# Patient Record
Sex: MALE | Race: WHITE | NOT HISPANIC OR LATINO | Employment: FULL TIME | URBAN - METROPOLITAN AREA
[De-identification: names, ages, dates, MRNs, and addresses within clinical notes are randomized per-mention and may not be internally consistent; named-entity substitution may affect disease eponyms.]

---

## 2018-01-26 ENCOUNTER — TRANSFERRED RECORDS (OUTPATIENT)
Dept: MULTI SPECIALTY CLINIC | Facility: CLINIC | Age: 53
End: 2018-01-26

## 2018-05-04 LAB
CHOLEST SERPL-MCNC: 84 MG/DL
HDLC SERPL-MCNC: 26 MG/DL
LDLC SERPL CALC-MCNC: 24 MG/DL
TRIGL SERPL-MCNC: 172 MG/DL
TSH SERPL-ACNC: 0.67 UIU/ML (ref 0.36–3.74)

## 2019-08-17 LAB
CHOLEST SERPL-MCNC: 110 MG/DL
HDLC SERPL-MCNC: 32 MG/DL
LDLC SERPL CALC-MCNC: 54 MG/DL
TRIGL SERPL-MCNC: 121 MG/DL

## 2020-04-27 ENCOUNTER — TRANSFERRED RECORDS (OUTPATIENT)
Dept: HEALTH INFORMATION MANAGEMENT | Facility: CLINIC | Age: 55
End: 2020-04-27

## 2020-08-11 ENCOUNTER — VIRTUAL VISIT (OUTPATIENT)
Dept: ENDOCRINOLOGY | Facility: CLINIC | Age: 55
End: 2020-08-11
Payer: COMMERCIAL

## 2020-08-11 ENCOUNTER — APPOINTMENT (OUTPATIENT)
Dept: LAB | Facility: CLINIC | Age: 55
End: 2020-08-11
Payer: COMMERCIAL

## 2020-08-11 DIAGNOSIS — E78.5 DYSLIPIDEMIA: ICD-10-CM

## 2020-08-11 DIAGNOSIS — Z79.4 TYPE 2 DIABETES MELLITUS WITH HYPERGLYCEMIA, WITH LONG-TERM CURRENT USE OF INSULIN (H): Primary | ICD-10-CM

## 2020-08-11 DIAGNOSIS — E11.65 TYPE 2 DIABETES MELLITUS WITH HYPERGLYCEMIA, WITH LONG-TERM CURRENT USE OF INSULIN (H): Primary | ICD-10-CM

## 2020-08-11 DIAGNOSIS — I10 ESSENTIAL HYPERTENSION: ICD-10-CM

## 2020-08-11 LAB — HBA1C MFR BLD: 9.3 % (ref 0–5.6)

## 2020-08-11 PROCEDURE — 82947 ASSAY GLUCOSE BLOOD QUANT: CPT | Performed by: INTERNAL MEDICINE

## 2020-08-11 PROCEDURE — 99203 OFFICE O/P NEW LOW 30 MIN: CPT | Mod: 95 | Performed by: INTERNAL MEDICINE

## 2020-08-11 PROCEDURE — 99000 SPECIMEN HANDLING OFFICE-LAB: CPT | Performed by: INTERNAL MEDICINE

## 2020-08-11 PROCEDURE — 36415 COLL VENOUS BLD VENIPUNCTURE: CPT | Performed by: INTERNAL MEDICINE

## 2020-08-11 PROCEDURE — 82607 VITAMIN B-12: CPT | Performed by: INTERNAL MEDICINE

## 2020-08-11 PROCEDURE — 84681 ASSAY OF C-PEPTIDE: CPT | Performed by: INTERNAL MEDICINE

## 2020-08-11 PROCEDURE — 83036 HEMOGLOBIN GLYCOSYLATED A1C: CPT | Performed by: INTERNAL MEDICINE

## 2020-08-11 PROCEDURE — 86341 ISLET CELL ANTIBODY: CPT | Mod: 90 | Performed by: INTERNAL MEDICINE

## 2020-08-11 RX ORDER — METFORMIN HYDROCHLORIDE 500 MG/5ML
SOLUTION ORAL
COMMUNITY
End: 2021-09-30

## 2020-08-11 RX ORDER — INSULIN GLARGINE 100 [IU]/ML
INJECTION, SOLUTION SUBCUTANEOUS DAILY
COMMUNITY
End: 2021-07-13

## 2020-08-11 NOTE — NURSING NOTE
BG Log (Am 7/28/2020-8/11/2020) Patient has [employer] acct., but was unable to get connected. He will call customer service to get acct reconnected.     148 7/28/2020  172  168  244  191   268 out of town and forgot medication  255 out of town and forgot medication  217 out of town and forgot medication  201 out of town and forgot medication  181  171  147  203  212  266 8/11/2020

## 2020-08-11 NOTE — NURSING NOTE
Chief Complaint   Patient presents with     New Patient     DM       Initial There were no vitals taken for this visit. There is no height or weight on file to calculate BMI.  BP completed using cuff size: NA (Not Taken)  Medications and allergies reviewed.      Gissell ROA MA

## 2020-08-11 NOTE — PROGRESS NOTES
"Miquel Llanes is a 55 year old male who is being evaluated via a billable video visit.      The patient has been notified of following:     \"This video visit will be conducted via a call between you and your physician/provider. We have found that certain health care needs can be provided without the need for an in-person physical exam.  This service lets us provide the care you need with a video conversation.  If a prescription is necessary we can send it directly to your pharmacy.  If lab work is needed we can place an order for that and you can then stop by our lab to have the test done at a later time.    Video visits are billed at different rates depending on your insurance coverage.  Please reach out to your insurance provider with any questions.    If during the course of the call the physician/provider feels a video visit is not appropriate, you will not be charged for this service.\"    Patient has given verbal consent for Video visit? Yes  How would you like to obtain your AVS? Calixarhart  If you are dropped from the video visit, the video invite should be resent to: Other e-mail: [employer]  Will anyone else be joining your video visit? No        Video-Visit Details    Type of service:  Video Visit    Video Start Time: 1:31 PM  Video End Time: 1400    Originating Location (pt. Location): Home    Distant Location (provider location):  [facility]     Platform used for Video Visit: Gavin    CC: DM     HPI: Patient presents for management of DM.   Estimates he was diagnosed 10 years ago on routine labs.     He has been seeing Dr Ibarra.     Logs: (AM readings)  148 7/28/2020  172  168  244  191   268 out of town and forgot medication  255 out of town and forgot medication  217 out of town and forgot medication  201 out of town and forgot medication  181  171  147  203  212  266 8/11/2020    He is taking metformin 1000 mg every day, does not tolerate a higher dose.   Jardiance 25 mg every day. "   Basaglar 45 U every day     Comments when he took 50 Units, his feet would burn. No hypoglycemia.   Reduced pop intake and portion sizes.   No regular exercise.   He is wondering if he is still making insulin.     ROS: 10 point ROS neg other than the symptoms noted above in the HPI.    PMH:   Type 2 DM  DJD  Dyslipidemia  Obesity     Meds:  Current Outpatient Medications   Medication     empagliflozin (JARDIANCE) 25 MG TABS tablet     insulin glargine (BASAGLAR KWIKPEN) 100 UNIT/ML pen     metFORMIN (GLUCOPHAGE) 500 MG/5ML SOLN solution     No current facility-administered medications for this visit.      FHX:   Knows DM is in his family but not sure who has it. Does note have a good relationship with his family.     SHX:  Non-smoker.     Exam:   GENERAL: Healthy, alert and no distress  EYES: Eyes grossly normal to inspection.  No discharge or erythema, or obvious scleral/conjunctival abnormalities.  NECK: No asymmetry, visible masses or scars  RESP: No audible wheeze, cough, or visible cyanosis.  No visible retractions or increased work of breathing.    MS: No gross musculoskeletal defects noted.  Normal range of motion.  No visible edema.  SKIN: Visible skin clear. No significant rash, abnormal pigmentation or lesions.  NEURO: Cranial nerves grossly intact.  Mentation and speech appropriate for age.  PSYCH: Mentation appears normal, affect normal/bright, judgement and insight intact, normal speech and appearance well-groomed.     A/P:   Type 2 DM - Outside records reviewed. Dose limited by neuropathy which is aggravated by large changes in glucose per history. He is wondering if he is still making insulin. Discussed reports about GLP-1 and DDP-4 inhibitor with pancreatitis and pancreatic cancer. Although I cannot definitively say the patient will not get pancreatitis or pancreatic cancer, to date there is noconclusive evidence of a causal link with these agents for pancreatic cancer. There is a slight increase  in risk of pancreatitis but overall risk still low. Persons with DM tend to have more pancreatitis and pancreatic cancer de derek. He liked bydureon in the past but it became expensive.   -Check c-peptide, KALE, and glucose.    -Add GLP-1 agonist if still making insulin.    -Add prandial insulin and drop metformin and jardiance if not making insulin.   -Recommend checking before bedtime and AM.   -Check B12 level.   -ASA use optional.  -NAFL/NAIR: labs due.   -Microalbumin normal in 3/2018. ACEi not indicated.    Repeat lab.  (Will contact Dr Ibarra's office for labs as patient says he was seen recently)  -Eyes: Urged to schedule an exam.   -Smoking: none.      Elevated blood pressure - elevated when last checked.   -Rx for blood pressure cuff.     Dyslipidemia - Trg 264, HDL 26, LDL 29 in 9/2017. He has not been on a statin.   -Repeat lab. (Will contact Dr Ibarra's office for labs as patient says he was seen recently)      Wiliam Butts MD on 8/11/2020 at 2:29 PM

## 2020-08-11 NOTE — Clinical Note
Please request records for this patient from Dr Ibarra.   Fax : [phone]    Wiliam Butts MD on 8/11/2020 at 1:55 PM

## 2020-08-11 NOTE — PROGRESS NOTES
[employer] form faxed to Dr. Ibarra [phone] requesting copy of patient's records.    Gissell ROA MA

## 2020-08-12 LAB
C PEPTIDE SERPL-MCNC: 4.3 NG/ML (ref 0.9–6.9)
GLUCOSE SERPL-MCNC: 199 MG/DL (ref 70–99)
VIT B12 SERPL-MCNC: 630 PG/ML (ref 193–986)

## 2020-08-14 LAB — GAD65 AB SER IA-ACNC: <5 IU/ML (ref 0–5)

## 2020-08-17 DIAGNOSIS — Z79.4 TYPE 2 DIABETES MELLITUS WITH HYPERGLYCEMIA, WITH LONG-TERM CURRENT USE OF INSULIN (H): Primary | ICD-10-CM

## 2020-08-17 DIAGNOSIS — E11.65 TYPE 2 DIABETES MELLITUS WITH HYPERGLYCEMIA, WITH LONG-TERM CURRENT USE OF INSULIN (H): Primary | ICD-10-CM

## 2020-11-19 ENCOUNTER — TRANSFERRED RECORDS (OUTPATIENT)
Dept: HEALTH INFORMATION MANAGEMENT | Facility: CLINIC | Age: 55
End: 2020-11-19

## 2020-11-19 LAB — RETINOPATHY: NEGATIVE

## 2021-01-10 ENCOUNTER — HEALTH MAINTENANCE LETTER (OUTPATIENT)
Age: 56
End: 2021-01-10

## 2021-03-22 ENCOUNTER — IMMUNIZATION (OUTPATIENT)
Dept: NURSING | Facility: CLINIC | Age: 56
End: 2021-03-22
Payer: COMMERCIAL

## 2021-03-22 PROCEDURE — 0001A PR COVID VAC PFIZER DIL RECON 30 MCG/0.3 ML IM: CPT

## 2021-03-22 PROCEDURE — 91300 PR COVID VAC PFIZER DIL RECON 30 MCG/0.3 ML IM: CPT

## 2021-03-29 DIAGNOSIS — R82.90 NONSPECIFIC FINDING ON EXAMINATION OF URINE: Primary | ICD-10-CM

## 2021-03-29 DIAGNOSIS — Z79.4 TYPE 2 DIABETES MELLITUS WITH HYPERGLYCEMIA, WITH LONG-TERM CURRENT USE OF INSULIN (H): ICD-10-CM

## 2021-03-29 DIAGNOSIS — I10 ESSENTIAL HYPERTENSION: ICD-10-CM

## 2021-03-29 DIAGNOSIS — E11.65 TYPE 2 DIABETES MELLITUS WITH HYPERGLYCEMIA, WITH LONG-TERM CURRENT USE OF INSULIN (H): ICD-10-CM

## 2021-03-29 LAB — HBA1C MFR BLD: 8.9 % (ref 0–5.6)

## 2021-03-29 PROCEDURE — 80048 BASIC METABOLIC PNL TOTAL CA: CPT | Performed by: INTERNAL MEDICINE

## 2021-03-29 PROCEDURE — 80061 LIPID PANEL: CPT | Performed by: INTERNAL MEDICINE

## 2021-03-29 PROCEDURE — 83036 HEMOGLOBIN GLYCOSYLATED A1C: CPT | Performed by: INTERNAL MEDICINE

## 2021-03-29 PROCEDURE — 83721 ASSAY OF BLOOD LIPOPROTEIN: CPT | Mod: 59 | Performed by: INTERNAL MEDICINE

## 2021-03-29 PROCEDURE — 36415 COLL VENOUS BLD VENIPUNCTURE: CPT | Performed by: INTERNAL MEDICINE

## 2021-03-29 PROCEDURE — 84460 ALANINE AMINO (ALT) (SGPT): CPT | Performed by: INTERNAL MEDICINE

## 2021-03-29 PROCEDURE — 84450 TRANSFERASE (AST) (SGOT): CPT | Performed by: INTERNAL MEDICINE

## 2021-03-29 PROCEDURE — 82043 UR ALBUMIN QUANTITATIVE: CPT | Performed by: INTERNAL MEDICINE

## 2021-03-30 LAB
ALT SERPL W P-5'-P-CCNC: 43 U/L (ref 0–70)
ANION GAP SERPL CALCULATED.3IONS-SCNC: 5 MMOL/L (ref 3–14)
AST SERPL W P-5'-P-CCNC: 25 U/L (ref 0–45)
BUN SERPL-MCNC: 17 MG/DL (ref 7–30)
CALCIUM SERPL-MCNC: 8.7 MG/DL (ref 8.5–10.1)
CHLORIDE SERPL-SCNC: 102 MMOL/L (ref 94–109)
CHOLEST SERPL-MCNC: 106 MG/DL
CO2 SERPL-SCNC: 26 MMOL/L (ref 20–32)
CREAT SERPL-MCNC: 0.71 MG/DL (ref 0.66–1.25)
CREAT UR-MCNC: 35 MG/DL
GFR SERPL CREATININE-BSD FRML MDRD: >90 ML/MIN/{1.73_M2}
GLUCOSE SERPL-MCNC: 370 MG/DL (ref 70–99)
HDLC SERPL-MCNC: 24 MG/DL
LDLC SERPL CALC-MCNC: ABNORMAL MG/DL
LDLC SERPL DIRECT ASSAY-MCNC: 56 MG/DL
MICROALBUMIN UR-MCNC: 24 MG/L
MICROALBUMIN/CREAT UR: 67.51 MG/G CR (ref 0–17)
NONHDLC SERPL-MCNC: 82 MG/DL
POTASSIUM SERPL-SCNC: 4.1 MMOL/L (ref 3.4–5.3)
SODIUM SERPL-SCNC: 133 MMOL/L (ref 133–144)
TRIGL SERPL-MCNC: 588 MG/DL

## 2021-04-12 ENCOUNTER — IMMUNIZATION (OUTPATIENT)
Dept: NURSING | Facility: CLINIC | Age: 56
End: 2021-04-12
Attending: FAMILY MEDICINE
Payer: COMMERCIAL

## 2021-04-12 PROCEDURE — 0002A PR COVID VAC PFIZER DIL RECON 30 MCG/0.3 ML IM: CPT

## 2021-04-12 PROCEDURE — 91300 PR COVID VAC PFIZER DIL RECON 30 MCG/0.3 ML IM: CPT

## 2021-04-14 ENCOUNTER — OFFICE VISIT (OUTPATIENT)
Dept: FAMILY MEDICINE | Facility: CLINIC | Age: 56
End: 2021-04-14
Payer: COMMERCIAL

## 2021-04-14 VITALS
WEIGHT: 239.2 LBS | HEIGHT: 69 IN | BODY MASS INDEX: 35.43 KG/M2 | RESPIRATION RATE: 20 BRPM | HEART RATE: 96 BPM | OXYGEN SATURATION: 99 % | SYSTOLIC BLOOD PRESSURE: 152 MMHG | TEMPERATURE: 98.1 F | DIASTOLIC BLOOD PRESSURE: 90 MMHG

## 2021-04-14 DIAGNOSIS — E11.29 TYPE 2 DIABETES MELLITUS WITH MICROALBUMINURIA, WITH LONG-TERM CURRENT USE OF INSULIN (H): ICD-10-CM

## 2021-04-14 DIAGNOSIS — Z12.11 SCREEN FOR COLON CANCER: ICD-10-CM

## 2021-04-14 DIAGNOSIS — G62.9 POLYNEUROPATHY: ICD-10-CM

## 2021-04-14 DIAGNOSIS — Z11.4 SCREENING FOR HIV (HUMAN IMMUNODEFICIENCY VIRUS): ICD-10-CM

## 2021-04-14 DIAGNOSIS — Z12.5 SCREENING PSA (PROSTATE SPECIFIC ANTIGEN): ICD-10-CM

## 2021-04-14 DIAGNOSIS — Z00.01 ENCOUNTER FOR ROUTINE ADULT MEDICAL EXAM WITH ABNORMAL FINDINGS: Primary | ICD-10-CM

## 2021-04-14 DIAGNOSIS — Z11.59 NEED FOR HEPATITIS C SCREENING TEST: ICD-10-CM

## 2021-04-14 DIAGNOSIS — Z13.220 LIPID SCREENING: ICD-10-CM

## 2021-04-14 DIAGNOSIS — R39.9 LOWER URINARY TRACT SYMPTOMS: ICD-10-CM

## 2021-04-14 DIAGNOSIS — Z79.4 TYPE 2 DIABETES MELLITUS WITH MICROALBUMINURIA, WITH LONG-TERM CURRENT USE OF INSULIN (H): ICD-10-CM

## 2021-04-14 DIAGNOSIS — R20.8 DYSESTHESIA: ICD-10-CM

## 2021-04-14 DIAGNOSIS — E78.1 HYPERTRIGLYCERIDEMIA: ICD-10-CM

## 2021-04-14 DIAGNOSIS — R80.9 TYPE 2 DIABETES MELLITUS WITH MICROALBUMINURIA, WITH LONG-TERM CURRENT USE OF INSULIN (H): ICD-10-CM

## 2021-04-14 LAB
B BURGDOR IGG+IGM SER QL: 0.08 (ref 0–0.89)
PSA SERPL-ACNC: 0.44 UG/L (ref 0–4)

## 2021-04-14 PROCEDURE — 36415 COLL VENOUS BLD VENIPUNCTURE: CPT | Performed by: PHYSICIAN ASSISTANT

## 2021-04-14 PROCEDURE — 99386 PREV VISIT NEW AGE 40-64: CPT | Performed by: PHYSICIAN ASSISTANT

## 2021-04-14 PROCEDURE — 86618 LYME DISEASE ANTIBODY: CPT | Performed by: PHYSICIAN ASSISTANT

## 2021-04-14 PROCEDURE — 86803 HEPATITIS C AB TEST: CPT | Performed by: PHYSICIAN ASSISTANT

## 2021-04-14 PROCEDURE — 99214 OFFICE O/P EST MOD 30 MIN: CPT | Mod: 25 | Performed by: PHYSICIAN ASSISTANT

## 2021-04-14 PROCEDURE — G0103 PSA SCREENING: HCPCS | Performed by: PHYSICIAN ASSISTANT

## 2021-04-14 PROCEDURE — 87389 HIV-1 AG W/HIV-1&-2 AB AG IA: CPT | Performed by: PHYSICIAN ASSISTANT

## 2021-04-14 ASSESSMENT — MIFFLIN-ST. JEOR: SCORE: 1915.63

## 2021-04-14 NOTE — PROGRESS NOTES
3  SUBJECTIVE:   CC: Miquel Llanes is an 56 year old male who presents for preventive health visit.     Patient has been advised of split billing requirements and indicates understanding: Yes  Healthy Habits:      Do you get at least three servings of calcium containing foods daily (dairy, green leafy vegetables, etc.)? yes and no, taking calcium and/or vitamin D supplement: no  Amount of exercise or daily activities, outside of work: 7 day(s) per week  Problems taking medications regularly No  Medication side effects: No  Have you had an eye exam in the past two years? Yes-[facility]  Do you see a dentist twice per year? yes  Do you have sleep apnea, excessive snoring or daytime drowsiness?yes-sometimes    The ASCVD Risk score (Babak ZIMMER Jr., et al., 2013) failed to calculate for the following reasons:    The valid total cholesterol range is 130 to 320 mg/dL     PROBLEMS TO ADD ON...  Establish Care  DM type 2    Fasting     When walking it feels like I am walking in mud  Intermittent  Feels like every step was heavy  No balance changes, double vision, one sided weakness  Has happened on longer walks, >15-20 mins of walking  Started 1 year ago, not more frequent     Burning sensations, altered sensation  Started 6-7 months, worsening now    Joined naturally slim - went sugar free for 3 weeks and his A1c and sugars changed for the worse     Frequent urination   Feels like it takes a long time to urinate - in the bathroom forever    -------------------------------------    Today's PHQ-2 Score:   PHQ-2 ( 1999 [employer]izer) 4/14/2021   Q1: Little interest or pleasure in doing things 0   Q2: Feeling down, depressed or hopeless 0   PHQ-2 Score 0       Abuse: Current or Past(Physical, Sexual or Emotional)- No  Do you feel safe in your environment? Yes    Have you ever done Advance Care Planning? (For example, a Health Directive, POLST, or a discussion with a medical provider or your loved ones about your  "wishes): No, advance care planning information given to patient to review.  Patient plans to discuss their wishes with loved ones or provider.      Social History     Tobacco Use     Smoking status: Never Smoker     Smokeless tobacco: Never Used   Substance Use Topics     Alcohol use: Not Currently     If you drink alcohol do you typically have >3 drinks per day or >7 drinks per week? No                      Last PSA: No results found for: PSA    Reviewed orders with patient. Reviewed health maintenance and updated orders accordingly - Yes  Lab work is in process    Reviewed and updated as needed this visit by clinical staff  Tobacco  Allergies               Reviewed and updated as needed this visit by Provider                Past Medical History:   Diagnosis Date     Diabetes (H)         ROS:  Other than what is noted in the HPI and PMH a complete review of systems is otherwise negative including: Constitutional, HEENT, endocrine, cardiovascular, respiratory, GI/, musculoskeletal, neuro, and psychiatric.     OBJECTIVE:   BP (!) 152/90   Pulse 96   Temp 98.1  F (36.7  C) (Tympanic)   Resp 20   Ht 1.761 m (5' 9.33\")   Wt 108.5 kg (239 lb 3.2 oz)   SpO2 99%   BMI 34.99 kg/m    EXAM:  GENERAL: healthy, alert and no distress  EYES: Eyes grossly normal to inspection, PERRL and conjunctivae and sclerae normal  HENT: ear canals and TM's normal, nose and mouth without ulcers or lesions  NECK: no adenopathy, no asymmetry, masses, or scars and thyroid normal to palpation  RESP: lungs clear to auscultation - no rales, rhonchi or wheezes  CV: regular rates and rhythm, normal S1 S2, no S3 or S4 and no murmur, click or rub  ABDOMEN: soft, nontender, no hepatosplenomegaly, no masses and bowel sounds normal  MS: no gross musculoskeletal defects noted, no edema  SKIN: no suspicious lesions or rashes  NEURO: Normal strength and tone, mentation intact and speech normal  PSYCH: mentation appears normal, affect " "normal/bright    ASSESSMENT/PLAN:       ICD-10-CM    1. Encounter for routine adult medical exam with abnormal findings  Z00.01    2. Screen for colon cancer  Z12.11    3. Screening for HIV (human immunodeficiency virus)  Z11.4 HIV Antigen Antibody Combo   4. Need for hepatitis C screening test  Z11.59 Hepatitis C Screen Reflex to HCV RNA Quant and Genotype   5. Lipid screening  Z13.220 Lipid panel reflex to direct LDL Fasting   6. Screening PSA (prostate specific antigen)  Z12.5 Prostate spec antigen screen   7. Type 2 diabetes mellitus with microalbuminuria, with long-term current use of insulin (H)  E11.29 **A1C FUTURE anytime    R80.9 CANCELED: Albumin Random Urine Quantitative with Creat Ratio    Z79.4    8. Hypertriglyceridemia  E78.1    9. Dysesthesia  R20.8 Lyme Disease Farhana with reflex to WB Serum   10. Polyneuropathy  G62.9    11. Lower urinary tract symptoms  R39.9 UROLOGY ADULT REFERRAL       1-6) Screenings discussed    7,8) Follows up with endocrine on 4/20/21. Will recheck A1c and lipids in 3 months.     9,10) Presumptive diabetic polyneuropathy. Will check a Lyme titer today. This could improve as his diabetes becomes controlled again. If not, could obtain an EMG or refer him to neurology.     11) Referral to urology for further eval/treatment.      Patient has been advised of split billing requirements and indicates understanding: Yes  COUNSELING:  Reviewed preventive health counseling, as reflected in patient instructions    Estimated body mass index is 34.99 kg/m  as calculated from the following:    Height as of this encounter: 1.761 m (5' 9.33\").    Weight as of this encounter: 108.5 kg (239 lb 3.2 oz).    He reports that he has never smoked. He has never used smokeless tobacco.      Counseling Resources:  ATP IV Guidelines  Pooled Cohorts Equation Calculator  FRAX Risk Assessment  ICSI Preventive Guidelines  Dietary Guidelines for Americans, 2010  USDA's MyPlate  ASA Prophylaxis  Lung CA " Screening    Ana Maria Valentin PA-C  [facility]

## 2021-04-15 LAB
HCV AB SERPL QL IA: NONREACTIVE
HIV 1+2 AB+HIV1 P24 AG SERPL QL IA: NONREACTIVE

## 2021-04-20 ENCOUNTER — OFFICE VISIT (OUTPATIENT)
Dept: ENDOCRINOLOGY | Facility: CLINIC | Age: 56
End: 2021-04-20
Payer: COMMERCIAL

## 2021-04-20 VITALS
BODY MASS INDEX: 36.26 KG/M2 | DIASTOLIC BLOOD PRESSURE: 84 MMHG | RESPIRATION RATE: 18 BRPM | WEIGHT: 244.8 LBS | SYSTOLIC BLOOD PRESSURE: 158 MMHG | HEIGHT: 69 IN | HEART RATE: 96 BPM

## 2021-04-20 DIAGNOSIS — Z79.4 TYPE 2 DIABETES MELLITUS WITH HYPERGLYCEMIA, WITH LONG-TERM CURRENT USE OF INSULIN (H): Primary | ICD-10-CM

## 2021-04-20 DIAGNOSIS — R80.9 MICROALBUMINURIA: ICD-10-CM

## 2021-04-20 DIAGNOSIS — E11.65 TYPE 2 DIABETES MELLITUS WITH HYPERGLYCEMIA, WITH LONG-TERM CURRENT USE OF INSULIN (H): Primary | ICD-10-CM

## 2021-04-20 DIAGNOSIS — E78.5 DYSLIPIDEMIA: ICD-10-CM

## 2021-04-20 DIAGNOSIS — I10 ESSENTIAL HYPERTENSION: ICD-10-CM

## 2021-04-20 PROCEDURE — 99214 OFFICE O/P EST MOD 30 MIN: CPT | Performed by: INTERNAL MEDICINE

## 2021-04-20 RX ORDER — ATORVASTATIN CALCIUM 40 MG/1
40 TABLET, FILM COATED ORAL DAILY
Qty: 90 TABLET | Refills: 3 | Status: SHIPPED | OUTPATIENT
Start: 2021-04-20 | End: 2022-04-06

## 2021-04-20 RX ORDER — LISINOPRIL 10 MG/1
10 TABLET ORAL DAILY
Qty: 90 TABLET | Refills: 3 | Status: SHIPPED | OUTPATIENT
Start: 2021-04-20 | End: 2022-04-06

## 2021-04-20 RX ORDER — PIOGLITAZONEHYDROCHLORIDE 15 MG/1
15 TABLET ORAL DAILY
Qty: 90 TABLET | Refills: 3 | Status: SHIPPED | OUTPATIENT
Start: 2021-04-20 | End: 2022-04-06

## 2021-04-20 ASSESSMENT — MIFFLIN-ST. JEOR: SCORE: 1941.02

## 2021-04-20 NOTE — PATIENT INSTRUCTIONS
-Start lisinopril 10 mg every day . For blood pressure.   -Lab and blood pressure check in 2 weeks.      -Start atorvastatin 40 mg every day.  For cholesterol.   -Lipids in 3 months.     -Diabetes education referral. Discuss your problems with bydureon injections with them.   -Start checking glucose before each meal and at bedtime.   -Start actos 15 mg every day.

## 2021-04-20 NOTE — PROGRESS NOTES
"S:   Patient presents for management of DM.   Estimates he was diagnosed 10 years ago on routine labs.     He has been seeing Dr Ibarra.     He is taking metformin 1000 mg every day, does not tolerate a higher dose.   Jardiance 25 mg every day.   Basaglar 48 U every day   Bydureon 2 mg once a week.     Checking daily in the AM.   200's. Today was 300 after Chinese last night.     Notes leaking of his bydureon prior to injection and after he injects coming out of his skin.     ROS: 10 point ROS neg other than the symptoms noted above in the HPI.    Exam:   Vital signs:      BP: (!) 158/84 Pulse: 96   Resp: 18       Height: 176.1 cm (5' 9.33\") Weight: 111 kg (244 lb 12.8 oz)  Estimated body mass index is 35.81 kg/m  as calculated from the following:    Height as of this encounter: 1.761 m (5' 9.33\").    Weight as of this encounter: 111 kg (244 lb 12.8 oz).  Gen: In NAD.   HEENT: no proptosis or lid lag, EOMI, MMM.   Card: S1 S2 RRR no m/r/g.  Pulm: CTA b/l.   Ext: no LE edema.   Neuro: no tremor, +2 DTR's.  Normal monofilament.     A/P:   Type 2 DM - Outside records reviewed. Dose limited by neuropathy which is aggravated by large changes in glucose per history. He is wondering if he is still making insulin. Discussed reports about GLP-1 and DDP-4 inhibitor with pancreatitis and pancreatic cancer. Although I cannot definitively say the patient will not get pancreatitis or pancreatic cancer, to date there is noconclusive evidence of a causal link with these agents for pancreatic cancer. There is a slight increase in risk of pancreatitis but overall risk still low. Persons with DM tend to have more pancreatitis and pancreatic cancer de derek. He liked bydureon in the past but it became expensive.   Labs from 8/2020 consistent with type 2 DM. Rx for bydureon.   In 4/2021, discussed actos versus meal time insulin.   -Diabetes education referral. Discuss your problems with bydureon injections with them.   -Start checking " glucose before each meal and at bedtime.   -Start actos 15 mg every day.    -ASA use optional.  -NAFL/NAIR: normal ALT and AST in 3/2021.   -Microalbumin normal in 3/2018. ACEi not indicated.    67.51 in 3/2021. Discussed significance.    ACEi as below.   -Eyes: No DR in 11/2020.   -Smoking: none.      Elevated blood pressure - Reports home readings 147/92, 147/95, 148/102, 152/103.  -Start lisinopril 10 mg every day .   -Lab and blood pressure check in 2 weeks.      Dyslipidemia - Trg 264, HDL 26, LDL 29 in 9/2017. He has not been on a statin.   Trg 588, HDL 24, LDL 56  in 3/2021. Discussed risk of pancreatitis if Trg increase further.   -Start atorvastatin 40 mg every day.   -Lipids in 3 months.   -DM management as above.     Wiliam Butts MD on 4/20/2021 at 3:38 PM

## 2021-04-20 NOTE — NURSING NOTE
"Chief Complaint   Patient presents with     Diabetes     Follow Up       Initial BP (!) 158/84 (BP Location: Right arm, Patient Position: Sitting, Cuff Size: Adult Large)   Pulse 96   Resp 18   Ht 1.761 m (5' 9.33\")   Wt 111 kg (244 lb 12.8 oz)   BMI 35.81 kg/m   Estimated body mass index is 35.81 kg/m  as calculated from the following:    Height as of this encounter: 1.761 m (5' 9.33\").    Weight as of this encounter: 111 kg (244 lb 12.8 oz).  BP completed using cuff size: large  Medications and allergies reviewed.      Gissell ROA MA    "

## 2021-04-21 ENCOUNTER — TELEPHONE (OUTPATIENT)
Dept: ENDOCRINOLOGY | Facility: CLINIC | Age: 56
End: 2021-04-21

## 2021-04-21 DIAGNOSIS — E11.65 TYPE 2 DIABETES MELLITUS WITH HYPERGLYCEMIA, WITH LONG-TERM CURRENT USE OF INSULIN (H): ICD-10-CM

## 2021-04-21 DIAGNOSIS — Z79.4 TYPE 2 DIABETES MELLITUS WITH HYPERGLYCEMIA, WITH LONG-TERM CURRENT USE OF INSULIN (H): ICD-10-CM

## 2021-04-21 NOTE — TELEPHONE ENCOUNTER
Prior Authorization Retail Medication Request    Medication/Dose: bydureon  ICD code (if different than what is on RX):    Previously Tried and Failed:    Rationale:      Insurance Name:  [employer]  Insurance ID:  [id number]      Pharmacy Information (if different than what is on RX)  Name:  [facility]  Phone:  [phone]

## 2021-04-22 NOTE — TELEPHONE ENCOUNTER
Prior Authorization Approval    Authorization Effective Date: 4/22/2021  Authorization Expiration Date: 4/22/2022  Medication: bydureon 2 MG/0.85ML auto-injector- APPROVED  Approved Dose/Quantity: 3.4 ML  Reference #: [id number]   Insurance Company: [employer] ([facility]) - Phone [phone] Fax [phone]  Expected CoPay:     $49  CoPay Card Available:      Foundation Assistance Needed:    Which Pharmacy is filling the prescription (Not needed for infusion/clinic administered): [id number] - [place], [place] - [address] AT [facility] & [address]  Pharmacy Notified: YES   Patient Notified:  YES              Central Prior Authorization Team  Phone: [phone]

## 2021-04-22 NOTE — TELEPHONE ENCOUNTER
PA Initiation    Medication: bydureon 2 MG/0.85ML auto-injector- INITIATED  Insurance Company: [employer] ([facility]) - Phone [phone] Fax [phone]  Pharmacy Filling the Rx: [id number] - [place], [place] - [address] AT [facility] & [address]  Filling Pharmacy Phone: [phone]  Filling Pharmacy Fax: [phone]  Start Date: 4/22/2021

## 2021-05-21 ENCOUNTER — OFFICE VISIT (OUTPATIENT)
Dept: UROLOGY | Facility: CLINIC | Age: 56
End: 2021-05-21
Attending: PHYSICIAN ASSISTANT
Payer: COMMERCIAL

## 2021-05-21 VITALS — OXYGEN SATURATION: 98 % | SYSTOLIC BLOOD PRESSURE: 111 MMHG | DIASTOLIC BLOOD PRESSURE: 78 MMHG | HEART RATE: 96 BPM

## 2021-05-21 DIAGNOSIS — R39.9 LOWER URINARY TRACT SYMPTOMS: ICD-10-CM

## 2021-05-21 LAB
ALBUMIN UR-MCNC: NEGATIVE MG/DL
APPEARANCE UR: CLEAR
BILIRUB UR QL STRIP: NEGATIVE
COLOR UR AUTO: YELLOW
GLUCOSE UR STRIP-MCNC: >=1000 MG/DL
HGB UR QL STRIP: NEGATIVE
KETONES UR STRIP-MCNC: NEGATIVE MG/DL
LEUKOCYTE ESTERASE UR QL STRIP: NEGATIVE
NITRATE UR QL: NEGATIVE
PH UR STRIP: 5 PH (ref 5–7)
SOURCE: ABNORMAL
SP GR UR STRIP: <=1.005 (ref 1–1.03)
UROBILINOGEN UR STRIP-ACNC: 0.2 EU/DL (ref 0.2–1)

## 2021-05-21 PROCEDURE — 99203 OFFICE O/P NEW LOW 30 MIN: CPT | Mod: 25 | Performed by: UROLOGY

## 2021-05-21 PROCEDURE — 51798 US URINE CAPACITY MEASURE: CPT | Performed by: UROLOGY

## 2021-05-21 PROCEDURE — 81003 URINALYSIS AUTO W/O SCOPE: CPT | Performed by: UROLOGY

## 2021-05-21 NOTE — PROGRESS NOTES
S: Miquel Llanes is a pleasant  56 year old male who was requested to be seen by  Chris Colvin (Inactive) for a consult with regard to patient's urinary complaints.  Patient complains of slower urinary stream.  He has no history of elevated PSA.  Symptoms have been on going for   several years(s).  Seems to be worsened over time.  His recent PSA was found to be   PSA   Date Value Ref Range Status   04/14/2021 0.44 0 - 4 ug/L Final     Comment:     Assay Method:  Chemiluminescence using Siemens Vista analyzer   .  His AUA Symptom Score:  10.  His QOL score:  3.  He has DM with recent A1C of 8.9.  He drinks a lot of water.    Current Outpatient Medications   Medication Sig Dispense Refill     atorvastatin (LIPITOR) 40 MG tablet Take 1 tablet (40 mg) by mouth daily 90 tablet 3     empagliflozin (JARDIANCE) 25 MG TABS tablet        exenatide ER (BYDUREON BCISE) 2 MG/0.85ML auto-injector Inject 2 mg Subcutaneous every 7 days 3.4 mL 11     exenatide ER (BYDUREON) 2 MG pen Inject 2 mg Subcutaneous every 7 days 12 each 3     insulin glargine (BASAGLAR KWIKPEN) 100 UNIT/ML pen Inject Subcutaneous daily       lisinopril (ZESTRIL) 10 MG tablet Take 1 tablet (10 mg) by mouth daily 90 tablet 3     metFORMIN (GLUCOPHAGE) 500 MG/5ML SOLN solution        pioglitazone (ACTOS) 15 MG tablet Take 1 tablet (15 mg) by mouth daily 90 tablet 3     No Known Allergies  Past Medical History:   Diagnosis Date     Diabetes (H)      Past Surgical History:   Procedure Laterality Date     ORTHOPEDIC SURGERY        Family History   Problem Relation Age of Onset     Substance Abuse Father      He does not have a family history of prostate cancer.  Social History     Socioeconomic History     Marital status:      Spouse name: None     Number of children: None     Years of education: None     Highest education level: None   Occupational History     None   Social Needs     Financial resource strain: None     Food insecurity     Worry:  None     Inability: None     Transportation needs     Medical: None     Non-medical: None   Tobacco Use     Smoking status: Never Smoker     Smokeless tobacco: Never Used   Substance and Sexual Activity     Alcohol use: Not Currently     Drug use: Never     Sexual activity: None   Lifestyle     Physical activity     Days per week: None     Minutes per session: None     Stress: None   Relationships     Social connections     Talks on phone: None     Gets together: None     Attends Congregational service: None     Active member of club or organization: None     Attends meetings of clubs or organizations: None     Relationship status: None     Intimate partner violence     Fear of current or ex partner: None     Emotionally abused: None     Physically abused: None     Forced sexual activity: None   Other Topics Concern     None   Social History Narrative     None        REVIEW OF SYSTEMS  =================  C: NEGATIVE for fever, chills, change in weight  I: NEGATIVE for worrisome rashes, moles or lesions  E/M: NEGATIVE for ear, mouth and throat problems  R: NEGATIVE for significant cough or SHORTNESS OF BREATH  CV:  NEGATIVE for chest pain, palpitations or peripheral edema  GI: NEGATIVE for nausea, abdominal pain, heartburn, or change in bowel habits  NEURO: NEGATIVE numbness/weakness  : see HPI  PSYCH: NEGATIVE depression/anxiety  LYmph: no new enlarged lymph nodes  Ortho: no new trauma/movements           O: Exam:/78 (BP Location: Right arm, Patient Position: Chair, Cuff Size: Adult Regular)   Pulse 96   SpO2 98%    Constitutional: healthy, alert and no distress  Cardiovascular: negative, PMI normal.   Respiratory: negative, no evidence of respiratory distress  Gastrointestinal: Abdomen soft, non-tender. BS normal. No masses, organomegaly  : penis no discharge. Testis no masses.  No scrotal skin lesion.  Prostate 30 gm smooth  Musculoskeletal: extremities normal- no gross deformities noted, gait normal and  normal muscle tone  Skin: no suspicious lesions or rashes  Neurologic: Alert and oriented  Psychiatric: mentation appears normal. and affect normal/bright  Hematologic/Lymphatic/Immunologic: normal ant/post cervical, axillary, supraclavicular and inguinal nodes    Assessment/Plan:   (R39.9) Lower urinary tract symptoms  Comment: PVR 90 ml  Plan: early prostatism           tx options discussed           Patient favored observation           Discussed DM related urinary frequency           Discussed fluid intake

## 2021-06-11 DIAGNOSIS — E11.65 TYPE 2 DIABETES MELLITUS WITH HYPERGLYCEMIA, WITH LONG-TERM CURRENT USE OF INSULIN (H): Primary | ICD-10-CM

## 2021-06-11 DIAGNOSIS — Z79.4 TYPE 2 DIABETES MELLITUS WITH HYPERGLYCEMIA, WITH LONG-TERM CURRENT USE OF INSULIN (H): Primary | ICD-10-CM

## 2021-06-14 NOTE — TELEPHONE ENCOUNTER
Prescription approved per [facility] Refill Protocol.    Darryl BAUMANN RN....6/14/2021 8:36 AM

## 2021-06-15 ENCOUNTER — TELEPHONE (OUTPATIENT)
Dept: ENDOCRINOLOGY | Facility: CLINIC | Age: 56
End: 2021-06-15

## 2021-06-15 NOTE — TELEPHONE ENCOUNTER
Reason for Call:  Form, our goal is to have forms completed with 72 hours, however, some forms may require a visit or additional information.    Type of letter, form or note:  disability    Who is the form from?: Patient    Where did the form come from: Patient or family brought in       What clinic location was the form placed at?: [facility]    Where the form was placed: Put in the mail box down on the first floor  Box/Folder    What number is listed as a contact on the form?: [phone]       Additional comments: Patient would like for you call him to come  the original form after you fax it DMV. Thank you    Call taken on 6/15/2021 at 5:27 PM by Iona Botello

## 2021-06-17 NOTE — TELEPHONE ENCOUNTER
Placed completed form at  of [address] [place] [address] in [place].    Darryl BAUMANN RN....6/17/2021 2:34 PM

## 2021-06-17 NOTE — TELEPHONE ENCOUNTER
DMV form completed and faxed back to the DMV. Called patient and informed that form has been completed and will fax back to [facility] for him to  copy. Understanding voiced.     Forms emailed to co-worker, she will place forms at the [place]  for patient to .     Gissell ROA MA

## 2021-07-03 ENCOUNTER — HEALTH MAINTENANCE LETTER (OUTPATIENT)
Age: 56
End: 2021-07-03

## 2021-07-06 ENCOUNTER — TELEPHONE (OUTPATIENT)
Dept: FAMILY MEDICINE | Facility: CLINIC | Age: 56
End: 2021-07-06

## 2021-07-06 NOTE — TELEPHONE ENCOUNTER
Rocio Carson CMA contacted Miquel on 07/06/21 and left a message. If patient calls back please schedule appointment for follow up Diabetes and repeat labs.

## 2021-07-13 ENCOUNTER — TELEPHONE (OUTPATIENT)
Dept: ENDOCRINOLOGY | Facility: CLINIC | Age: 56
End: 2021-07-13

## 2021-07-13 DIAGNOSIS — Z79.4 TYPE 2 DIABETES MELLITUS WITH MICROALBUMINURIA, WITH LONG-TERM CURRENT USE OF INSULIN (H): Primary | ICD-10-CM

## 2021-07-13 DIAGNOSIS — R80.9 TYPE 2 DIABETES MELLITUS WITH MICROALBUMINURIA, WITH LONG-TERM CURRENT USE OF INSULIN (H): Primary | ICD-10-CM

## 2021-07-13 DIAGNOSIS — E11.29 TYPE 2 DIABETES MELLITUS WITH MICROALBUMINURIA, WITH LONG-TERM CURRENT USE OF INSULIN (H): Primary | ICD-10-CM

## 2021-07-13 RX ORDER — INSULIN GLARGINE 100 [IU]/ML
48 INJECTION, SOLUTION SUBCUTANEOUS DAILY
Qty: 14.4 ML | Refills: 11 | Status: SHIPPED | OUTPATIENT
Start: 2021-07-13 | End: 2021-08-03

## 2021-07-13 NOTE — TELEPHONE ENCOUNTER
[facility] Call Center    Phone Message    May a detailed message be left on voicemail: yes     Reason for Call: Medication Refill Request    Has the patient contacted the pharmacy for the refill? Yes   Name of medication being requested: insulin glargine (BASAGLAR KWIKPEN) 100 UNIT/ML pen  Provider who prescribed the medication:  Will need Dr Butts to fill it for him. It was cancelled by previous provider  Pharmacy: [id number] - [place], [place] - [address] AT [facility]    Date medication is needed: Today  7/13/2021  ASAP          Action Taken: Message routed to:  [facility] CHELSEA Ellis/Luiz    Travel Screening: Not Applicable                                                                        
Refilled medication per Dr. Butts.    Darryl BAUMANN RN....7/13/2021 12:56 PM     
none

## 2021-07-21 ENCOUNTER — OFFICE VISIT (OUTPATIENT)
Dept: FAMILY MEDICINE | Facility: CLINIC | Age: 56
End: 2021-07-21
Payer: COMMERCIAL

## 2021-07-21 VITALS
RESPIRATION RATE: 16 BRPM | BODY MASS INDEX: 35.37 KG/M2 | HEART RATE: 87 BPM | OXYGEN SATURATION: 97 % | SYSTOLIC BLOOD PRESSURE: 136 MMHG | WEIGHT: 241.8 LBS | DIASTOLIC BLOOD PRESSURE: 86 MMHG | TEMPERATURE: 97.1 F

## 2021-07-21 DIAGNOSIS — Z79.4 TYPE 2 DIABETES MELLITUS WITH HYPERGLYCEMIA, WITH LONG-TERM CURRENT USE OF INSULIN (H): Primary | ICD-10-CM

## 2021-07-21 DIAGNOSIS — E11.65 TYPE 2 DIABETES MELLITUS WITH HYPERGLYCEMIA, WITH LONG-TERM CURRENT USE OF INSULIN (H): Primary | ICD-10-CM

## 2021-07-21 DIAGNOSIS — E66.01 MORBID OBESITY (H): ICD-10-CM

## 2021-07-21 LAB
ALT SERPL W P-5'-P-CCNC: 39 U/L (ref 0–70)
AST SERPL W P-5'-P-CCNC: 18 U/L (ref 0–45)
CHOLEST SERPL-MCNC: 89 MG/DL
CREAT UR-MCNC: 53 MG/DL
FASTING STATUS PATIENT QL REPORTED: YES
HBA1C MFR BLD: 9.3 % (ref 0–5.6)
HDLC SERPL-MCNC: 34 MG/DL
HOLD SPECIMEN: NORMAL
LDLC SERPL CALC-MCNC: 31 MG/DL
MICROALBUMIN UR-MCNC: 36 MG/DL
MICROALBUMIN/CREAT UR: 67.92 MG/G CR (ref 0–17)
NONHDLC SERPL-MCNC: 55 MG/DL
TRIGL SERPL-MCNC: 120 MG/DL

## 2021-07-21 PROCEDURE — 83036 HEMOGLOBIN GLYCOSYLATED A1C: CPT | Performed by: PHYSICIAN ASSISTANT

## 2021-07-21 PROCEDURE — 90715 TDAP VACCINE 7 YRS/> IM: CPT | Performed by: PHYSICIAN ASSISTANT

## 2021-07-21 PROCEDURE — 36415 COLL VENOUS BLD VENIPUNCTURE: CPT | Performed by: PHYSICIAN ASSISTANT

## 2021-07-21 PROCEDURE — 80061 LIPID PANEL: CPT | Performed by: PHYSICIAN ASSISTANT

## 2021-07-21 PROCEDURE — 99213 OFFICE O/P EST LOW 20 MIN: CPT | Mod: 25 | Performed by: PHYSICIAN ASSISTANT

## 2021-07-21 PROCEDURE — 84460 ALANINE AMINO (ALT) (SGPT): CPT | Performed by: PHYSICIAN ASSISTANT

## 2021-07-21 PROCEDURE — 90471 IMMUNIZATION ADMIN: CPT | Performed by: PHYSICIAN ASSISTANT

## 2021-07-21 PROCEDURE — 84450 TRANSFERASE (AST) (SGOT): CPT | Performed by: PHYSICIAN ASSISTANT

## 2021-07-21 PROCEDURE — 82043 UR ALBUMIN QUANTITATIVE: CPT | Performed by: PHYSICIAN ASSISTANT

## 2021-07-21 PROCEDURE — 99207 ZZC FOOT EXAM  NO CHARGE: CPT | Performed by: PHYSICIAN ASSISTANT

## 2021-07-21 NOTE — PROGRESS NOTES
"    Assessment & Plan     1. Type 2 diabetes mellitus with hyperglycemia, with long-term current use of insulin (H)    2. Morbid obesity (H)        1) Blood pressure at goal. Labs obtained and in process. Follow up pending A1c result.     2) Co morbidity    Patient Instructions   The most common cause of muscle cramping is dehydration.  Try drinking 20 ounces of water after supper.   A 10-min walk in the evening has been shown to decrease nighttime leg cramps.      Ordering of each unique test  Prescription drug management         BMI:   Estimated body mass index is 35.37 kg/m  as calculated from the following:    Height as of 4/20/21: 1.761 m (5' 9.33\").    Weight as of this encounter: 109.7 kg (241 lb 12.8 oz).       Return for follow up pending lab and/or imaging results.    Ana Maria Valentin PA-C  [facility] SANIYA Hoskins is a 56 year old who presents for the following health issues     HPI     Diabetes Follow-up    How often are you checking your blood sugar? One time daily  What time of day are you checking your blood sugars (select all that apply)?  Before meals  Have you had any blood sugars above 200?  Yes 150-290  Have you had any blood sugars below 70?  No    What symptoms do you notice when your blood sugar is low?  None    What concerns do you have today about your diabetes? Blood sugar is often over 200, what kind of diabetes do I have, explain more, what is causing diabetes     Do you have any of these symptoms? (Select all that apply)  Numbness in feet, Burning in feet and Blurry vision     Started on Actos by thierry in April  Sugars are more stable   140-290 range              Hyperlipidemia Follow-Up      Are you regularly taking any medication or supplement to lower your cholesterol?   Yes- atorvastatin    Are you having muscle aches or other side effects that you think could be caused by your cholesterol lowering medication?  Yes- leg cramps     Started on Lipitor " by thierry in April      Hypertension Follow-up      Do you check your blood pressure regularly outside of the clinic? Yes     Are you following a low salt diet? Yes    Are your blood pressures ever more than 140 on the top number (systolic) OR more   than 90 on the bottom number (diastolic), for example 140/90? Yes     Started on lisinopril by thierry in April      BP Readings from Last 2 Encounters:   07/21/21 136/86   05/21/21 111/78     Hemoglobin A1C (%)   Date Value   07/21/2021 9.3 (H)   03/29/2021 8.9 (H)   08/11/2020 9.3 (H)     LDL Cholesterol Calculated (mg/dL)   Date Value   03/29/2021     Cannot estimate LDL when triglyceride exceeds 400 mg/dL   08/17/2019 54     LDL Cholesterol Direct (mg/dL)   Date Value   03/29/2021 56       Review of Systems   Constitutional, cardiovascular, neuro, skin, endocrine systems are negative, except as otherwise noted.      Objective    /86   Pulse 87   Temp 97.1  F (36.2  C) (Tympanic)   Resp 16   Wt 109.7 kg (241 lb 12.8 oz)   SpO2 97%   BMI 35.37 kg/m    Body mass index is 35.37 kg/m .  Physical Exam   GENERAL: healthy, alert and no distress  MS: no gross musculoskeletal defects noted, no edema  SKIN: no suspicious lesions or rashes  NEURO: Normal strength and tone, mentation intact and speech normal  PSYCH: mentation appears normal, affect normal/bright  Diabetic foot exam: normal DP and PT pulses, no trophic changes or ulcerative lesions, normal sensory exam and normal monofilament exam

## 2021-07-21 NOTE — PATIENT INSTRUCTIONS
The most common cause of muscle cramping is dehydration.  Try drinking 20 ounces of water after supper.   A 10-min walk in the evening has been shown to decrease nighttime leg cramps.

## 2021-08-02 DIAGNOSIS — E11.29 TYPE 2 DIABETES MELLITUS WITH MICROALBUMINURIA, WITH LONG-TERM CURRENT USE OF INSULIN (H): ICD-10-CM

## 2021-08-02 DIAGNOSIS — Z79.4 TYPE 2 DIABETES MELLITUS WITH MICROALBUMINURIA, WITH LONG-TERM CURRENT USE OF INSULIN (H): ICD-10-CM

## 2021-08-02 DIAGNOSIS — R80.9 TYPE 2 DIABETES MELLITUS WITH MICROALBUMINURIA, WITH LONG-TERM CURRENT USE OF INSULIN (H): ICD-10-CM

## 2021-08-02 NOTE — TELEPHONE ENCOUNTER
Requested Prescriptions   Pending Prescriptions Disp Refills     insulin glargine (BASAGLAR KWIKPEN) 100 UNIT/ML pen 14.4 mL 11     Sig: Inject 48 Units Subcutaneous daily       There is no refill protocol information for this order        Last office visit: Visit date not found with prescribing provider:  Dr. Butts   Future Office Visit:          [facility]  Specialty Clinic CSS

## 2021-08-03 RX ORDER — INSULIN GLARGINE 100 [IU]/ML
48 INJECTION, SOLUTION SUBCUTANEOUS DAILY
Qty: 14.4 ML | Refills: 11 | Status: SHIPPED | OUTPATIENT
Start: 2021-08-03 | End: 2021-09-30

## 2021-08-19 ENCOUNTER — MYC MEDICAL ADVICE (OUTPATIENT)
Dept: ENDOCRINOLOGY | Facility: CLINIC | Age: 56
End: 2021-08-19

## 2021-08-19 DIAGNOSIS — E11.29 TYPE 2 DIABETES MELLITUS WITH MICROALBUMINURIA, WITH LONG-TERM CURRENT USE OF INSULIN (H): Primary | ICD-10-CM

## 2021-08-19 DIAGNOSIS — R80.9 TYPE 2 DIABETES MELLITUS WITH MICROALBUMINURIA, WITH LONG-TERM CURRENT USE OF INSULIN (H): Primary | ICD-10-CM

## 2021-08-19 DIAGNOSIS — Z79.4 TYPE 2 DIABETES MELLITUS WITH MICROALBUMINURIA, WITH LONG-TERM CURRENT USE OF INSULIN (H): Primary | ICD-10-CM

## 2021-08-24 RX ORDER — INSULIN GLARGINE 100 [IU]/ML
48 INJECTION, SOLUTION SUBCUTANEOUS AT BEDTIME
Qty: 43.2 ML | Refills: 0 | Status: CANCELLED | OUTPATIENT
Start: 2021-08-24 | End: 2021-11-22

## 2021-08-24 NOTE — TELEPHONE ENCOUNTER
Called and spoke with patient who states he is having issues with his prescriptions. He should be taking Bydureon, basaglar, Jardiance, and Actos however has been off Bydureon for the past few weeks because insurance will not cover it. He is also being told that his insurance soon will not cover basaglar and he will have to pay $385 for a 90 day supply. He is on his last box of basaglar. He is not having issues with other medications and continues to take 25 mg Jardiance daily and Actos 15 mg daily. He said his blood sugars haven't changed much since being off of Bydureon. BG levels are up 10-15 points. He has been stressed lately due to life circumstances and has been eating later in the evening.    Contacted [facility] and was informed that basaglar is not covered and Bydureon is not covered as it is a refill too soon. Lantus or Toujeo are covered alternatives to basaglar. Pharmacist James was unsure why the refill for Bydureon is noted as too soon as patient has not picked up a prescription recently.    Dr. Butts, is it okay to switch basaglar prescription to lantus? Will patient be okay just being on Actos, Jardiance, and lantus and not Bydureon?     Darryl BAUMANN RN....8/24/2021 2:41 PM

## 2021-09-13 ENCOUNTER — TELEPHONE (OUTPATIENT)
Dept: ENDOCRINOLOGY | Facility: CLINIC | Age: 56
End: 2021-09-13

## 2021-09-13 NOTE — TELEPHONE ENCOUNTER
Prior Authorization Retail Medication Request    Medication/Dose:   ICD code (if different than what is on RX):  E11.65, Z79.4  Previously Tried and Failed:    Rationale:      Insurance Name:    Insurance ID:        Pharmacy Information (if different than what is on RX)  Name: [facility]  Phone:  [phone]

## 2021-09-14 NOTE — TELEPHONE ENCOUNTER
PA Initiation    Medication: JARDIANCE 25 MG TABS tablet- INITITAED  Insurance Company: [employer] ([facility]) - Phone [phone] Fax [phone]  Pharmacy Filling the Rx: [facility] #[id number] - [place] - [address] AT [facility] & [place]  Filling Pharmacy Phone: [phone]  Filling Pharmacy Fax: [phone]  Start Date: 9/14/2021

## 2021-09-15 NOTE — TELEPHONE ENCOUNTER
Prior Authorization Approval    Authorization Effective Date: 9/14/2021  Authorization Expiration Date: 9/14/2022  Medication: JARDIANCE 25 MG TABS tablet- APPROVED  Approved Dose/Quantity: 30 TABS  Reference #: [id number]   Insurance Company: [employer] ([facility]) - Phone [phone] Fax [phone]  Expected CoPay:       CoPay Card Available:      Foundation Assistance Needed:    Which Pharmacy is filling the prescription (Not needed for infusion/clinic administered): [id number] - [place], [place] - [address] AT [facility] & [place]  Pharmacy Notified: Yes  Patient Notified: Yes          Central Prior Authorization Team  Phone: [phone]

## 2021-09-16 ENCOUNTER — MYC MEDICAL ADVICE (OUTPATIENT)
Dept: UROLOGY | Facility: CLINIC | Age: 56
End: 2021-09-16

## 2021-09-16 DIAGNOSIS — E11.65 TYPE 2 DIABETES MELLITUS WITH HYPERGLYCEMIA, WITH LONG-TERM CURRENT USE OF INSULIN (H): ICD-10-CM

## 2021-09-16 DIAGNOSIS — Z79.4 TYPE 2 DIABETES MELLITUS WITH HYPERGLYCEMIA, WITH LONG-TERM CURRENT USE OF INSULIN (H): ICD-10-CM

## 2021-09-17 NOTE — TELEPHONE ENCOUNTER
order placed. Original was signed under incorrect MD as message was started under incorrect MD, cx this order    Jackie REBOLLEDO RN Specialty Triage 9/17/2021 9:19 AM

## 2021-09-20 ENCOUNTER — TRANSFERRED RECORDS (OUTPATIENT)
Dept: HEALTH INFORMATION MANAGEMENT | Facility: CLINIC | Age: 56
End: 2021-09-20

## 2021-09-21 ENCOUNTER — MYC MEDICAL ADVICE (OUTPATIENT)
Dept: ENDOCRINOLOGY | Facility: CLINIC | Age: 56
End: 2021-09-21

## 2021-09-28 ASSESSMENT — ENCOUNTER SYMPTOMS
NECK PAIN: 0
MUSCLE WEAKNESS: 0
ARTHRALGIAS: 1
MYALGIAS: 1
BACK PAIN: 0
STIFFNESS: 1
JOINT SWELLING: 1
MUSCLE CRAMPS: 1

## 2021-09-30 ENCOUNTER — VIRTUAL VISIT (OUTPATIENT)
Dept: ENDOCRINOLOGY | Facility: CLINIC | Age: 56
End: 2021-09-30
Payer: COMMERCIAL

## 2021-09-30 DIAGNOSIS — E11.29 TYPE 2 DIABETES MELLITUS WITH MICROALBUMINURIA, WITH LONG-TERM CURRENT USE OF INSULIN (H): Primary | ICD-10-CM

## 2021-09-30 DIAGNOSIS — R80.9 TYPE 2 DIABETES MELLITUS WITH MICROALBUMINURIA, WITH LONG-TERM CURRENT USE OF INSULIN (H): Primary | ICD-10-CM

## 2021-09-30 DIAGNOSIS — E78.5 DYSLIPIDEMIA: ICD-10-CM

## 2021-09-30 DIAGNOSIS — Z79.4 TYPE 2 DIABETES MELLITUS WITH MICROALBUMINURIA, WITH LONG-TERM CURRENT USE OF INSULIN (H): Primary | ICD-10-CM

## 2021-09-30 DIAGNOSIS — I10 ESSENTIAL HYPERTENSION: ICD-10-CM

## 2021-09-30 DIAGNOSIS — R80.9 MICROALBUMINURIA: ICD-10-CM

## 2021-09-30 PROCEDURE — 99214 OFFICE O/P EST MOD 30 MIN: CPT | Mod: GT | Performed by: INTERNAL MEDICINE

## 2021-09-30 NOTE — PROGRESS NOTES
Gato is a 56 year old who is being evaluated via a billable video visit.      How would you like to obtain your AVS? MyChart  If the video visit is dropped, the invitation should be resent by:   Will anyone else be joining your video visit? No      Video Start Time: 9:16 AM    S:   Patient presents for management of DM.   Estimates he was diagnosed 10 years ago on routine labs.     He has been seeing Dr Ibarra.     No longer on bydureon 2/2 cost.   Remarks that he needs to get knee surgery and was told to get his glucose down.  Began a low carb diet 2 weeks ago.   Continues metformin, jardiance, actos.   He has stopped taking basaglar.   Checking AM and pre-dinner.   AM .   Pre-dinner 120's      ROS: 10 point ROS neg other than the symptoms noted above in the HPI.    Exam:   GENERAL: Healthy, alert and no distress  EYES: Eyes grossly normal to inspection.  No discharge or erythema, or obvious scleral/conjunctival abnormalities.  RESP: No audible wheeze, cough, or visible cyanosis.  No visible retractions or increased work of breathing.    SKIN: Visible skin clear. No significant rash, abnormal pigmentation or lesions.  NEURO: Cranial nerves grossly intact.  Mentation and speech appropriate for age.  PSYCH: Mentation appears normal, affect normal/bright, judgement and insight intact, normal speech and appearance well-groomed.     A/P:   Type 2 DM - Outside records reviewed. Dose limited by neuropathy which is aggravated by large changes in glucose per history. He is wondering if he is still making insulin. Discussed reports about GLP-1 and DDP-4 inhibitor with pancreatitis and pancreatic cancer. Although I cannot definitively say the patient will not get pancreatitis or pancreatic cancer, to date there is noconclusive evidence of a causal link with these agents for pancreatic cancer. There is a slight increase in risk of pancreatitis but overall risk still low. Persons with DM tend to have more pancreatitis  and pancreatic cancer de derek. He liked bydureon in the past but it became expensive.   Labs from 8/2020 consistent with type 2 DM. Rx for bydureon.   In 4/2021, discussed actos versus meal time insulin.   In 9/2021, recent significant improvement with low carb diet.   -Continue to hold insulin for now.   -Continue metformin, actos, and jardiance for now. (Would stop actos first based on results)  -Labs in 2-3 weeks.   -ASA use optional.  -NAFL/NAIR: normal ALT and AST in 3/2021.   -Microalbumin normal in 3/2018. ACEi not indicated.    67.51 in 3/2021. Discussed significance.    67.92 in 7/2021.    Continue ACEi.   -Eyes: No DR in 11/2020.   -Smoking: none.      Elevated blood pressure - Reports home readings 147/92, 147/95, 148/102, 152/103.  Normalized in 7/2021. Continue ACEi.     Dyslipidemia - Trg 264, HDL 26, LDL 29 in 9/2017. He has not been on a statin.   Trg 588, HDL 24, LDL 56  in 3/2021. Discussed risk of pancreatitis if Trg increase further.   Start atorvastatin 40 mg every day in 4/2021.   Trg 120, HDL 34, LDL 31 in 7/2021.   -Continue statin.   -DM management as above.     Wiliam Butts MD on 9/30/2021 at 9:34 AM      Video-Visit Details    Type of service:  Video Visit    Video End Time:9:34 AM    Originating Location (pt. Location): Home    Distant Location (provider location):  [facility] ENDOCRINOLOGY     Platform used for Video Visit: [employer]

## 2021-10-15 ENCOUNTER — VIRTUAL VISIT (OUTPATIENT)
Dept: ENDOCRINOLOGY | Facility: CLINIC | Age: 56
End: 2021-10-15
Payer: COMMERCIAL

## 2021-10-15 DIAGNOSIS — Z79.4 TYPE 2 DIABETES MELLITUS WITH MICROALBUMINURIA, WITH LONG-TERM CURRENT USE OF INSULIN (H): ICD-10-CM

## 2021-10-15 DIAGNOSIS — E11.29 TYPE 2 DIABETES MELLITUS WITH MICROALBUMINURIA, WITH LONG-TERM CURRENT USE OF INSULIN (H): ICD-10-CM

## 2021-10-15 DIAGNOSIS — R80.9 TYPE 2 DIABETES MELLITUS WITH MICROALBUMINURIA, WITH LONG-TERM CURRENT USE OF INSULIN (H): ICD-10-CM

## 2021-10-15 PROCEDURE — 99214 OFFICE O/P EST MOD 30 MIN: CPT | Mod: GT | Performed by: INTERNAL MEDICINE

## 2021-10-15 ASSESSMENT — ENCOUNTER SYMPTOMS
MUSCLE WEAKNESS: 0
STIFFNESS: 1
NECK PAIN: 0
MUSCLE CRAMPS: 1
JOINT SWELLING: 0
MYALGIAS: 0
BACK PAIN: 0
ARTHRALGIAS: 1

## 2021-10-15 NOTE — LETTER
10/15/2021         RE: Miquel Llanes  [phone] [id number]        Dear Colleague,    Thank you for referring your patient, Miquel Llanes, to the [facility]. Please see a copy of my visit note below.    Gato is a 56 year old who is being evaluated via a billable video visit.      How would you like to obtain your AVS? MyChart  If the video visit is dropped, the invitation should be resent by:   Will anyone else be joining your video visit? No      Video Start Time: 10:39 AM       S:   Patient presents for management of DM.   Estimates he was diagnosed 10 years ago on routine labs.     He has been seeing Dr Ibarra.     No longer on bydureon 2/2 cost.   Remarks that he needs to get knee surgery and was told to get his glucose down.  Began a low carb diet.   Continues metformin, jardiance, actos.   He has stopped taking basaglar.     He has been drinking a protein shake in the evening which has helped control appetite. Notes his weight is stable.     Checking 3/day.   -160  Pre-dinner 140-270  1 hour post dinner. High 100's.       ROS: 10 point ROS neg other than the symptoms noted above in the HPI.    Exam:   GENERAL: Healthy, alert and no distress  EYES: Eyes grossly normal to inspection.  No discharge or erythema, or obvious scleral/conjunctival abnormalities.  RESP: No audible wheeze, cough, or visible cyanosis.  No visible retractions or increased work of breathing.    SKIN: Visible skin clear. No significant rash, abnormal pigmentation or lesions.  NEURO: Cranial nerves grossly intact.  Mentation and speech appropriate for age.  PSYCH: Mentation appears normal, affect normal/bright, judgement and insight intact, normal speech and appearance well-groomed.     A/P:   Type 2 DM - Outside records reviewed. Dose limited by neuropathy which is aggravated by large changes in glucose per history. He is wondering if he is still making insulin. Discussed reports about  GLP-1 and DDP-4 inhibitor with pancreatitis and pancreatic cancer. Although I cannot definitively say the patient will not get pancreatitis or pancreatic cancer, to date there is noconclusive evidence of a causal link with these agents for pancreatic cancer. There is a slight increase in risk of pancreatitis but overall risk still low. Persons with DM tend to have more pancreatitis and pancreatic cancer de derek. He liked bydureon in the past but it became expensive.   Labs from 8/2020 consistent with type 2 DM. Rx for bydureon.   In 4/2021, discussed actos versus meal time insulin.   In 9/2021, recent significant improvement with low carb diet.   In 10/2021, improved control. Weight stable. Discussed glipizide if needed.   -Schedule labs.   -No change to medications today.   -If needed, we will add on glipizide.   -ASA use optional.  -NAFL/NAIR: normal ALT and AST in 3/2021.   -Microalbumin normal in 3/2018. ACEi not indicated.    67.51 in 3/2021. Discussed significance.    67.92 in 7/2021.    Continue ACEi.   -Eyes: No DR in 11/2020.  -Smoking: none.      Elevated blood pressure - Reports home readings 147/92, 147/95, 148/102, 152/103.  Normalized in 7/2021. Continue ACEi.     Dyslipidemia - Trg 264, HDL 26, LDL 29 in 9/2017. He has not been on a statin.   Trg 588, HDL 24, LDL 56  in 3/2021. Discussed risk of pancreatitis if Trg increase further.   Start atorvastatin 40 mg every day in 4/2021.   Trg 120, HDL 34, LDL 31 in 7/2021.   -Continue statin.   -DM management as above.       Video-Visit Details    Type of service:  Video Visit    Video End Time: 10:56 AM    Originating Location (pt. Location): Home    Distant Location (provider location):  [facility]     Platform used for Video Visit: Gavin Butts MD on 10/15/2021 at 10:56 AM    Answers for HPI/ROS submitted by the patient on 10/15/2021  General Symptoms: No  Skin Symptoms: No  HENT Symptoms: No  EYE SYMPTOMS:  No  HEART SYMPTOMS: No  LUNG SYMPTOMS: No  INTESTINAL SYMPTOMS: No  URINARY SYMPTOMS: No  REPRODUCTIVE SYMPTOMS: No  SKELETAL SYMPTOMS: Yes  BLOOD SYMPTOMS: No  NERVOUS SYSTEM SYMPTOMS: No  MENTAL HEALTH SYMPTOMS: No  Back pain: No  Muscle aches: No  Neck pain: No  Swollen joints: No  Joint pain: Yes  Bone pain: No  Muscle cramps: Yes  Muscle weakness: No  Joint stiffness: Yes  Bone fracture: No          Again, thank you for allowing me to participate in the care of your patient.        Sincerely,        Wiliam Butts MD

## 2021-10-15 NOTE — PROGRESS NOTES
Gato is a 56 year old who is being evaluated via a billable video visit.      How would you like to obtain your AVS? MyChart  If the video visit is dropped, the invitation should be resent by:   Will anyone else be joining your video visit? No      Video Start Time: 10:39 AM       S:   Patient presents for management of DM.   Estimates he was diagnosed 10 years ago on routine labs.     He has been seeing Dr Ibarra.     No longer on bydureon 2/2 cost.   Remarks that he needs to get knee surgery and was told to get his glucose down.  Began a low carb diet.   Continues metformin, jardiance, actos.   He has stopped taking basaglar.     He has been drinking a protein shake in the evening which has helped control appetite. Notes his weight is stable.     Checking 3/day.   -160  Pre-dinner 140-270  1 hour post dinner. High 100's.       ROS: 10 point ROS neg other than the symptoms noted above in the HPI.    Exam:   GENERAL: Healthy, alert and no distress  EYES: Eyes grossly normal to inspection.  No discharge or erythema, or obvious scleral/conjunctival abnormalities.  RESP: No audible wheeze, cough, or visible cyanosis.  No visible retractions or increased work of breathing.    SKIN: Visible skin clear. No significant rash, abnormal pigmentation or lesions.  NEURO: Cranial nerves grossly intact.  Mentation and speech appropriate for age.  PSYCH: Mentation appears normal, affect normal/bright, judgement and insight intact, normal speech and appearance well-groomed.     A/P:   Type 2 DM - Outside records reviewed. Dose limited by neuropathy which is aggravated by large changes in glucose per history. He is wondering if he is still making insulin. Discussed reports about GLP-1 and DDP-4 inhibitor with pancreatitis and pancreatic cancer. Although I cannot definitively say the patient will not get pancreatitis or pancreatic cancer, to date there is noconclusive evidence of a causal link with these agents for pancreatic  cancer. There is a slight increase in risk of pancreatitis but overall risk still low. Persons with DM tend to have more pancreatitis and pancreatic cancer de derek. He liked bydureon in the past but it became expensive.   Labs from 8/2020 consistent with type 2 DM. Rx for bydureon.   In 4/2021, discussed actos versus meal time insulin.   In 9/2021, recent significant improvement with low carb diet.   In 10/2021, improved control. Weight stable. Discussed glipizide if needed.   -Schedule labs.   -No change to medications today.   -If needed, we will add on glipizide.   -ASA use optional.  -NAFL/NAIR: normal ALT and AST in 3/2021.   -Microalbumin normal in 3/2018. ACEi not indicated.    67.51 in 3/2021. Discussed significance.    67.92 in 7/2021.    Continue ACEi.   -Eyes: No DR in 11/2020.  -Smoking: none.      Elevated blood pressure - Reports home readings 147/92, 147/95, 148/102, 152/103.  Normalized in 7/2021. Continue ACEi.     Dyslipidemia - Trg 264, HDL 26, LDL 29 in 9/2017. He has not been on a statin.   Trg 588, HDL 24, LDL 56  in 3/2021. Discussed risk of pancreatitis if Trg increase further.   Start atorvastatin 40 mg every day in 4/2021.   Trg 120, HDL 34, LDL 31 in 7/2021.   -Continue statin.   -DM management as above.       Video-Visit Details    Type of service:  Video Visit    Video End Time: 10:56 AM    Originating Location (pt. Location): Home    Distant Location (provider location):  [facility]     Platform used for Video Visit: Gavin Butts MD on 10/15/2021 at 10:56 AM    Answers for HPI/ROS submitted by the patient on 10/15/2021  General Symptoms: No  Skin Symptoms: No  HENT Symptoms: No  EYE SYMPTOMS: No  HEART SYMPTOMS: No  LUNG SYMPTOMS: No  INTESTINAL SYMPTOMS: No  URINARY SYMPTOMS: No  REPRODUCTIVE SYMPTOMS: No  SKELETAL SYMPTOMS: Yes  BLOOD SYMPTOMS: No  NERVOUS SYSTEM SYMPTOMS: No  MENTAL HEALTH SYMPTOMS: No  Back pain: No  Muscle aches:  No  Neck pain: No  Swollen joints: No  Joint pain: Yes  Bone pain: No  Muscle cramps: Yes  Muscle weakness: No  Joint stiffness: Yes  Bone fracture: No

## 2021-10-19 ENCOUNTER — APPOINTMENT (OUTPATIENT)
Dept: LAB | Facility: CLINIC | Age: 56
End: 2021-10-19
Payer: COMMERCIAL

## 2021-10-19 LAB — HBA1C MFR BLD: 9.1 % (ref 0–5.6)

## 2021-10-19 PROCEDURE — 36415 COLL VENOUS BLD VENIPUNCTURE: CPT | Performed by: INTERNAL MEDICINE

## 2021-10-19 PROCEDURE — 83036 HEMOGLOBIN GLYCOSYLATED A1C: CPT | Performed by: INTERNAL MEDICINE

## 2021-10-23 ENCOUNTER — HEALTH MAINTENANCE LETTER (OUTPATIENT)
Age: 56
End: 2021-10-23

## 2021-11-20 DIAGNOSIS — E11.29 TYPE 2 DIABETES MELLITUS WITH MICROALBUMINURIA, WITH LONG-TERM CURRENT USE OF INSULIN (H): ICD-10-CM

## 2021-11-20 DIAGNOSIS — R80.9 TYPE 2 DIABETES MELLITUS WITH MICROALBUMINURIA, WITH LONG-TERM CURRENT USE OF INSULIN (H): ICD-10-CM

## 2021-11-20 DIAGNOSIS — Z79.4 TYPE 2 DIABETES MELLITUS WITH MICROALBUMINURIA, WITH LONG-TERM CURRENT USE OF INSULIN (H): ICD-10-CM

## 2021-11-22 RX ORDER — INSULIN GLARGINE 100 [IU]/ML
INJECTION, SOLUTION SUBCUTANEOUS
Qty: 45 ML | OUTPATIENT
Start: 2021-11-22

## 2021-11-22 NOTE — TELEPHONE ENCOUNTER
Spoke with patient regarding refill request for Lantus. Patient states that he is not using insulin at this time. Will contact pharmacy if refills are needed.    Tomas TUCKER RN Specialty Clinic

## 2022-02-12 ENCOUNTER — HEALTH MAINTENANCE LETTER (OUTPATIENT)
Age: 57
End: 2022-02-12

## 2022-02-16 ENCOUNTER — TELEPHONE (OUTPATIENT)
Dept: ENDOCRINOLOGY | Facility: CLINIC | Age: 57
End: 2022-02-16
Payer: COMMERCIAL

## 2022-02-16 NOTE — TELEPHONE ENCOUNTER
Called patient had long discussion with him.  He had a visit with a MD who appears to not have been able to access [facility] information.  Patient was told that tests have not been done by this other doctor.  Reassured patient that the labs have been done and went over them with him.  Message sent back to provider for alternative, if one is available.  Patient mentioned he had gone to a provider for an out-of-pocket cost of $300 to be told that he is old and that is why he is not urinating as well.  Writer advised patient to follow-up with primary care doctor as there is a medication called tamsulosin that is frequently used in urology for this reason.    Jackie REBOLLEDO RN Specialty Triage 2/16/2022 12:53 PM

## 2022-02-16 NOTE — TELEPHONE ENCOUNTER
Patient called the clinic.  He reports experiencing cramping, muscle aches and high blood pressure readings X 1 months.  Patient has been waking up 2-3 times every night with symptoms.  Patient stopped taking the atorvastatin (LIPITOR) 40 MG tablet X 3 days and symptoms have resolved.    Patient does not want to continue taking atorvastatin (LIPITOR) 40 MG tablet .  He is inquiring about alternative medication for managing Dyslipidemia [E78.5] .    Patient was advised to call the clinic [phone] or seek medical assistance at the nearest ER or UC,  if the symptoms persist or worsen and will be called with the provider's recommendations.    Patient verbalized understanding and has no further questions or concerns at this time.

## 2022-03-05 DIAGNOSIS — Z79.4 TYPE 2 DIABETES MELLITUS WITH HYPERGLYCEMIA, WITH LONG-TERM CURRENT USE OF INSULIN (H): ICD-10-CM

## 2022-03-05 DIAGNOSIS — E11.65 TYPE 2 DIABETES MELLITUS WITH HYPERGLYCEMIA, WITH LONG-TERM CURRENT USE OF INSULIN (H): ICD-10-CM

## 2022-03-07 RX ORDER — EMPAGLIFLOZIN 25 MG/1
TABLET, FILM COATED ORAL
Qty: 30 TABLET | Refills: 11 | Status: CANCELLED | OUTPATIENT
Start: 2022-03-07

## 2022-03-25 DIAGNOSIS — Z79.4 TYPE 2 DIABETES MELLITUS WITH HYPERGLYCEMIA, WITH LONG-TERM CURRENT USE OF INSULIN (H): ICD-10-CM

## 2022-03-25 DIAGNOSIS — E11.65 TYPE 2 DIABETES MELLITUS WITH HYPERGLYCEMIA, WITH LONG-TERM CURRENT USE OF INSULIN (H): ICD-10-CM

## 2022-03-25 NOTE — TELEPHONE ENCOUNTER
Pending Prescriptions:                       Disp   Refills    empagliflozin (JARDIANCE) 25 MG TABS tabl*30 tab*0            Sig: Take 1 tablet (25 mg) by mouth daily [facility] ASAP [facility]           [facility]OCRINE PROVIDER [phone]           no more fills

## 2022-03-29 NOTE — TELEPHONE ENCOUNTER
empagliflozin (JARDIANCE) 25 MG TABS tablet      Last Written Prescription Date:  3/7/22  Last Fill Quantity: 30,   # refills: 0  Last Office Visit : Wiliam Butts MD  Endocrinology, Diabetes, and Metabolism  10/15/2021  [facility]  Future Office visit:  None scheduled    Routing refill request to provider for review/approval because:  No current endocrinology provider  Overdue for A1C  Lab Test 10/19/21  1316   A1C 9.1*

## 2022-04-03 DIAGNOSIS — E78.5 DYSLIPIDEMIA: ICD-10-CM

## 2022-04-03 DIAGNOSIS — E11.65 TYPE 2 DIABETES MELLITUS WITH HYPERGLYCEMIA, WITH LONG-TERM CURRENT USE OF INSULIN (H): ICD-10-CM

## 2022-04-03 DIAGNOSIS — Z79.4 TYPE 2 DIABETES MELLITUS WITH HYPERGLYCEMIA, WITH LONG-TERM CURRENT USE OF INSULIN (H): ICD-10-CM

## 2022-04-03 DIAGNOSIS — I10 ESSENTIAL HYPERTENSION: ICD-10-CM

## 2022-04-06 RX ORDER — LISINOPRIL 10 MG/1
10 TABLET ORAL DAILY
Qty: 90 TABLET | Refills: 3 | Status: SHIPPED | OUTPATIENT
Start: 2022-04-06 | End: 2022-06-08

## 2022-04-06 RX ORDER — EMPAGLIFLOZIN 25 MG/1
TABLET, FILM COATED ORAL
Qty: 30 TABLET | OUTPATIENT
Start: 2022-04-06

## 2022-04-06 RX ORDER — ATORVASTATIN CALCIUM 40 MG/1
40 TABLET, FILM COATED ORAL DAILY
Qty: 90 TABLET | Refills: 0 | Status: SHIPPED | OUTPATIENT
Start: 2022-04-06 | End: 2022-06-08

## 2022-04-06 RX ORDER — PIOGLITAZONEHYDROCHLORIDE 15 MG/1
15 TABLET ORAL DAILY
Qty: 90 TABLET | Refills: 3 | Status: SHIPPED | OUTPATIENT
Start: 2022-04-06 | End: 2022-06-08

## 2022-04-06 NOTE — TELEPHONE ENCOUNTER
empagliflozin (JARDIANCE) 25 MG TABS tablet  Take 1 tablet (25 mg) by mouth daily  Last Written Prescription Date:  3/29/22  Last Fill Quantity: 90,   # refills: 1  Last Office Visit : 10/15/21  No change to medications today.   Future Office visit:  none    Request too soon. Last ordered 3/29/22 for 60 day. Noted. **SCHEDULE APPOINTMENT TO ESTABLISH CARE WITH NEW PROVIDER**      pioglitazone (ACTOS) 15 MG tablet   Take 1 tablet (15 mg) by mouth daily      Last Written Prescription Date:  4/20/21  Last Fill Quantity: 90,   # refills: 3    Routing refill request to provider for review/approval because:  Overdue creatinine. Pended.   X Patient has documented A1c within the specified period of time.    If HgbA1C is 8 or greater, it needs to be on file within the past 3 months. Pended.     Lab Test 10/19/21  1316   A1C 9.1*       lisinopril (ZESTRIL) 10 MG tablet    Take 1 tablet (10 mg) by mouth daily     Last Written Prescription Date:  4/20/21  Last Fill Quantity: 90,   # refills: 3    Routing refill request to provider for review/approval because:  Overdue lab - creatinine. Pended.   Lab Test 03/29/21  1614   CR 0.71     Lab Test 03/29/21  1614   POTASSIUM 4.1       atorvastatin (LIPITOR) 40 MG tablet    Take 1 tablet (40 mg) by mouth daily     Last Written Prescription Date:  4/20/21  Last Fill Quantity: 90,   # refills: 3    90 day refill to pharmacy.   Lab Test 07/21/21  0815   LDL 31

## 2022-04-19 ENCOUNTER — VIRTUAL VISIT (OUTPATIENT)
Dept: EDUCATION SERVICES | Facility: CLINIC | Age: 57
End: 2022-04-19
Attending: INTERNAL MEDICINE
Payer: COMMERCIAL

## 2022-04-19 DIAGNOSIS — E11.65 TYPE 2 DIABETES MELLITUS WITH HYPERGLYCEMIA, WITH LONG-TERM CURRENT USE OF INSULIN (H): ICD-10-CM

## 2022-04-19 DIAGNOSIS — Z79.4 TYPE 2 DIABETES MELLITUS WITH HYPERGLYCEMIA, WITH LONG-TERM CURRENT USE OF INSULIN (H): ICD-10-CM

## 2022-04-19 PROCEDURE — G0108 DIAB MANAGE TRN  PER INDIV: HCPCS | Mod: TEL | Performed by: DIETITIAN, REGISTERED

## 2022-04-19 NOTE — PATIENT INSTRUCTIONS
GLP-1: Rybelsus, Victoza, Ozempic, Bydureon    Dr. Makenna- Endocrinologist at [facility] --- Call [phone] to schedule appointment    Stacy Lorenzo RD LD [facility]

## 2022-04-19 NOTE — LETTER
4/19/2022         RE: Miquel Llanes  [address] [address]        Dear Colleague,    Thank you for referring your patient, Miquel Llanes, to the [facility]. Please see a copy of my visit note below.    Diabetes Self-Management Education & Support    Presents for: Individual review    Type of Visit: Telephone Visit    How would patient like to obtain AVS? MyChart    ASSESSMENT:    Gato states that he has had poor experiencing with providers managing his diabetes since he was diagnosed. He states that he feels that some of the providers that he has seen have brushed off his symptoms and pushed medications. He states that he feels like his medications work for a while and then they stop working. Writer explains that its likely that his diabetes is just progressing vs. The meds not working. He states that his fasting BG was 200 mg/dl while he was on insulin and when he stopped taking his insulin his fasting BG remained around 200 mg/dl.  At one point he was waking Bydureon but it became too expensive. He is interested in GLP-1, he plans to call insurance company to see which GLP-1 it would cover.     We discussed healthy eating for diabetes. Recommended 60 grams of carbohydrates at each meal, recommended fiber rich carbohydrates. Discussed balancing plate with lean protein and non-starchy vegetables.     We discussed joyce phenomenon, he mentions that his fasting BG are more elevated when he doesn't have a snack at night. Recommended a snack of lean protein and fiber rich carbohydrates.     We discussed BG monitoring, Gato is interested in CGM. Will send to provider for approval.     Patient's most recent   Lab Results   Component Value Date    A1C 9.1 10/19/2021    A1C 8.9 03/29/2021    is not meeting goal of <7.0    Diabetes knowledge and skills assessment:   Patient is knowledgeable in diabetes management concepts related to: Monitoring and Taking Medication    Continue  education with the following diabetes management concepts: Healthy Eating, Being Active, Monitoring, Taking Medication, Problem Solving, Reducing Risks and Healthy Coping    Based on learning assessment above, most appropriate setting for further diabetes education would be: Individual setting.    INTERVENTIONS:    Education provided today on:  AADE Self-Care Behaviors:  Diabetes Pathophysiology  Healthy Eating: carbohydrate counting, consistency in amount, composition, and timing of food intake, weight reduction, portion control, plate planning method and label reading  Being Active: relationship to blood glucose and describe appropriate activity program  Monitoring: purpose, log and interpret results, individual blood glucose targets and frequency of monitoring  Taking Medication: action of prescribed medication, side effects of prescribed medications and when to take medications  Problem Solving: high blood glucose - causes, signs/symptoms, treatment and prevention    Opportunities for ongoing education and support in diabetes-self management were discussed. Pt verbalized understanding of concepts discussed and recommendations provided today.       Education Materials Provided:  [employer] Healthy Living with Diabetes Book, Carbohydrate Counting and My Plate Planner        PLAN    Gato will call insurance to check on coverage for GLP-1  Recommend CGM- routing to provider for approval  Aim for 60 grams of carbohydrates at each meal, balance plate with lean protein and non-starchy vegetables.     Topics to cover at upcoming visits: Healthy Eating, Being Active, Monitoring, Taking Medication, Problem Solving, Reducing Risks and Healthy Coping  Follow-up: 5/16    See Goals Section for co-developed, patient-stated behavior change goals.  AVS provided to patient today.          SUBJECTIVE / OBJECTIVE:  Presents for: Individual review  Accompanied by: Self  Diabetes education in the past 24mo: No  Focus of  "Visit: Problem Solving, Taking Medication, Healthy Eating  Diabetes type: Type 2  Date of diagnosis: 10+ years  Disease course: Worsening  Difficulty affording diabetes medication?: No  Difficulty affording diabetes testing supplies?: No  Other concerns:: None  Cultural Influences/Ethnic Background:  No    Diabetes Symptoms & Complications:  Fatigue: Yes  Neuropathy: Sometimes (stopped since stopping statin and insulin)  Polydipsia: Yes  Polyphagia: Yes  Polyuria: Yes  Visual change: Sometimes  Slow healing wounds: Sometimes  Symptom course: Stable  Weight trend: Stable  Complications assessed today?: Yes    Patient Problem List and Family Medical History reviewed for relevant medical history, current medical status, and diabetes risk factors.    Vitals:  There were no vitals taken for this visit.  Estimated body mass index is 35.37 kg/m  as calculated from the following:    Height as of 4/20/21: 1.761 m (5' 9.33\").    Weight as of 7/21/21: 109.7 kg (241 lb 12.8 oz).   Last 3 BP:   BP Readings from Last 3 Encounters:   07/21/21 136/86   05/21/21 111/78   04/20/21 (!) 158/84       History   Smoking Status     Never Smoker   Smokeless Tobacco     Never Used       Labs:  Lab Results   Component Value Date    A1C 9.1 10/19/2021    A1C 8.9 03/29/2021     Lab Results   Component Value Date     03/29/2021     Lab Results   Component Value Date    LDL 31 07/21/2021    LDL  03/29/2021     Cannot estimate LDL when triglyceride exceeds 400 mg/dL    LDL 56 03/29/2021     HDL Cholesterol   Date Value Ref Range Status   03/29/2021 24 (L) >39 mg/dL Final     Direct Measure HDL   Date Value Ref Range Status   07/21/2021 34 (L) >=40 mg/dL Final     Comment:     0-19 years:       Greater than or equal to 45 mg/dL   Low: Less than 40 mg/dL   Borderline low: 40-44 mg/dL     20 years and older:   Female: Greater than or equal to 50 mg/dL   Male:   Greater than or equal to 40 mg/dL        ]  GFR Estimate   Date Value Ref Range " Status   03/29/2021 >90 >60 mL/min/[1.73_m2] Final     Comment:     Non  GFR Calc  Starting 12/18/2018, serum creatinine based estimated GFR (eGFR) will be   calculated using the Chronic Kidney Disease Epidemiology Collaboration   (CKD-EPI) equation.       GFR Estimate If Black   Date Value Ref Range Status   03/29/2021 >90 >60 mL/min/[1.73_m2] Final     Comment:      GFR Calc  Starting 12/18/2018, serum creatinine based estimated GFR (eGFR) will be   calculated using the Chronic Kidney Disease Epidemiology Collaboration   (CKD-EPI) equation.       Lab Results   Component Value Date    CR 0.71 03/29/2021     No results found for: MICROALBUMIN    Healthy Eating:  Healthy Eating Assessed Today: Yes  Meals include: Dinner, Lunch, Breakfast  Breakfast: 9:00 am: meat stick and cheese stick and hard boiled egg  Lunch: 1 sandwich, 2 hard boiled egg  Dinner: [employer] Dinner - meat loaf or mac and cheese or stir mitchell OR wife will cook  Snacks: wheat thins, triscuits, cheese and crackers, meat and cheese sticks ([employer] string cheese and meat sticks), bean and cheese dip with chips  Beverages: Water, Diet soda, Milk  Has patient met with a dietitian in the past?: No    Being Active:  Being Active Assessed Today: Yes  Exercise:: Yes (staying active with yardwork, wants to start biking and inline skating)    Monitoring:  Monitoring Assessed Today: Yes  Did patient bring glucose meter to appointment? : No  Times checking blood sugar at home (number): 1  Times checking blood sugar at home (per): Day  Blood glucose trend: No change    Glucose data:  DId not review BG today    Taking Medications:  Diabetes Medication(s)     Insulin       insulin glargine (LANTUS PEN) 100 UNIT/ML pen    Inject 48 Units Subcutaneous At Bedtime    Sodium-Glucose Co-Transporter 2 (SGLT2) Inhibitors       empagliflozin (JARDIANCE) 25 MG TABS tablet    Take 1 tablet (25 mg) by mouth daily Call clinic to schedule follow  up appointment.    Sulfonylureas       glipiZIDE (GLUCOTROL XL) 10 MG 24 hr tablet    Take 1 tablet (10 mg) by mouth daily    Insulin Sensitizing Agents       pioglitazone (ACTOS) 15 MG tablet    Take 1 tablet (15 mg) by mouth daily .**SCHEDULE APPOINTMENT TO ESTABLISH CARE WITH NEW PROVIDER**          Taking Medication Assessed Today: Yes  Current Treatments: Diet, Oral Medication (taken by mouth), Non-insulin Injectables  Problems taking diabetes medications regularly?: Yes  Diabetes medication side effects?: Yes    Problem Solving:  Problem Solving Assessed Today: Yes  Is the patient at risk for hypoglycemia?: Yes  Hypoglycemia Frequency: Rarely  Is the patient at risk for DKA?: No  Does patient have severe weather/disaster plan for diabetes management?: No  Does patient have sick day plan for diabetes management?: No              Reducing Risks:  Reducing Risks Assessed Today: Yes  Diabetes Risks: Age over 45 years, Sedentary Lifestyle, Family History  CAD Risks: Diabetes Mellitus, Sedentary lifestyle, Family history, Male sex, Obesity, Hypertension  Has dilated eye exam at least once a year?: Yes (saw one last year)  Sees dentist every 6 months?: No (it has been one year)  Feet checked by healthcare provider in the last year?: Yes    Healthy Coping:  Healthy Coping Assessed Today: Yes  Support resources: None  Patient Activation Measure Survey Score:  No flowsheet data found.      [facility]  Time Spent: 60 minutes  Encounter Type: Individual    Any diabetes medication dose changes were made via the Certified Diabetes Care & Education Protocol in collaboration with the patient's referring provider. A copy of this encounter was shared with the provider.

## 2022-04-19 NOTE — PROGRESS NOTES
Diabetes Self-Management Education & Support    Presents for: Individual review    Type of Visit: Telephone Visit    How would patient like to obtain AVS? Judy    ASSESSMENT:    Gato states that he has had poor experiencing with providers managing his diabetes since he was diagnosed. He states that he feels that some of the providers that he has seen have brushed off his symptoms and pushed medications. He states that he feels like his medications work for a while and then they stop working. Writer explains that its likely that his diabetes is just progressing vs. The meds not working. He states that his fasting BG was 200 mg/dl while he was on insulin and when he stopped taking his insulin his fasting BG remained around 200 mg/dl.  At one point he was waking Bydureon but it became too expensive. He is interested in GLP-1, he plans to call insurance company to see which GLP-1 it would cover.     We discussed healthy eating for diabetes. Recommended 60 grams of carbohydrates at each meal, recommended fiber rich carbohydrates. Discussed balancing plate with lean protein and non-starchy vegetables.     We discussed joyce phenomenon, he mentions that his fasting BG are more elevated when he doesn't have a snack at night. Recommended a snack of lean protein and fiber rich carbohydrates.     We discussed BG monitoring, Gato is interested in CGM. Will send to provider for approval.     Patient's most recent   Lab Results   Component Value Date    A1C 9.1 10/19/2021    A1C 8.9 03/29/2021    is not meeting goal of <7.0    Diabetes knowledge and skills assessment:   Patient is knowledgeable in diabetes management concepts related to: Monitoring and Taking Medication    Continue education with the following diabetes management concepts: Healthy Eating, Being Active, Monitoring, Taking Medication, Problem Solving, Reducing Risks and Healthy Coping    Based on learning assessment above, most appropriate setting for further  diabetes education would be: Individual setting.    INTERVENTIONS:    Education provided today on:  AADE Self-Care Behaviors:  Diabetes Pathophysiology  Healthy Eating: carbohydrate counting, consistency in amount, composition, and timing of food intake, weight reduction, portion control, plate planning method and label reading  Being Active: relationship to blood glucose and describe appropriate activity program  Monitoring: purpose, log and interpret results, individual blood glucose targets and frequency of monitoring  Taking Medication: action of prescribed medication, side effects of prescribed medications and when to take medications  Problem Solving: high blood glucose - causes, signs/symptoms, treatment and prevention    Opportunities for ongoing education and support in diabetes-self management were discussed. Pt verbalized understanding of concepts discussed and recommendations provided today.       Education Materials Provided:  [employer] Healthy Living with Diabetes Book, Carbohydrate Counting and My Plate Planner        PLAN    Gato will call insurance to check on coverage for GLP-1  Recommend CGM- routing to provider for approval  Aim for 60 grams of carbohydrates at each meal, balance plate with lean protein and non-starchy vegetables.     Topics to cover at upcoming visits: Healthy Eating, Being Active, Monitoring, Taking Medication, Problem Solving, Reducing Risks and Healthy Coping  Follow-up: 5/16    See Goals Section for co-developed, patient-stated behavior change goals.  AVS provided to patient today.          SUBJECTIVE / OBJECTIVE:  Presents for: Individual review  Accompanied by: Self  Diabetes education in the past 24mo: No  Focus of Visit: Problem Solving, Taking Medication, Healthy Eating  Diabetes type: Type 2  Date of diagnosis: 10+ years  Disease course: Worsening  Difficulty affording diabetes medication?: No  Difficulty affording diabetes testing supplies?: No  Other  "concerns:: None  Cultural Influences/Ethnic Background:  No    Diabetes Symptoms & Complications:  Fatigue: Yes  Neuropathy: Sometimes (stopped since stopping statin and insulin)  Polydipsia: Yes  Polyphagia: Yes  Polyuria: Yes  Visual change: Sometimes  Slow healing wounds: Sometimes  Symptom course: Stable  Weight trend: Stable  Complications assessed today?: Yes    Patient Problem List and Family Medical History reviewed for relevant medical history, current medical status, and diabetes risk factors.    Vitals:  There were no vitals taken for this visit.  Estimated body mass index is 35.37 kg/m  as calculated from the following:    Height as of 4/20/21: 1.761 m (5' 9.33\").    Weight as of 7/21/21: 109.7 kg (241 lb 12.8 oz).   Last 3 BP:   BP Readings from Last 3 Encounters:   07/21/21 136/86   05/21/21 111/78   04/20/21 (!) 158/84       History   Smoking Status     Never Smoker   Smokeless Tobacco     Never Used       Labs:  Lab Results   Component Value Date    A1C 9.1 10/19/2021    A1C 8.9 03/29/2021     Lab Results   Component Value Date     03/29/2021     Lab Results   Component Value Date    LDL 31 07/21/2021    LDL  03/29/2021     Cannot estimate LDL when triglyceride exceeds 400 mg/dL    LDL 56 03/29/2021     HDL Cholesterol   Date Value Ref Range Status   03/29/2021 24 (L) >39 mg/dL Final     Direct Measure HDL   Date Value Ref Range Status   07/21/2021 34 (L) >=40 mg/dL Final     Comment:     0-19 years:       Greater than or equal to 45 mg/dL   Low: Less than 40 mg/dL   Borderline low: 40-44 mg/dL     20 years and older:   Female: Greater than or equal to 50 mg/dL   Male:   Greater than or equal to 40 mg/dL        ]  GFR Estimate   Date Value Ref Range Status   03/29/2021 >90 >60 mL/min/[1.73_m2] Final     Comment:     Non  GFR Calc  Starting 12/18/2018, serum creatinine based estimated GFR (eGFR) will be   calculated using the Chronic Kidney Disease Epidemiology Collaboration "   (CKD-EPI) equation.       GFR Estimate If Black   Date Value Ref Range Status   03/29/2021 >90 >60 mL/min/[1.73_m2] Final     Comment:      GFR Calc  Starting 12/18/2018, serum creatinine based estimated GFR (eGFR) will be   calculated using the Chronic Kidney Disease Epidemiology Collaboration   (CKD-EPI) equation.       Lab Results   Component Value Date    CR 0.71 03/29/2021     No results found for: MICROALBUMIN    Healthy Eating:  Healthy Eating Assessed Today: Yes  Meals include: Dinner, Lunch, Breakfast  Breakfast: 9:00 am: meat stick and cheese stick and hard boiled egg  Lunch: 1 sandwich, 2 hard boiled egg  Dinner: [employer] Dinner - meat loaf or mac and cheese or stir [employer] OR wife will cook  Snacks: wheat thins, triscuits, cheese and crackers, meat and cheese sticks ([employer] string cheese and meat sticks), bean and cheese dip with chips  Beverages: Water, Diet soda, Milk  Has patient met with a dietitian in the past?: No    Being Active:  Being Active Assessed Today: Yes  Exercise:: Yes (staying active with yardwork, wants to start biking and inline skating)    Monitoring:  Monitoring Assessed Today: Yes  Did patient bring glucose meter to appointment? : No  Times checking blood sugar at home (number): 1  Times checking blood sugar at home (per): Day  Blood glucose trend: No change    Glucose data:  DId not review BG today    Taking Medications:  Diabetes Medication(s)     Insulin       insulin glargine (LANTUS PEN) 100 UNIT/ML pen    Inject 48 Units Subcutaneous At Bedtime    Sodium-Glucose Co-Transporter 2 (SGLT2) Inhibitors       empagliflozin (JARDIANCE) 25 MG TABS tablet    Take 1 tablet (25 mg) by mouth daily Call clinic to schedule follow up appointment.    Sulfonylureas       glipiZIDE (GLUCOTROL XL) 10 MG 24 hr tablet    Take 1 tablet (10 mg) by mouth daily    Insulin Sensitizing Agents       pioglitazone (ACTOS) 15 MG tablet    Take 1 tablet (15 mg) by mouth daily .**SCHEDULE  APPOINTMENT TO ESTABLISH CARE WITH NEW PROVIDER**          Taking Medication Assessed Today: Yes  Current Treatments: Diet, Oral Medication (taken by mouth), Non-insulin Injectables  Problems taking diabetes medications regularly?: Yes  Diabetes medication side effects?: Yes    Problem Solving:  Problem Solving Assessed Today: Yes  Is the patient at risk for hypoglycemia?: Yes  Hypoglycemia Frequency: Rarely  Is the patient at risk for DKA?: No  Does patient have severe weather/disaster plan for diabetes management?: No  Does patient have sick day plan for diabetes management?: No              Reducing Risks:  Reducing Risks Assessed Today: Yes  Diabetes Risks: Age over 45 years, Sedentary Lifestyle, Family History  CAD Risks: Diabetes Mellitus, Sedentary lifestyle, Family history, Male sex, Obesity, Hypertension  Has dilated eye exam at least once a year?: Yes (saw one last year)  Sees dentist every 6 months?: No (it has been one year)  Feet checked by healthcare provider in the last year?: Yes    Healthy Coping:  Healthy Coping Assessed Today: Yes  Support resources: None  Patient Activation Measure Survey Score:  No flowsheet data found.      [facility]  Time Spent: 60 minutes  Encounter Type: Individual    Any diabetes medication dose changes were made via the Certified Diabetes Care & Education Protocol in collaboration with the patient's referring provider. A copy of this encounter was shared with the provider.

## 2022-04-20 ENCOUNTER — TELEPHONE (OUTPATIENT)
Dept: FAMILY MEDICINE | Facility: CLINIC | Age: 57
End: 2022-04-20
Payer: COMMERCIAL

## 2022-04-20 ENCOUNTER — TELEPHONE (OUTPATIENT)
Dept: EDUCATION SERVICES | Facility: CLINIC | Age: 57
End: 2022-04-20
Payer: COMMERCIAL

## 2022-04-20 DIAGNOSIS — R80.9 TYPE 2 DIABETES MELLITUS WITH MICROALBUMINURIA, WITH LONG-TERM CURRENT USE OF INSULIN (H): Primary | ICD-10-CM

## 2022-04-20 DIAGNOSIS — Z79.4 TYPE 2 DIABETES MELLITUS WITH MICROALBUMINURIA, WITH LONG-TERM CURRENT USE OF INSULIN (H): Primary | ICD-10-CM

## 2022-04-20 DIAGNOSIS — E11.29 TYPE 2 DIABETES MELLITUS WITH MICROALBUMINURIA, WITH LONG-TERM CURRENT USE OF INSULIN (H): Primary | ICD-10-CM

## 2022-04-20 NOTE — TELEPHONE ENCOUNTER
Cardiology Progress Note  1/6/2018     Admit Date: 1/2/2018  Admit Diagnosis: Respiratory failure ([facility]  CC: none currently  Pulm: Dr Carlos Mcpherson Assessment/Plan:   Afib: Paroxysmal; Brief duration (few hrs) during admit 10/2017: started on dilt gtt: NSR spont after dilt gtt; Pt reports no Sx even with RVR.     Rec 10/2017: changed home norvasc (5) to dilt ; If recurrent afib, likely change dilt to metoprolol and add multaq. CHADS-Vasc: 4 (should be on anticoag if recurrent/persistent afib).     Recurrent Afib evening of 1/4 (approx 10 hrs): No Sx. With recurrent ASx afib, I again rec anticoagulation (pt declined in 10/2017 for pulm HTN and is unsure if she wants anticoag now: CM to look into cost). Changed dilt to toprol XL and add multaq; watch QTc, neha with levoflox.      HTN: med change as above.      Cor pulmonale/pulm HTN: significant when checked in 2011;   Echo 10/3/17: EF55%. RVE. Mild AS (mean 8). Severe TR. PAp 78.      Volume status: looks euvolemic; on home dose of HCTZ; previously on PO lasix (40). Renal function: stable      RBBB: chronic. PPM not indicated.      ID (PNA?), COPD/cor pulmonale/Resp failure, Dispo: all per primary team.     12/6: NSR; tolerating multaq; QTc 493.  Again, she should be anticoagulated.     Hospital Problem List:  Active Problems:    Respiratory failure ([facility] (1/2/2018)         ____________________________________________________________________  Jose Beat is a [de-identified] y.o. female presents with Respiratory failure ([facility]    As noted in H&P: \"81 y.o.   female with pmhx significant fo pulmonary htn, diastolic chf, COPD on home 3LNC, HTN, present to ED c/o progressively worsening of sob stated 2 days ago. Lennie Car associated symptoms including fever ~102.  She also complains of associated productive cough.  Per pt's , home O2 turned up \"all the way\", however, pt remains hypoxic, tachypneic.    In the ER, vitals: T 10.7, PA Initiation    Medication: Freestyle Yovanny 2 PA Pending  Insurance Company: [employer] ([facility]) - Phone [phone] Fax [phone]  Pharmacy Filling the Rx:    Filling Pharmacy Phone:    Filling Pharmacy Fax:    Start Date: 4/20/2022     P 63, /53, SpO2 88% on 4LNC.  Pt denies any cp, palpitations, n/v/d. Pertinent labs: WBC 26.2, , hgb 12.1, Cr 1.07, trop neg, flu neg. CXR concerning for PNA\"     ______________________________________________________________________     As previously noted: \"The patient reports no CP; LUDWIG as above.     No PND, orthopnea, palpitations, pre-syncope, syncope, peripheral edema, or decrease in exercise tolerance. No current complaints.     ECG: sinus tachy then afib.     Tele: sinus then afib; now sinus.     CXR: \"Pulmonary hyperinflation and slight interstitial prominence\"     Notable labs: WBC26 to 11; Hg 12 to 10.1; BUN 29 to 39; nl Cr. ProBNP only 520. Neg Ck/trop x1.     Notable prior cardiac history:  Pafib 10/2017; Not Sx: reportedly declined anticoag. \"     For other plans, see orders.   Hospital problem list   Active Hospital Problems    Diagnosis Date Noted    [facility] 2018        Subjective: Shawnee Hassan reports   Chest pain X none  consistent with:  Non-cardiac CP         Atypical CP     None now  On going  Anginal CP     Dyspnea  none  at rest  with exertion        x improved  unchanged  worse              PND X none  overnight       Orthopnea X none  improved  unchanged  worse   Presyncope X none  improved  unchanged  worse     Ambulated in hallway without symptoms   Yes   Ambulated in room without symptoms  Yes   Objective:    Physical Exam:  Overall VSSAF;    Visit Vitals    /50    Pulse 91    Temp 97.7 °F (36.5 °C)    Resp 20    Ht 5' 3\" (1.6 m)    Wt 81.7 kg (180 lb 1.9 oz)    SpO2 93%    BMI 31.91 kg/m2     Temp (24hrs), Av.8 °F (36.6 °C), Min:97.6 °F (36.4 °C), Max:98.3 °F (36.8 °C)    Patient Vitals for the past 8 hrs:   Pulse   18 0852 91   18 0732 74    Patient Vitals for the past 8 hrs:   Resp   18 0732 20    Patient Vitals for the past 8 hrs:   BP   18 0852 141/50   18 0732 156/72        Intake/Output Summary (Last 24 hours) at 01/06/18 1114  Last data filed at 01/05/18 1936   Gross per 24 hour   Intake              300 ml   Output                0 ml   Net              300 ml     General Appearance: Well developed, well nourished, no acute distress. Ears/Nose/Mouth/Throat:   Normal MM; anicteric. JVP: WNL   Resp:   clear to auscultation bilaterally. Nl resp effort. Cardiovascular:  RRR, S1, S2 normal, no new murmur. No gallop or rub. Abdomen:   Soft, non-tender, bowel sounds are present. Extremities: No edema bilaterally. Skin:  Neuro: Warm and dry. A/O x3, grossly nonfocal   cath site intact w/o hematoma or new bruit; distal pulse unchanged  Yes   Data Review:     Telemetry independently reviewed x sinus  chronic afib  parox afib  NSVT     ECG independently reviewed  NSR  afib  no significant changes  NSST-Tw chgs   x no new ECG provided for review   Lab results reviewed as noted below. Current medications reviewed as noted below. No results for input(s): PH, PCO2, PO2 in the last 72 hours. No results for input(s): CPK, CKMB, CKNDX, TROIQ in the last 72 hours. Recent Labs      01/06/18   0123  01/05/18   0450  01/04/18   0444   NA  138  137  139   K  3.8  3.8  3.3*   CL  102  104  105   CO2  29  25  27   BUN  34*  39*  35*   CREA  0.87  0.88  0.80   GLU  171*  196*  192*   CA  9.7  9.6  9.4   WBC  8.3  11.3*  16.1*   HGB  10.3*  10.1*  9.8*   HCT  31.4*  32.6*  30.9*   PLT  259  270  256     No results found for: CHOL, CHOLX, CHLST, CHOLV, HDL, LDL, LDLC, DLDLP, TGLX, TRIGL, TRIGP, CHHD, CHHDX  No results for input(s): SGOT, GPT, AP, TBIL, TP, ALB, GLOB, GGT, AML, LPSE in the last 72 hours. No lab exists for component: AMYP, HLPSE  No results for input(s): INR, PTP, APTT in the last 72 hours.     No lab exists for component: INREXT   No components found for: GLPOC    Current Facility-Administered Medications   Medication Dose Route Frequency    predniSONE (DELTASONE) tablet 20 mg  20 mg Oral BID WITH MEALS    levoFLOXacin (LEVAQUIN) tablet 750 mg  750 mg Oral Q24H    dronedarone (MULTAQ) tablet tab 400 mg  400 mg Oral BID WITH MEALS    metoprolol succinate (TOPROL-XL) XL tablet 50 mg  50 mg Oral DAILY    albuterol-ipratropium (DUO-NEB) 2.5 MG-0.5 MG/3 ML  3 mL Nebulization Q6H RT    vancomycin (VANCOCIN) 750 mg in 0.9% sodium chloride (MBP/ADV) 250 mL  750 mg IntraVENous Q18H    sodium chloride (NS) flush 5-10 mL  5-10 mL IntraVENous PRN    aztreonam 1 g in 0.9% sodium chloride 100 mL IVPB   IntraVENous Q8H    albuterol-ipratropium (DUO-NEB) 2.5 MG-0.5 MG/3 ML  3 mL Nebulization Q4H PRN    sodium chloride (NS) flush 5-10 mL  5-10 mL IntraVENous Q8H    sodium chloride (NS) flush 5-10 mL  5-10 mL IntraVENous PRN    acetaminophen (TYLENOL) tablet 650 mg  650 mg Oral Q4H PRN    bisacodyl (DULCOLAX) suppository 10 mg  10 mg Rectal DAILY PRN    enoxaparin (LOVENOX) injection 40 mg  40 mg SubCUTAneous Q24H    aspirin (ASPIRIN) tablet 325 mg  325 mg Oral DAILY    fluticasone-vilanterol (BREO ELLIPTA) 100mcg-25mcg/puff  1 Puff Inhalation DAILY    hydroCHLOROthiazide (HYDRODIURIL) tablet 25 mg  25 mg Oral DAILY    hydrALAZINE (APRESOLINE) 20 mg/mL injection 10 mg  10 mg IntraVENous Q6H PRN        Shy Bhagat MD

## 2022-04-20 NOTE — TELEPHONE ENCOUNTER
Gato Rodgers would benefit from continuous glucose monitoring.  Please sign pended order for Yovanny 2 if you agree with plan.     [facility]

## 2022-04-22 NOTE — TELEPHONE ENCOUNTER
PRIOR AUTHORIZATION DENIED    Medication: Freestyle Yovanny 2 PA Denied    Denial Date: 4/21/2022    Denial Rational:     Appeal Information:          [FreeTextEntry1] : review of system normal other than stated in the history of present illness

## 2022-06-04 ENCOUNTER — HEALTH MAINTENANCE LETTER (OUTPATIENT)
Age: 57
End: 2022-06-04

## 2022-06-07 NOTE — PROGRESS NOTES
"  Assessment & Plan       ICD-10-CM    1. Uncontrolled type 2 diabetes mellitus with hyperglycemia (H)  E11.65    2. Type 2 diabetes mellitus with microalbuminuria, with long-term current use of insulin (H)  E11.29 OPTOMETRY REFERRAL    R80.9 HEMOGLOBIN A1C    Z79.4 BASIC METABOLIC PANEL     Adult Endocrinology  Referral     rosuvastatin (CRESTOR) 10 MG tablet     Continuous Blood Gluc  (FREESTYLE YOVANNY 14 DAY READER) [place]     Continuous Blood Gluc Sensor (FREESTYLE YOVANNY 14 DAY SENSOR) MISC   3. Essential hypertension  I10 lisinopril (ZESTRIL) 20 MG tablet   4. Chronic kidney disease, stage 1  N18.1    5. Leg cramping  R25.2 Magnesium   6. Pneumococcal vaccination indicated  Z91.89 Pneumococcal 20 Valent Conjugate (Prevnar 20)   7. High priority for 2019-nCoV vaccine  Z23        1,2) Restart insulin and titrate to 30 units nightly. Will trial Crestor 10mg instead. New prescription sent through for the Yovanny. Referral to endocrinology. Follow up in 3 months, will recheck A1c, lipids, and LFTs at that time as well.    3) Increase lisinipril to 20mg daily. Recheck blood pressure in 3 weeks, ancillary ok.    4) Co morbidity    5) Likely related to dehydration in relations to his excessive urination due to his uncontrolled diabetes. Will check electrolytes and magnesium today      Ordering of each unique test  Prescription drug management         BMI:   Estimated body mass index is 34.7 kg/m  as calculated from the following:    Height as of this encounter: 1.758 m (5' 9.21\").    Weight as of this encounter: 107.2 kg (236 lb 6.4 oz).     Return in about 3 weeks (around 6/29/2022) for a BP check with a medical assistant.    Ana Maria Valentin PA-C  [facility] SANIYA Hoskins is a 57 year old who presents for the following health issues     History of Present Illness       Reason for visit:  Check up and questions on some. Leg cramps.    He eats 0-1 servings of fruits and " vegetables daily.He consumes 1 sweetened beverage(s) daily.He exercises with enough effort to increase his heart rate 30 to 60 minutes per day.  He exercises with enough effort to increase his heart rate 3 or less days per week. He is missing 1 dose(s) of medications per week.  He is not taking prescribed medications regularly due to remembering to take.       Diabetes Follow-up    How often are you checking your blood sugar? One time daily  What time of day are you checking your blood sugars (select all that apply)?  Before meals  Have you had any blood sugars above 200?  Yes several  Have you had any blood sugars below 70?  No    What symptoms do you notice when your blood sugar is low?  None and Not applicable    What concerns do you have today about your diabetes? None and Other: would like to try different medications     Do you have any of these symptoms? (Select all that apply)  Burning in feet and Excessive thirst    Have you had a diabetic eye exam in the last 12 months? No      Jardiance: didn't feel as though it was helping lower sugars  Glipizide: a previous doctor told him this was a bad med  Insulin: off x6-7 months  Actos: forgot to take it  Atorvastatin: s/e of some sort    Leg cramps x 6 months ago, intermittent, when patient is more active (gardening, yard work, etc.) he will have cramping after sitting down      BP Readings from Last 2 Encounters:   06/08/22 (!) 173/95   07/21/21 136/86     Hemoglobin A1C POCT (%)   Date Value   03/29/2021 8.9 (H)   08/11/2020 9.3 (H)     Hemoglobin A1C (%)   Date Value   10/19/2021 9.1 (H)   07/21/2021 9.3 (H)     LDL Cholesterol Calculated (mg/dL)   Date Value   07/21/2021 31   03/29/2021     Cannot estimate LDL when triglyceride exceeds 400 mg/dL   08/17/2019 54     LDL Cholesterol Direct (mg/dL)   Date Value   03/29/2021 56                 Review of Systems   Constitutional, cardiovascular, endocrine systems are negative, except as otherwise noted.     "  Objective    BP (!) 173/95 (BP Location: Left arm, Patient Position: Sitting, Cuff Size: Adult Large)   Pulse 94   Temp 97.5  F (36.4  C) (Tympanic)   Resp 18   Ht 1.758 m (5' 9.21\")   Wt 107.2 kg (236 lb 6.4 oz)   SpO2 94%   BMI 34.70 kg/m    Body mass index is 34.7 kg/m .  Physical Exam   GENERAL: healthy, alert and no distress  MS: no gross musculoskeletal defects noted, no edema  SKIN: no suspicious lesions or rashes  NEURO: Normal strength and tone, mentation intact and speech normal  PSYCH: mentation appears normal, affect normal/bright          "

## 2022-06-08 ENCOUNTER — TELEPHONE (OUTPATIENT)
Dept: FAMILY MEDICINE | Facility: CLINIC | Age: 57
End: 2022-06-08

## 2022-06-08 ENCOUNTER — OFFICE VISIT (OUTPATIENT)
Dept: FAMILY MEDICINE | Facility: CLINIC | Age: 57
End: 2022-06-08
Payer: COMMERCIAL

## 2022-06-08 VITALS
HEIGHT: 69 IN | RESPIRATION RATE: 18 BRPM | SYSTOLIC BLOOD PRESSURE: 150 MMHG | BODY MASS INDEX: 35.01 KG/M2 | HEART RATE: 94 BPM | OXYGEN SATURATION: 94 % | TEMPERATURE: 97.5 F | WEIGHT: 236.4 LBS | DIASTOLIC BLOOD PRESSURE: 74 MMHG

## 2022-06-08 DIAGNOSIS — Z91.89 PNEUMOCOCCAL VACCINATION INDICATED: ICD-10-CM

## 2022-06-08 DIAGNOSIS — R25.2 LEG CRAMPING: ICD-10-CM

## 2022-06-08 DIAGNOSIS — N18.1 CHRONIC KIDNEY DISEASE, STAGE 1: ICD-10-CM

## 2022-06-08 DIAGNOSIS — Z23 HIGH PRIORITY FOR 2019-NCOV VACCINE: ICD-10-CM

## 2022-06-08 DIAGNOSIS — R80.9 TYPE 2 DIABETES MELLITUS WITH MICROALBUMINURIA, WITH LONG-TERM CURRENT USE OF INSULIN (H): ICD-10-CM

## 2022-06-08 DIAGNOSIS — I10 ESSENTIAL HYPERTENSION: ICD-10-CM

## 2022-06-08 DIAGNOSIS — Z79.4 TYPE 2 DIABETES MELLITUS WITH MICROALBUMINURIA, WITH LONG-TERM CURRENT USE OF INSULIN (H): ICD-10-CM

## 2022-06-08 DIAGNOSIS — E11.65 UNCONTROLLED TYPE 2 DIABETES MELLITUS WITH HYPERGLYCEMIA (H): Primary | ICD-10-CM

## 2022-06-08 DIAGNOSIS — E11.29 TYPE 2 DIABETES MELLITUS WITH MICROALBUMINURIA, WITH LONG-TERM CURRENT USE OF INSULIN (H): ICD-10-CM

## 2022-06-08 PROBLEM — E66.01 MORBID OBESITY (H): Status: RESOLVED | Noted: 2021-07-21 | Resolved: 2022-06-08

## 2022-06-08 LAB
ANION GAP SERPL CALCULATED.3IONS-SCNC: 5 MMOL/L (ref 3–14)
BUN SERPL-MCNC: 16 MG/DL (ref 7–30)
CALCIUM SERPL-MCNC: 9.3 MG/DL (ref 8.5–10.1)
CHLORIDE BLD-SCNC: 105 MMOL/L (ref 94–109)
CO2 SERPL-SCNC: 28 MMOL/L (ref 20–32)
CREAT SERPL-MCNC: 0.66 MG/DL (ref 0.66–1.25)
GFR SERPL CREATININE-BSD FRML MDRD: >90 ML/MIN/1.73M2
GLUCOSE BLD-MCNC: 462 MG/DL (ref 70–99)
HBA1C MFR BLD: 11.8 % (ref 0–5.6)
MAGNESIUM SERPL-MCNC: 2 MG/DL (ref 1.6–2.3)
POTASSIUM BLD-SCNC: 4.4 MMOL/L (ref 3.4–5.3)
SODIUM SERPL-SCNC: 138 MMOL/L (ref 133–144)

## 2022-06-08 PROCEDURE — 83735 ASSAY OF MAGNESIUM: CPT | Performed by: PHYSICIAN ASSISTANT

## 2022-06-08 PROCEDURE — 0054A COVID-19,PF,PFIZER (12+ YRS): CPT | Performed by: PHYSICIAN ASSISTANT

## 2022-06-08 PROCEDURE — 80048 BASIC METABOLIC PNL TOTAL CA: CPT | Performed by: PHYSICIAN ASSISTANT

## 2022-06-08 PROCEDURE — 36415 COLL VENOUS BLD VENIPUNCTURE: CPT | Performed by: PHYSICIAN ASSISTANT

## 2022-06-08 PROCEDURE — 83036 HEMOGLOBIN GLYCOSYLATED A1C: CPT | Performed by: PHYSICIAN ASSISTANT

## 2022-06-08 PROCEDURE — 99214 OFFICE O/P EST MOD 30 MIN: CPT | Mod: 25 | Performed by: PHYSICIAN ASSISTANT

## 2022-06-08 PROCEDURE — 91305 COVID-19,PF,PFIZER (12+ YRS): CPT | Performed by: PHYSICIAN ASSISTANT

## 2022-06-08 PROCEDURE — 90471 IMMUNIZATION ADMIN: CPT | Performed by: PHYSICIAN ASSISTANT

## 2022-06-08 PROCEDURE — 90677 PCV20 VACCINE IM: CPT | Performed by: PHYSICIAN ASSISTANT

## 2022-06-08 RX ORDER — LISINOPRIL 20 MG/1
20 TABLET ORAL DAILY
Qty: 90 TABLET | Refills: 0 | Status: SHIPPED | OUTPATIENT
Start: 2022-06-08 | End: 2023-02-23

## 2022-06-08 RX ORDER — ROSUVASTATIN CALCIUM 10 MG/1
10 TABLET, COATED ORAL DAILY
Qty: 90 TABLET | Refills: 0 | Status: SHIPPED | OUTPATIENT
Start: 2022-06-08 | End: 2024-03-01

## 2022-06-08 RX ORDER — FLASH GLUCOSE SENSOR
1 KIT MISCELLANEOUS
Qty: 2 EACH | Refills: 5 | Status: SHIPPED | OUTPATIENT
Start: 2022-06-08 | End: 2022-10-15

## 2022-06-08 RX ORDER — FLASH GLUCOSE SCANNING READER
1 EACH MISCELLANEOUS ONCE
Qty: 1 EACH | Refills: 0 | Status: SHIPPED | OUTPATIENT
Start: 2022-06-08 | End: 2022-06-08

## 2022-06-08 ASSESSMENT — PAIN SCALES - GENERAL: PAINLEVEL: MILD PAIN (2)

## 2022-06-08 NOTE — PATIENT INSTRUCTIONS
Start your insulin back up, 15 units daily.  Increase by 5 units every 4 days until you get up to 30 units daily.  The goal will be a fasting sugar <130 OR a sugar 2 hours after a meal <180    Increase your lisinopril to 20mg daily.  We'll recheck a blood pressure in 3 weeks.    Let's try rosuvastatin for cholesterol/heart attack prevention.    Schedule a consult/new appt with endocrinology.  Follow up with Ana Maria in 3 months for diabetes and BP follow up and fasting labs.

## 2022-06-08 NOTE — NURSING NOTE
Prior to immunization administration, verified patients identity using patient s name and date of birth. Please see Immunization Activity for additional information.     Screening Questionnaire for Adult Immunization    Are you sick today?   No   Do you have allergies to medications, food, a vaccine component or latex?   No   Have you ever had a serious reaction after receiving a vaccination?   No   Do you have a long-term health problem with heart, lung, kidney, or metabolic disease (e.g., diabetes), asthma, a blood disorder, no spleen, complement component deficiency, a cochlear implant, or a spinal fluid leak?  Are you on long-term aspirin therapy?   Yes   Do you have cancer, leukemia, HIV/AIDS, or any other immune system problem?   No   Do you have a parent, brother, or sister with an immune system problem?   No   In the past 3 months, have you taken medications that affect  your immune system, such as prednisone, other steroids, or anticancer drugs; drugs for the treatment of rheumatoid arthritis, Crohn s disease, or psoriasis; or have you had radiation treatments?   No   Have you had a seizure, or a brain or other nervous system problem?   No   During the past year, have you received a transfusion of blood or blood    products, or been given immune (gamma) globulin or antiviral drug?   No   For women: Are you pregnant or is there a chance you could become       pregnant during the next month?   No   Have you received any vaccinations in the past 4 weeks?   No     Immunization questionnaire was positive for at least one answer.  Notified Ana Maria Valentin PA-C.        Per orders of Ana Maria Valentin PA-C, injection of [id number] and Pfizer given by Dalila Flanagan CMA. Patient instructed to remain in clinic for 15 minutes afterwards, and to report any adverse reaction to me immediately.       Screening performed by Dalila Flanagan CMA on 6/8/2022 at 11:14 AM.

## 2022-06-09 NOTE — TELEPHONE ENCOUNTER
Patient returned call.  Patient stated he feel's great, no symptoms of high glucose.  Patient went on to say that he had just eaten two sausage egg mc muffins and hash browns prior to appointment so was not surprise that glucose was high.   Patient stated he did start insulin this morning.  Patient checked his blood sugar this am and it was around 200 which per him is his normal.  Patient went on to say that he feels better when BS is on the higher end.  Advised patient to continue to monitor his BS and to call back with high readings.  Patient stated he also has Livongo through [employer] which helps monitor his sugar and they call him if sugars are not within recommended range.  Went on to educate patient about not having sugars controlled and the damage it can cause on his other organs.  Patient verbalized understanding.    Looks like provider Ana Maria Valentin has also been my charting with patient regarding high A1C.  Will send this message to Ana Maria Valentin who had appointment with patient yesterday.

## 2022-06-09 NOTE — TELEPHONE ENCOUNTER
Left message on voicemail advising patient to return call at [phone]. When patient returns call, please see message by Dr. Espinosa below.    Routing to [facility] RN team to please watch for call back and follow up with patient. Thank you!    Sharron TRAN, RN  [facility] [place]

## 2022-06-09 NOTE — TELEPHONE ENCOUNTER
On call physician notified of glucose level of 462. Called patient to assess and left VM.  Nursing team, please contact patient to assess glucose control after starting insulin.  Wiliam Espinosa MD

## 2022-06-29 ENCOUNTER — ALLIED HEALTH/NURSE VISIT (OUTPATIENT)
Dept: FAMILY MEDICINE | Facility: CLINIC | Age: 57
End: 2022-06-29
Payer: COMMERCIAL

## 2022-06-29 VITALS — DIASTOLIC BLOOD PRESSURE: 70 MMHG | SYSTOLIC BLOOD PRESSURE: 120 MMHG

## 2022-06-29 DIAGNOSIS — I10 ESSENTIAL HYPERTENSION: Primary | ICD-10-CM

## 2022-06-29 PROCEDURE — 99207 PR NO CHARGE NURSE ONLY: CPT

## 2022-06-29 NOTE — PROGRESS NOTES
Miquel Llanes is a 57 year old patient who comes in today for a Blood Pressure check.  Initial BP:  120/70  Data Unavailable  Disposition: results routed to provider Ana Maria Long [facility]

## 2022-07-11 DIAGNOSIS — E11.65 TYPE 2 DIABETES MELLITUS WITH HYPERGLYCEMIA, WITH LONG-TERM CURRENT USE OF INSULIN (H): ICD-10-CM

## 2022-07-11 DIAGNOSIS — Z79.4 TYPE 2 DIABETES MELLITUS WITH HYPERGLYCEMIA, WITH LONG-TERM CURRENT USE OF INSULIN (H): ICD-10-CM

## 2022-07-11 RX ORDER — EMPAGLIFLOZIN 25 MG/1
TABLET, FILM COATED ORAL
Qty: 30 TABLET | Refills: 11 | OUTPATIENT
Start: 2022-07-11

## 2022-07-12 NOTE — TELEPHONE ENCOUNTER
[facility]. Message sent to requesting pharmacy.

## 2022-08-08 ENCOUNTER — OFFICE VISIT (OUTPATIENT)
Dept: OPHTHALMOLOGY | Facility: CLINIC | Age: 57
End: 2022-08-08
Payer: COMMERCIAL

## 2022-08-08 DIAGNOSIS — E11.3299 BACKGROUND DIABETIC RETINOPATHY (H): ICD-10-CM

## 2022-08-08 DIAGNOSIS — Z01.01 ENCOUNTER FOR EXAMINATION OF EYES AND VISION WITH ABNORMAL FINDINGS: ICD-10-CM

## 2022-08-08 DIAGNOSIS — Z79.4 TYPE 2 DIABETES MELLITUS WITH MICROALBUMINURIA, WITH LONG-TERM CURRENT USE OF INSULIN (H): Primary | ICD-10-CM

## 2022-08-08 DIAGNOSIS — E11.29 TYPE 2 DIABETES MELLITUS WITH MICROALBUMINURIA, WITH LONG-TERM CURRENT USE OF INSULIN (H): Primary | ICD-10-CM

## 2022-08-08 DIAGNOSIS — H52.4 PRESBYOPIA: ICD-10-CM

## 2022-08-08 DIAGNOSIS — R80.9 TYPE 2 DIABETES MELLITUS WITH MICROALBUMINURIA, WITH LONG-TERM CURRENT USE OF INSULIN (H): Primary | ICD-10-CM

## 2022-08-08 PROCEDURE — 92004 COMPRE OPH EXAM NEW PT 1/>: CPT | Performed by: OPHTHALMOLOGY

## 2022-08-08 PROCEDURE — 92015 DETERMINE REFRACTIVE STATE: CPT | Performed by: OPHTHALMOLOGY

## 2022-08-08 ASSESSMENT — REFRACTION_MANIFEST
OS_SPHERE: -0.50
OS_CYLINDER: +0.50
OD_SPHERE: -0.25
OD_AXIS: 005
OD_CYLINDER: +0.50
OD_ADD: +2.00
OS_AXIS: 015
OS_ADD: +2.00

## 2022-08-08 ASSESSMENT — VISUAL ACUITY
OD_SC+: -1
METHOD: SNELLEN - LINEAR
OS_SC+: -1
OD_SC: 20/20
OS_SC: 20/25

## 2022-08-08 ASSESSMENT — TONOMETRY
OS_IOP_MMHG: 15
IOP_METHOD: APPLANATION
OD_IOP_MMHG: 15

## 2022-08-08 ASSESSMENT — REFRACTION_WEARINGRX
OD_CYLINDER: SPHERE
OS_SPHERE: +1.25
OS_CYLINDER: SPHERE
SPECS_TYPE: OTC READERS
OD_SPHERE: +1.25

## 2022-08-08 ASSESSMENT — CUP TO DISC RATIO
OS_RATIO: 0.1
OD_RATIO: 0.3

## 2022-08-08 ASSESSMENT — EXTERNAL EXAM - LEFT EYE: OS_EXAM: NORMAL

## 2022-08-08 ASSESSMENT — CONF VISUAL FIELD
OS_NORMAL: 1
OD_NORMAL: 1

## 2022-08-08 ASSESSMENT — SLIT LAMP EXAM - LIDS
COMMENTS: NORMAL
COMMENTS: NORMAL

## 2022-08-08 ASSESSMENT — EXTERNAL EXAM - RIGHT EYE: OD_EXAM: NORMAL

## 2022-08-08 NOTE — PATIENT INSTRUCTIONS
"Glasses prescription given - optional  Okay to use OTC up +2.00 half readers.  Use artificial tears up to four times a day (like Refresh Optive, Systane Balance, TheraTears, or generic artificial tears are ok. Avoid \"get the red out\" drops).   Call in April 2023 for an appointment in August 2023 for Complete Exam    Dr. Ty [phone]     Patient Education   Diabetes weakens the blood vessels all over the body, including the eyes. Damage to the blood vessels in the eyes can cause swelling or bleeding into part of the eye (called the retina). This is called diabetic retinopathy (KAILASH-tin-AH-puh-thee). If not treated, this disease can cause vision loss or blindness.   Symptoms may include blurred or distorted vision, but many people have no symptoms. It's important to see your eye doctor regularly to check for problems.   Early treatment and good control can help protect your vision. Here are the things you can do to help prevent vision loss:      1. Keep your blood sugar levels under tight control.      2. Bring high blood pressure under control.      3. No smoking.      4. Have yearly dilated eye exams.       "

## 2022-08-08 NOTE — PROGRESS NOTES
" Current Eye Medications: none     Subjective:  Here for complete eye exam today, referred by Dr. Valentin. It has been two years since last exam. Has computer glasses and readers from the [facility]. 152/94 blood pressure at home. Blood sugars are 150-200 before breakfast     Lab Results   Component Value Date    A1C 11.8 06/08/2022    A1C 9.1 10/19/2021    A1C 9.3 07/21/2021    A1C 8.9 03/29/2021    A1C 9.3 08/11/2020        Hemoglobin A1C   Date Value Ref Range Status   06/08/2022 11.8 (H) 0.0 - 5.6 % Final     Comment:     Repeated 11.8   03/29/2021 8.9 (H) 0 - 5.6 % Final     Comment:     Normal <5.7% Prediabetes 5.7-6.4%  Diabetes 6.5% or higher - adopted from ADA   consensus guidelines.       DM x 10-15 yrs.     Objective:  See Ophthalmology Exam.       Assessment:  Baseline eye exam in patient with diabetes.  Mild background diabetic retinopathy both eyes.      ICD-10-CM    1. Type 2 diabetes mellitus with microalbuminuria, with long-term current use of insulin (H)  E11.29 OPTOMETRY REFERRAL    R80.9 EYE EXAM (SIMPLE-NONBILLABLE)    Z79.4       2. Background diabetic retinopathy, mild, ou  E11.3299       3. Encounter for examination of eyes and vision with abnormal findings  Z01.01       4. Presbyopia  H52.4 REFRACTIVE STATUS     EYE EXAM (SIMPLE-NONBILLABLE)           Plan:  Glasses prescription given - optional  Okay to use OTC up +2.00 half readers.  Use artificial tears up to four times a day (like Refresh Optive, Systane Balance, TheraTears, or generic artificial tears are ok. Avoid \"get the red out\" drops).   Call in April 2023 for an appointment in August 2023 for Complete Exam    Dr. Ty [phone]     "

## 2022-08-08 NOTE — LETTER
"    8/8/2022         RE: Miquel Llanes  [address] [address]        Dear Colleague,    Thank you for referring your patient, Miquel Llanes, to the [facility]. Please see a copy of my visit note below.     Current Eye Medications: none     Subjective:  Here for complete eye exam today, referred by Dr. Valentin. It has been two years since last exam. Has computer glasses and readers from the [facility]. 152/94 blood pressure at home. Blood sugars are 150-200 before breakfast     Lab Results   Component Value Date    A1C 11.8 06/08/2022    A1C 9.1 10/19/2021    A1C 9.3 07/21/2021    A1C 8.9 03/29/2021    A1C 9.3 08/11/2020        Hemoglobin A1C   Date Value Ref Range Status   06/08/2022 11.8 (H) 0.0 - 5.6 % Final     Comment:     Repeated 11.8   03/29/2021 8.9 (H) 0 - 5.6 % Final     Comment:     Normal <5.7% Prediabetes 5.7-6.4%  Diabetes 6.5% or higher - adopted from ADA   consensus guidelines.       DM x 10-15 yrs.     Objective:  See Ophthalmology Exam.       Assessment:  Baseline eye exam in patient with diabetes.  Mild background diabetic retinopathy both eyes.      ICD-10-CM    1. Type 2 diabetes mellitus with microalbuminuria, with long-term current use of insulin (H)  E11.29 OPTOMETRY REFERRAL    R80.9 EYE EXAM (SIMPLE-NONBILLABLE)    Z79.4       2. Background diabetic retinopathy, mild, ou  E11.3299       3. Encounter for examination of eyes and vision with abnormal findings  Z01.01       4. Presbyopia  H52.4 REFRACTIVE STATUS     EYE EXAM (SIMPLE-NONBILLABLE)           Plan:  Glasses prescription given - optional  Okay to use OTC up +2.00 half readers.  Use artificial tears up to four times a day (like Refresh Optive, Systane Balance, TheraTears, or generic artificial tears are ok. Avoid \"get the red out\" drops).   Call in April 2023 for an appointment in August 2023 for Complete Exam    Dr. Ty [phone]         Again, thank you for allowing me to " participate in the care of your patient.        Sincerely,        Wu Ty MD

## 2022-10-09 ENCOUNTER — HEALTH MAINTENANCE LETTER (OUTPATIENT)
Age: 57
End: 2022-10-09

## 2022-10-15 PROBLEM — E11.3299 BACKGROUND DIABETIC RETINOPATHY (H): Status: ACTIVE | Noted: 2022-10-15

## 2022-11-22 ENCOUNTER — TELEPHONE (OUTPATIENT)
Dept: ENDOCRINOLOGY | Facility: CLINIC | Age: 57
End: 2022-11-22

## 2022-11-22 ENCOUNTER — VIRTUAL VISIT (OUTPATIENT)
Dept: ENDOCRINOLOGY | Facility: CLINIC | Age: 57
End: 2022-11-22
Attending: PHYSICIAN ASSISTANT
Payer: COMMERCIAL

## 2022-11-22 DIAGNOSIS — Z79.4 TYPE 2 DIABETES MELLITUS WITH HYPERGLYCEMIA, WITH LONG-TERM CURRENT USE OF INSULIN (H): ICD-10-CM

## 2022-11-22 DIAGNOSIS — E78.5 DYSLIPIDEMIA: ICD-10-CM

## 2022-11-22 DIAGNOSIS — Z79.4 TYPE 2 DIABETES MELLITUS WITH MICROALBUMINURIA, WITH LONG-TERM CURRENT USE OF INSULIN (H): Primary | ICD-10-CM

## 2022-11-22 DIAGNOSIS — R80.9 TYPE 2 DIABETES MELLITUS WITH MICROALBUMINURIA, WITH LONG-TERM CURRENT USE OF INSULIN (H): Primary | ICD-10-CM

## 2022-11-22 DIAGNOSIS — E11.65 TYPE 2 DIABETES MELLITUS WITH HYPERGLYCEMIA, WITH LONG-TERM CURRENT USE OF INSULIN (H): ICD-10-CM

## 2022-11-22 DIAGNOSIS — I10 ESSENTIAL HYPERTENSION: ICD-10-CM

## 2022-11-22 DIAGNOSIS — E11.29 TYPE 2 DIABETES MELLITUS WITH MICROALBUMINURIA, WITH LONG-TERM CURRENT USE OF INSULIN (H): Primary | ICD-10-CM

## 2022-11-22 DIAGNOSIS — R80.9 MICROALBUMINURIA: ICD-10-CM

## 2022-11-22 PROCEDURE — 99215 OFFICE O/P EST HI 40 MIN: CPT | Mod: GT | Performed by: INTERNAL MEDICINE

## 2022-11-22 RX ORDER — LISINOPRIL 20 MG/1
20 TABLET ORAL DAILY
Qty: 90 TABLET | Refills: 0 | Status: CANCELLED | OUTPATIENT
Start: 2022-11-22

## 2022-11-22 RX ORDER — SEMAGLUTIDE 1.34 MG/ML
INJECTION, SOLUTION SUBCUTANEOUS
Qty: 3 ML | Refills: 0 | Status: SHIPPED | OUTPATIENT
Start: 2022-11-22 | End: 2022-12-05 | Stop reason: ALTCHOICE

## 2022-11-22 RX ORDER — METFORMIN HCL 500 MG
TABLET, EXTENDED RELEASE 24 HR ORAL
Qty: 395 TABLET | Refills: 1 | Status: SHIPPED | OUTPATIENT
Start: 2022-11-22 | End: 2023-02-23

## 2022-11-22 RX ORDER — ROSUVASTATIN CALCIUM 10 MG/1
10 TABLET, COATED ORAL DAILY
Qty: 90 TABLET | Refills: 0 | Status: CANCELLED | OUTPATIENT
Start: 2022-11-22

## 2022-11-22 NOTE — LETTER
11/22/2022         RE: Miquel Llanes  [address] [facility] [address]        Dear Colleague,    Thank you for referring your patient, Miquel Llanes, to the [facility]. Please see a copy of my visit note below.    Gato is a 57 year old who is being evaluated via a billable video visit.      How would you like to obtain your AVS? Noteleaf  If the video visit is dropped, the invitation should be resent by: Text to cell phone: [phone]  Will anyone else be joining your video visit? No    Outcome for 11/22/22 8:25 AM: Patient will send the last 14 days of blood sugar prior to appointment via Marketfish    Alessia Lezama [facility]  Adult Endocrinology  [facility]        Endocrinology Clinic Visit    Chief Complaint: New Patient and Consult (Diabetes, discuss medications and high blood sugars)     Information obtained from:Patient      Assessment/Treatment Plan:      Uncontrolled type 2 diabetes complicated with microalbuminuria: Currently on Jardiance and insulin glargine 30 units daily.  Blood sugar readings and A1c not controlled optimally.  Jardiance has been causing a lot of symptoms including frequent urination and cost has been prohibitive therefore discontinued this medication temporarily today.  We will increase the insulin, add metformin at the full dose 2000 mg daily, add Ozempic if covered or other similar GLP-1 agonist medication [if this is not covered consider glipizide or sulfonylurea].  C-peptide has been checked previously and was within the normal limit.  Héctor antibody negative.    Plan      Insulin glargine 40 units daily    Metformin 500 mg extended release first week then increase the dose to 500 mg twice daily then increase to 2000 mg daily    Ozempic 0.25 mg first 4 weeks and then increase to 0.5 mg every 7 days.    Hypertension and microalbuminemia-continue lisinopril 20 mg daily.  Please check follow-up albumin.    Hyperlipidemia-continue  rosuvastatin at the same dose.  Check fasting cholesterol panel.      Return to clinic in 3 months.    Test and/or medications prescribed today:  Orders Placed This Encounter   Procedures     Basic metabolic panel     Hemoglobin A1c     Albumin Random Urine Quantitative with Creat Ratio     Lipid panel reflex to direct LDL Fasting         Kimmy Morin MD  Staff Endocrinologist    Division of Endocrinology and Diabetes      Subjective:         HPI: Miquel Llanes is a 57 year old male with history of type 2 diabetes who is here to establish care.  Used to follow-up with Dr. Butts.      Type 2 diabetes diagnosed 15 years ago.   Insulin glargine 30  Jardiance  25   Bydureon expensive - helped while he was on it.   Wants to understand his body is currently producing insulin or not.  Wants to understand the mechanism of his current diabetes and how best it is treated.  Current blood sugar readings as documented below.   11/19 248  11/18 207  11/17 207  11/16 294  11/15 235  11/14 310  11/13 213  11/12 242  11/11 259  11/10 248  11/09 248  11/08 296    Answers for HPI/ROS submitted by the patient on 11/22/2022  General Symptoms: No  Skin Symptoms: No  HENT Symptoms: No  EYE SYMPTOMS: No  HEART SYMPTOMS: No  LUNG SYMPTOMS: No  INTESTINAL SYMPTOMS: No  URINARY SYMPTOMS: No  REPRODUCTIVE SYMPTOMS: No  SKELETAL SYMPTOMS: No  BLOOD SYMPTOMS: No  NERVOUS SYSTEM SYMPTOMS: No  MENTAL HEALTH SYMPTOMS: No      Allergies   Allergen Reactions     Atorvastatin Rash       Current Outpatient Medications   Medication Sig Dispense Refill     insulin glargine (LANTUS PEN) 100 UNIT/ML pen Inject 40 Units Subcutaneous At Bedtime 45 mL 1     lisinopril (ZESTRIL) 20 MG tablet Take 1 tablet (20 mg) by mouth daily - dose increase 6/8/22 90 tablet 0     metFORMIN (GLUCOPHAGE XR) 500 MG 24 hr tablet Take 1 tablet (500 mg) by mouth daily (with dinner) for 7 days, THEN 2 tablets (1,000 mg) daily (with dinner) for 14 days, THEN 4 tablets  (2,000 mg) daily (with dinner) for 90 days. 395 tablet 1     rosuvastatin (CRESTOR) 10 MG tablet Take 1 tablet (10 mg) by mouth daily - stop atorvastatin 6/8/22 90 tablet 0     semaglutide (OZEMPIC, 0.25 OR 0.5 MG/DOSE,) 2 MG/1.5ML SOPN pen Inject 0.25 mg Subcutaneous every 7 days for 28 days, THEN 0.5 mg every 7 days for 28 days. 3 mL 0       Review of Systems     11 point review system (Constitutional, HENT, Eyes, Respiratory, Cardiovascular, Gastrointestinal, Genitourinary, Musculoskeletal,Neurological, Psychiatric/Behavioural, Endocrine) is negative or is as per HPI above  Pertinent past medical history, past surgical history, social and family history reviewed.      Objective:   There were no vitals taken for this visit.  GENERAL: alert and no distress  EYES: Eyes grossly normal to inspection.  No discharge or erythema, or obvious scleral/conjunctival abnormalities.  RESP: No audible wheeze, cough, or visible cyanosis.    PSYCH: Mentation appears normal, affect normal/bright, judgement and insight intact, normal speech and appearance well-groomed.    In House Labs:   Lab Results   Component Value Date    A1C 11.8 06/08/2022    A1C 9.1 10/19/2021    A1C 9.3 07/21/2021    A1C 8.9 03/29/2021    A1C 9.3 08/11/2020       TSH   Date Value Ref Range Status   05/04/2018 0.67 0.36 - 3.74 UIU/mL Final       Creatinine   Date Value Ref Range Status   06/08/2022 0.66 0.66 - 1.25 mg/dL Final   03/29/2021 0.71 0.66 - 1.25 mg/dL Final   ]  Hemoglobin A1C   Date Value Ref Range Status   06/08/2022 11.8 (H) 0.0 - 5.6 % Final     Comment:     Repeated 11.8   10/19/2021 9.1 (H) 0.0 - 5.6 % Final     Comment:     Normal <5.7%   Prediabetes 5.7-6.4%    Diabetes 6.5% or higher     Note: Adopted from ADA consensus guidelines.   07/21/2021 9.3 (H) 0.0 - 5.6 % Final     Comment:     Normal <5.7%   Prediabetes 5.7-6.4%    Diabetes 6.5% or higher     Note: Adopted from ADA consensus guidelines.   03/29/2021 8.9 (H) 0 - 5.6 % Final      Comment:     Normal <5.7% Prediabetes 5.7-6.4%  Diabetes 6.5% or higher - adopted from ADA   consensus guidelines.     08/11/2020 9.3 (H) 0 - 5.6 % Final     Comment:     Results confirmed by repeat test  Normal <5.7% Prediabetes 5.7-6.4%  Diabetes 6.5% or higher - adopted from ADA   consensus guidelines.       Recent Labs   Lab Test 07/21/21  0815 03/29/21  1614   CHOL 89 106   HDL 34* 24*   LDL 31 Cannot estimate LDL when triglyceride exceeds 400 mg/dL  56   TRIG 120 588*       This note has been dictated using voice recognition software.  As a result, there may be errors in the documentation that have gone undetected.  Please consider this when interpreting information in this documentation.        Video-Visit Details    Type of service:  Video Visit    Video Start Time: 3:02 PM    Video End Time:3:37 PM    Originating Location (pt. Location): Home    Distant Location (provider location):  Off-site.     Platform used for Video Visit: LUVHAN  47 minutes spent on the date of the encounter doing chart review, history and exam, documentation and further activities per the note.         Again, thank you for allowing me to participate in the care of your patient.        Sincerely,        Kimmy Morin MD

## 2022-11-22 NOTE — PROGRESS NOTES
Endocrinology Clinic Visit    Chief Complaint: New Patient and Consult (Diabetes, discuss medications and high blood sugars)     Information obtained from:Patient      Assessment/Treatment Plan:      Uncontrolled type 2 diabetes complicated with microalbuminuria: Currently on Jardiance and insulin glargine 30 units daily.  Blood sugar readings and A1c not controlled optimally.  Jardiance has been causing a lot of symptoms including frequent urination and cost has been prohibitive therefore discontinued this medication temporarily today.  We will increase the insulin, add metformin at the full dose 2000 mg daily, add Ozempic if covered or other similar GLP-1 agonist medication [if this is not covered consider glipizide or sulfonylurea].  C-peptide has been checked previously and was within the normal limit.  Héctor antibody negative.    Plan      Insulin glargine 40 units daily    Metformin 500 mg extended release first week then increase the dose to 500 mg twice daily then increase to 2000 mg daily    Ozempic 0.25 mg first 4 weeks and then increase to 0.5 mg every 7 days.    Hypertension and microalbuminemia-continue lisinopril 20 mg daily.  Please check follow-up albumin.    Hyperlipidemia-continue rosuvastatin at the same dose.  Check fasting cholesterol panel.      Return to clinic in 3 months.    Test and/or medications prescribed today:  Orders Placed This Encounter   Procedures     Basic metabolic panel     Hemoglobin A1c     Albumin Random Urine Quantitative with Creat Ratio     Lipid panel reflex to direct LDL Fasting         Kimmy Morin MD  Staff Endocrinologist    Division of Endocrinology and Diabetes      Subjective:         HPI: Miquel Llanes is a 57 year old male with history of type 2 diabetes who is here to establish care.  Used to follow-up with Dr. Butts.      Type 2 diabetes diagnosed 15 years ago.   Insulin glargine 30  Jardiance  25   Bydureon expensive - helped while he was on it.    Wants to understand his body is currently producing insulin or not.  Wants to understand the mechanism of his current diabetes and how best it is treated.  Current blood sugar readings as documented below.   11/19 248  11/18 207  11/17 207  11/16 294  11/15 235  11/14 310  11/13 213  11/12 242  11/11 259  11/10 248  11/09 248  11/08 296    Answers for HPI/ROS submitted by the patient on 11/22/2022  General Symptoms: No  Skin Symptoms: No  HENT Symptoms: No  EYE SYMPTOMS: No  HEART SYMPTOMS: No  LUNG SYMPTOMS: No  INTESTINAL SYMPTOMS: No  URINARY SYMPTOMS: No  REPRODUCTIVE SYMPTOMS: No  SKELETAL SYMPTOMS: No  BLOOD SYMPTOMS: No  NERVOUS SYSTEM SYMPTOMS: No  MENTAL HEALTH SYMPTOMS: No      Allergies   Allergen Reactions     Atorvastatin Rash       Current Outpatient Medications   Medication Sig Dispense Refill     insulin glargine (LANTUS PEN) 100 UNIT/ML pen Inject 40 Units Subcutaneous At Bedtime 45 mL 1     lisinopril (ZESTRIL) 20 MG tablet Take 1 tablet (20 mg) by mouth daily - dose increase 6/8/22 90 tablet 0     metFORMIN (GLUCOPHAGE XR) 500 MG 24 hr tablet Take 1 tablet (500 mg) by mouth daily (with dinner) for 7 days, THEN 2 tablets (1,000 mg) daily (with dinner) for 14 days, THEN 4 tablets (2,000 mg) daily (with dinner) for 90 days. 395 tablet 1     rosuvastatin (CRESTOR) 10 MG tablet Take 1 tablet (10 mg) by mouth daily - stop atorvastatin 6/8/22 90 tablet 0     semaglutide (OZEMPIC, 0.25 OR 0.5 MG/DOSE,) 2 MG/1.5ML SOPN pen Inject 0.25 mg Subcutaneous every 7 days for 28 days, THEN 0.5 mg every 7 days for 28 days. 3 mL 0       Review of Systems     11 point review system (Constitutional, HENT, Eyes, Respiratory, Cardiovascular, Gastrointestinal, Genitourinary, Musculoskeletal,Neurological, Psychiatric/Behavioural, Endocrine) is negative or is as per HPI above  Pertinent past medical history, past surgical history, social and family history reviewed.      Objective:   There were no vitals taken for this  visit.  GENERAL: alert and no distress  EYES: Eyes grossly normal to inspection.  No discharge or erythema, or obvious scleral/conjunctival abnormalities.  RESP: No audible wheeze, cough, or visible cyanosis.    PSYCH: Mentation appears normal, affect normal/bright, judgement and insight intact, normal speech and appearance well-groomed.    In House Labs:   Lab Results   Component Value Date    A1C 11.8 06/08/2022    A1C 9.1 10/19/2021    A1C 9.3 07/21/2021    A1C 8.9 03/29/2021    A1C 9.3 08/11/2020       TSH   Date Value Ref Range Status   05/04/2018 0.67 0.36 - 3.74 UIU/mL Final       Creatinine   Date Value Ref Range Status   06/08/2022 0.66 0.66 - 1.25 mg/dL Final   03/29/2021 0.71 0.66 - 1.25 mg/dL Final   ]  Hemoglobin A1C   Date Value Ref Range Status   06/08/2022 11.8 (H) 0.0 - 5.6 % Final     Comment:     Repeated 11.8   10/19/2021 9.1 (H) 0.0 - 5.6 % Final     Comment:     Normal <5.7%   Prediabetes 5.7-6.4%    Diabetes 6.5% or higher     Note: Adopted from ADA consensus guidelines.   07/21/2021 9.3 (H) 0.0 - 5.6 % Final     Comment:     Normal <5.7%   Prediabetes 5.7-6.4%    Diabetes 6.5% or higher     Note: Adopted from ADA consensus guidelines.   03/29/2021 8.9 (H) 0 - 5.6 % Final     Comment:     Normal <5.7% Prediabetes 5.7-6.4%  Diabetes 6.5% or higher - adopted from ADA   consensus guidelines.     08/11/2020 9.3 (H) 0 - 5.6 % Final     Comment:     Results confirmed by repeat test  Normal <5.7% Prediabetes 5.7-6.4%  Diabetes 6.5% or higher - adopted from ADA   consensus guidelines.       Recent Labs   Lab Test 07/21/21  0815 03/29/21  1614   CHOL 89 106   HDL 34* 24*   LDL 31 Cannot estimate LDL when triglyceride exceeds 400 mg/dL  56   TRIG 120 588*       This note has been dictated using voice recognition software.  As a result, there may be errors in the documentation that have gone undetected.  Please consider this when interpreting information in this documentation.        Video-Visit  Details    Type of service:  Video Visit    Video Start Time: 3:02 PM    Video End Time:3:37 PM    Originating Location (pt. Location): Home    Distant Location (provider location):  Off-site.     Platform used for Video Visit: QBInternational  47 minutes spent on the date of the encounter doing chart review, history and exam, documentation and further activities per the note.

## 2022-11-22 NOTE — TELEPHONE ENCOUNTER
PA Initiation    Medication: Ozempic 2 MG/1.5 ML - PA Initiated   Insurance Company: [employer] ([facility]) - Phone [phone] Fax [phone]  Pharmacy Filling the Rx: [id number] - [place] - [address] AT [facility] & [place]  Filling Pharmacy Phone:    Filling Pharmacy Fax:    Start Date: 11/22/2022

## 2022-11-22 NOTE — PROGRESS NOTES
Gato is a 57 year old who is being evaluated via a billable video visit.      How would you like to obtain your AVS? IntellinX  If the video visit is dropped, the invitation should be resent by: Text to cell phone: [phone]  Will anyone else be joining your video visit? No    Outcome for 11/22/22 8:25 AM: Patient will send the last 14 days of blood sugar prior to appointment via Immunexpress    Alessia Lezama [facility]  Adult Endocrinology  [facility]

## 2022-11-22 NOTE — PATIENT INSTRUCTIONS
[facility]-Department of Endocrinology  Diabetes Educators:   Yuli Cedillo, RN and Kadi Sahu RN  Clinic Nurse: MIKE Rudolph  CMA's: Bailey ALDANAN: Jacquelin  Scheduling/Clinic phone number : [phone]   Clinic Fax: [phone]  On-Call Endocrine at the [place] (after hours/weekends): [phone] option 4    Please call the number below to schedule your labs.    [facility] [phone]   [facility] [phone]   [place] [phone]   [facility]  [phone]   [facility] [phone]   [place] [phone]   [facility] [place], [place]) [phone]   [facility] Walk-In Only   [place] [phone]   [place] [phone]   [place] [phone]   [place] [phone]     Please reach out to the following centers to schedule your imaging appointment:       Imaging (DEXA, CT, MRI, XRAY)    [facility] ([facility], [place]) [phone]   [facility] ([place], [place]) [phone]   [facility] ([facility]) [phone]   [facility] ([facility]) [phone]     Appointment Reminders:  * Please bring meter with for staff to download  * If you are due ONLY for an A1C, it is scheduled with the nurse and will be done in clinic. You do not need to schedule a lab appointment. Fasting is not required for an A1C.  * Refill request should be submitted to your pharmacy. They will contact clinic for approval.

## 2022-11-23 ENCOUNTER — TELEPHONE (OUTPATIENT)
Dept: ENDOCRINOLOGY | Facility: CLINIC | Age: 57
End: 2022-11-23

## 2022-11-23 NOTE — TELEPHONE ENCOUNTER
Left Voicemail (1st Attempt) for the patient to call back and schedule the following:    Appointment type: Return Endo  Provider: Dr. Morin  Return date: 2/22/2023  Specialty phone number: [phone]  Additional appointment(s) needed: none  Additonal Notes:       Please use ADENIKE or put on cancellation list for 4 month follow up.       Also sent a [facility] message.    Mery DOMINGUEZ/Procedure    [facility]   Neurology, NeuroSurgery, NeuroPsychology and Pain Management Specialties  Medical/Surgical Adult Specialties

## 2022-11-23 NOTE — TELEPHONE ENCOUNTER
Prior Authorization Approval    Authorization Effective Date: 11/22/2022  Authorization Expiration Date: 11/22/2023  Medication: Ozempic 2 MG/1.5 ML - PA Approved  Approved Dose/Quantity: 1.5 ml per 28 days  Reference #: [id number]   Insurance Company: [employer] ([facility]) - Phone [phone] Fax [phone]  Expected CoPay:       CoPay Card Available:      Foundation Assistance Needed:    Which Pharmacy is filling the prescription (Not needed for infusion/clinic administered): [id number] - [place], [place] - [address] AT [facility] & [place]  Pharmacy Notified:    Patient Notified:

## 2022-11-25 ENCOUNTER — LAB (OUTPATIENT)
Dept: LAB | Facility: CLINIC | Age: 57
End: 2022-11-25
Payer: COMMERCIAL

## 2022-11-25 DIAGNOSIS — E11.29 TYPE 2 DIABETES MELLITUS WITH MICROALBUMINURIA, WITH LONG-TERM CURRENT USE OF INSULIN (H): ICD-10-CM

## 2022-11-25 DIAGNOSIS — Z79.4 TYPE 2 DIABETES MELLITUS WITH MICROALBUMINURIA, WITH LONG-TERM CURRENT USE OF INSULIN (H): ICD-10-CM

## 2022-11-25 DIAGNOSIS — I10 ESSENTIAL HYPERTENSION: ICD-10-CM

## 2022-11-25 DIAGNOSIS — R80.9 TYPE 2 DIABETES MELLITUS WITH MICROALBUMINURIA, WITH LONG-TERM CURRENT USE OF INSULIN (H): ICD-10-CM

## 2022-11-25 LAB
ANION GAP SERPL CALCULATED.3IONS-SCNC: 7 MMOL/L (ref 3–14)
BUN SERPL-MCNC: 10 MG/DL (ref 7–30)
CALCIUM SERPL-MCNC: 8.8 MG/DL (ref 8.5–10.1)
CHLORIDE BLD-SCNC: 106 MMOL/L (ref 94–109)
CHOLEST SERPL-MCNC: 104 MG/DL
CO2 SERPL-SCNC: 27 MMOL/L (ref 20–32)
CREAT SERPL-MCNC: 0.85 MG/DL (ref 0.66–1.25)
CREAT UR-MCNC: 92 MG/DL
FASTING STATUS PATIENT QL REPORTED: NO
GFR SERPL CREATININE-BSD FRML MDRD: >90 ML/MIN/1.73M2
GLUCOSE BLD-MCNC: 311 MG/DL (ref 70–99)
HDLC SERPL-MCNC: 35 MG/DL
LDLC SERPL CALC-MCNC: 40 MG/DL
MICROALBUMIN UR-MCNC: 107 MG/L
MICROALBUMIN/CREAT UR: 116.3 MG/G CR (ref 0–17)
NONHDLC SERPL-MCNC: 69 MG/DL
POTASSIUM BLD-SCNC: 4 MMOL/L (ref 3.4–5.3)
SODIUM SERPL-SCNC: 140 MMOL/L (ref 133–144)
TRIGL SERPL-MCNC: 147 MG/DL

## 2022-11-25 PROCEDURE — 83036 HEMOGLOBIN GLYCOSYLATED A1C: CPT

## 2022-11-25 PROCEDURE — 80061 LIPID PANEL: CPT

## 2022-11-25 PROCEDURE — 80048 BASIC METABOLIC PNL TOTAL CA: CPT

## 2022-11-25 PROCEDURE — 36415 COLL VENOUS BLD VENIPUNCTURE: CPT

## 2022-11-25 PROCEDURE — 82043 UR ALBUMIN QUANTITATIVE: CPT

## 2022-11-28 LAB — HBA1C MFR BLD: 11.8 % (ref 0–5.6)

## 2022-11-29 NOTE — RESULT ENCOUNTER NOTE
Dear Gato,     Here are your recent results.   A1c indicates uncontrolled diabetes.  Please take your antidiabetic medications as suggested at the time of your follow-up appointment.  The urine test result indicates protein leakage in the urine.  This is a complication from the diabetes.  Lisinopril medication you are currently taking is prescribed to prevent this process.  But the most important treatment to prevent protein leakage in the urine is to control your diabetes better.  LDL cholesterol is within the desired range.  Your HDL cholesterol is slightly lower than desired.  HDL cholesterol can be increased through exercise.   Electrolytes and kidney function results are stable compared to previous results.  Your blood glucose was very high on the blood work in the 300s.  Please let us know if you have any questions or concerns.    Regards,  Kimmy Morin MD

## 2023-01-06 ENCOUNTER — MYC MEDICAL ADVICE (OUTPATIENT)
Dept: ENDOCRINOLOGY | Facility: CLINIC | Age: 58
End: 2023-01-06

## 2023-01-06 ENCOUNTER — TELEPHONE (OUTPATIENT)
Dept: ENDOCRINOLOGY | Facility: CLINIC | Age: 58
End: 2023-01-06

## 2023-01-06 NOTE — TELEPHONE ENCOUNTER
Mychart to patient with appointment change.    Alessia Lezama [facility]  Adult Endocrinology  [facility]

## 2023-01-09 ENCOUNTER — MYC MEDICAL ADVICE (OUTPATIENT)
Dept: EDUCATION SERVICES | Facility: CLINIC | Age: 58
End: 2023-01-09

## 2023-01-09 DIAGNOSIS — Z79.4 TYPE 2 DIABETES MELLITUS WITH MICROALBUMINURIA, WITH LONG-TERM CURRENT USE OF INSULIN (H): Primary | ICD-10-CM

## 2023-01-09 DIAGNOSIS — R80.9 TYPE 2 DIABETES MELLITUS WITH MICROALBUMINURIA, WITH LONG-TERM CURRENT USE OF INSULIN (H): ICD-10-CM

## 2023-01-09 DIAGNOSIS — R80.9 TYPE 2 DIABETES MELLITUS WITH MICROALBUMINURIA, WITH LONG-TERM CURRENT USE OF INSULIN (H): Primary | ICD-10-CM

## 2023-01-09 DIAGNOSIS — Z79.4 TYPE 2 DIABETES MELLITUS WITH MICROALBUMINURIA, WITH LONG-TERM CURRENT USE OF INSULIN (H): ICD-10-CM

## 2023-01-09 DIAGNOSIS — E11.29 TYPE 2 DIABETES MELLITUS WITH MICROALBUMINURIA, WITH LONG-TERM CURRENT USE OF INSULIN (H): ICD-10-CM

## 2023-01-09 DIAGNOSIS — E11.29 TYPE 2 DIABETES MELLITUS WITH MICROALBUMINURIA, WITH LONG-TERM CURRENT USE OF INSULIN (H): Primary | ICD-10-CM

## 2023-01-10 NOTE — TELEPHONE ENCOUNTER
Per Dr.Kidmealem Morin: increase Mounjaro from 2.5 mg once weekly to 5.0 mg once weekly.  Prescription sent and MyChart message sent advised patient of change.    Kadi Sahu RN, [facility]

## 2023-01-11 RX ORDER — TIRZEPATIDE 2.5 MG/.5ML
INJECTION, SOLUTION SUBCUTANEOUS
Qty: 2 ML | Refills: 0 | OUTPATIENT
Start: 2023-01-11

## 2023-02-13 ENCOUNTER — TRANSFERRED RECORDS (OUTPATIENT)
Dept: MULTI SPECIALTY CLINIC | Facility: CLINIC | Age: 58
End: 2023-02-13

## 2023-02-13 LAB — RETINOPATHY: NORMAL

## 2023-02-21 ENCOUNTER — TELEPHONE (OUTPATIENT)
Dept: ENDOCRINOLOGY | Facility: CLINIC | Age: 58
End: 2023-02-21
Payer: COMMERCIAL

## 2023-02-21 NOTE — PROGRESS NOTES
Outcome for 02/21/23 11:06 AM: ReDoc Software message sent  Gissell Herman LPN   Outcome for 02/21/23 2:09 PM: Per patient, will send BG readings via rumredith Herman LPN   Outcome for 02/22/23 8:37 AM: Glucose Readings sent via ReDoc Software  Pamela Russell MA

## 2023-02-21 NOTE — TELEPHONE ENCOUNTER
Spoke with patient in regards to obtain blood sugar data for appointment on 2/23/2023. Patient is at work now but will try to send readings via Simple Admit.     Gissell Herman LPN 02/21/23 2:08 PM

## 2023-02-22 ASSESSMENT — ENCOUNTER SYMPTOMS
HALLUCINATIONS: 0
WHEEZING: 0
HOARSE VOICE: 0
SINUS CONGESTION: 0
HEMOPTYSIS: 0
FATIGUE: 0
ALTERED TEMPERATURE REGULATION: 0
SPUTUM PRODUCTION: 1
POLYPHAGIA: 0
COUGH: 1
NECK MASS: 0
TROUBLE SWALLOWING: 0
FEVER: 0
CHILLS: 0
WEIGHT GAIN: 0
SHORTNESS OF BREATH: 0
POLYDIPSIA: 0
DECREASED APPETITE: 1
DYSPNEA ON EXERTION: 0
COUGH DISTURBING SLEEP: 0
SMELL DISTURBANCE: 0
TASTE DISTURBANCE: 0
POSTURAL DYSPNEA: 0
NIGHT SWEATS: 0
SINUS PAIN: 0
SORE THROAT: 0
SNORES LOUDLY: 1
WEIGHT LOSS: 1
INCREASED ENERGY: 0

## 2023-02-23 ENCOUNTER — TELEPHONE (OUTPATIENT)
Dept: ENDOCRINOLOGY | Facility: CLINIC | Age: 58
End: 2023-02-23

## 2023-02-23 ENCOUNTER — VIRTUAL VISIT (OUTPATIENT)
Dept: ENDOCRINOLOGY | Facility: CLINIC | Age: 58
End: 2023-02-23
Payer: COMMERCIAL

## 2023-02-23 DIAGNOSIS — R80.9 TYPE 2 DIABETES MELLITUS WITH MICROALBUMINURIA, WITH LONG-TERM CURRENT USE OF INSULIN (H): ICD-10-CM

## 2023-02-23 DIAGNOSIS — I10 ESSENTIAL HYPERTENSION: ICD-10-CM

## 2023-02-23 DIAGNOSIS — Z79.4 TYPE 2 DIABETES MELLITUS WITH MICROALBUMINURIA, WITH LONG-TERM CURRENT USE OF INSULIN (H): ICD-10-CM

## 2023-02-23 DIAGNOSIS — E11.65 TYPE 2 DIABETES MELLITUS WITH HYPERGLYCEMIA, WITH LONG-TERM CURRENT USE OF INSULIN (H): Primary | ICD-10-CM

## 2023-02-23 DIAGNOSIS — E11.29 TYPE 2 DIABETES MELLITUS WITH MICROALBUMINURIA, WITH LONG-TERM CURRENT USE OF INSULIN (H): ICD-10-CM

## 2023-02-23 DIAGNOSIS — E78.5 DYSLIPIDEMIA: ICD-10-CM

## 2023-02-23 DIAGNOSIS — Z79.4 TYPE 2 DIABETES MELLITUS WITH HYPERGLYCEMIA, WITH LONG-TERM CURRENT USE OF INSULIN (H): Primary | ICD-10-CM

## 2023-02-23 PROCEDURE — 99214 OFFICE O/P EST MOD 30 MIN: CPT | Mod: VID | Performed by: PHYSICIAN ASSISTANT

## 2023-02-23 RX ORDER — LISINOPRIL 20 MG/1
20 TABLET ORAL DAILY
Qty: 90 TABLET | Refills: 1 | Status: SHIPPED | OUTPATIENT
Start: 2023-02-23 | End: 2024-03-01

## 2023-02-23 RX ORDER — BLOOD-GLUCOSE SENSOR
1 EACH MISCELLANEOUS
Qty: 6 EACH | Refills: 1 | Status: SHIPPED | OUTPATIENT
Start: 2023-02-23 | End: 2023-08-16

## 2023-02-23 RX ORDER — METFORMIN HCL 500 MG
1000 TABLET, EXTENDED RELEASE 24 HR ORAL 2 TIMES DAILY WITH MEALS
Qty: 360 TABLET | Refills: 1 | Status: SHIPPED | OUTPATIENT
Start: 2023-02-23 | End: 2024-03-01

## 2023-02-23 RX ORDER — EMPAGLIFLOZIN 25 MG/1
25 TABLET, FILM COATED ORAL DAILY
Qty: 90 TABLET | Refills: 1 | Status: SHIPPED | OUTPATIENT
Start: 2023-02-23 | End: 2023-04-18

## 2023-02-23 RX ORDER — EMPAGLIFLOZIN 25 MG/1
TABLET, FILM COATED ORAL
COMMUNITY
Start: 2023-01-07 | End: 2023-02-23

## 2023-02-23 NOTE — NURSING NOTE
Is the patient currently in the state of [place]? YES    Visit mode:VIDEO    If the visit is dropped, the patient can be reconnected by: VIDEO VISIT: Send to e-mail at: [email or web address]    Will anyone else be joining the visit? NO      How would you like to obtain your AVS? MyChart    Are changes needed to the allergy or medication list? YES: Patient reports taking Jardiance 25 mg. Added to medication list via medication reconciliation per patient request.     Reason for visit: Follow up  Chief Complaint   Patient presents with     Video Visit     Patient has concerns regarding prescriptions. He has been having a hard time getting prescriptions and theres a gap in time where he doesn't have medication. Had issues with his pharmacy. Patient also has concerns regarding burning pain after work. Patient reports this only happens when he is not moving. Patient reports weight loss and not interested in eating sometimes (he notes his BG will rise when he doesn't eat). Patient reports not eating as much.

## 2023-02-23 NOTE — TELEPHONE ENCOUNTER
PA Initiation    Medication: FreeStyle Yovanny 3 Sensor - PA Pending   Insurance Company: [employer] ([facility]) - Phone [phone] Fax [phone]  Pharmacy Filling the Rx: [id number] - [place] - [address] AT [facility] & [place]  Filling Pharmacy Phone:    Filling Pharmacy Fax:    Start Date: 2/23/2023          Thank you,    Siva Morris [facility]  Pharmacy Clinic [facility]  [email or web address]   Phone: [phone]  Fax: [phone]

## 2023-02-23 NOTE — PATIENT INSTRUCTIONS
It was nice meeting with you today.    I will [email or web address]. I will also try sending a new prescription or the FreeStyle Yovanny 3. This will helps us keep a closer eye on your blood sugars.    You are also due for a follow-up hemoglobin A1C. A lab order will be placed today, please have that drawn within the next two weeks.    Your lab results will be available on Listia.     We will plan on a follow-up visit in 3 mos. If you have any further questions or have difficulty picking up your prescriptions, please contact me through Listia.

## 2023-02-24 ENCOUNTER — TELEPHONE (OUTPATIENT)
Dept: ENDOCRINOLOGY | Facility: CLINIC | Age: 58
End: 2023-02-24
Payer: COMMERCIAL

## 2023-02-24 NOTE — TELEPHONE ENCOUNTER
Prior authorization is denied. Please see denial rational below. I have released the prescription to patients preferred pharmacy for cash pay option.    Thank you,     Siva Morris, [facility]  Pharmacy Clinic [facility]  [email or web address]   Phone: [phone]  Fax: [phone]

## 2023-02-24 NOTE — TELEPHONE ENCOUNTER
PRIOR AUTHORIZATION DENIED    Medication: FreeStyle Yovanny 3 Sensor - PA Denied    Denial Date: 2/23/2023    Denial Rational: Patient must be on an insulin regimen of three or more insulin injections per day or use a insulin pump and patient must regularly monitor blood glucose four or more times per day        Appeal Information:                     Thank you,     Siva Morris [facility]  Pharmacy Clinic [facility]  [email or web address]   Phone: [phone]  Fax: [phone]

## 2023-03-22 ENCOUNTER — TELEPHONE (OUTPATIENT)
Dept: ENDOCRINOLOGY | Facility: CLINIC | Age: 58
End: 2023-03-22
Payer: COMMERCIAL

## 2023-03-22 NOTE — TELEPHONE ENCOUNTER
Prior Authorization Retail Medication Request    Medication/Dose: Mounjaro      CoverMyMeds    Key: [id number]

## 2023-03-25 ENCOUNTER — HEALTH MAINTENANCE LETTER (OUTPATIENT)
Age: 58
End: 2023-03-25

## 2023-03-25 NOTE — TELEPHONE ENCOUNTER
Central Prior Authorization Team   Phone: [phone]      PA Initiation    Medication: Mounjaro  Insurance Company: [employer]RX ([facility]) - Phone [phone] Fax [phone]  [facility] the Rx[id number] - [place], [place] - [address] AT [facility] & [place]  Filling Pharmacy Phone: [phone]  Filling Pharmacy Fax:    Start Date: 3/25/2023

## 2023-03-27 DIAGNOSIS — E11.29 TYPE 2 DIABETES MELLITUS WITH MICROALBUMINURIA, WITH LONG-TERM CURRENT USE OF INSULIN (H): ICD-10-CM

## 2023-03-27 DIAGNOSIS — R80.9 TYPE 2 DIABETES MELLITUS WITH MICROALBUMINURIA, WITH LONG-TERM CURRENT USE OF INSULIN (H): ICD-10-CM

## 2023-03-27 DIAGNOSIS — Z79.4 TYPE 2 DIABETES MELLITUS WITH MICROALBUMINURIA, WITH LONG-TERM CURRENT USE OF INSULIN (H): ICD-10-CM

## 2023-03-27 NOTE — TELEPHONE ENCOUNTER
Prior Authorization Approval    Authorization Effective Date: 3/25/2023  Authorization Expiration Date: 3/25/2024  Medication: Mounjaro  Approved Dose/Quantity:   Reference #:     Insurance Company: [employer] ([facility]) - Phone [phone] Fax [phone]  Expected CoPay:       CoPay Card Available:      Foundation Assistance Needed:    Which Pharmacy is filling the prescription (Not needed for infusion/clinic administered): [id number] [address] AT [facility] & [place]  Pharmacy Notified: Yes-Pharmacy needs need script as fills has run out.  Patient Notified: No

## 2023-04-11 ENCOUNTER — LAB (OUTPATIENT)
Dept: LAB | Facility: CLINIC | Age: 58
End: 2023-04-11
Payer: COMMERCIAL

## 2023-04-11 DIAGNOSIS — E11.65 TYPE 2 DIABETES MELLITUS WITH HYPERGLYCEMIA, WITH LONG-TERM CURRENT USE OF INSULIN (H): ICD-10-CM

## 2023-04-11 DIAGNOSIS — Z79.4 TYPE 2 DIABETES MELLITUS WITH HYPERGLYCEMIA, WITH LONG-TERM CURRENT USE OF INSULIN (H): ICD-10-CM

## 2023-04-11 LAB — HBA1C MFR BLD: 9.2 % (ref 0–5.6)

## 2023-04-11 PROCEDURE — 36415 COLL VENOUS BLD VENIPUNCTURE: CPT

## 2023-04-11 PROCEDURE — 83036 HEMOGLOBIN GLYCOSYLATED A1C: CPT

## 2023-04-14 DIAGNOSIS — E11.29 TYPE 2 DIABETES MELLITUS WITH MICROALBUMINURIA, WITH LONG-TERM CURRENT USE OF INSULIN (H): ICD-10-CM

## 2023-04-14 DIAGNOSIS — Z79.4 TYPE 2 DIABETES MELLITUS WITH HYPERGLYCEMIA, WITH LONG-TERM CURRENT USE OF INSULIN (H): ICD-10-CM

## 2023-04-14 DIAGNOSIS — R80.9 TYPE 2 DIABETES MELLITUS WITH MICROALBUMINURIA, WITH LONG-TERM CURRENT USE OF INSULIN (H): ICD-10-CM

## 2023-04-14 DIAGNOSIS — Z79.4 TYPE 2 DIABETES MELLITUS WITH MICROALBUMINURIA, WITH LONG-TERM CURRENT USE OF INSULIN (H): ICD-10-CM

## 2023-04-14 DIAGNOSIS — E11.65 TYPE 2 DIABETES MELLITUS WITH HYPERGLYCEMIA, WITH LONG-TERM CURRENT USE OF INSULIN (H): ICD-10-CM

## 2023-04-18 RX ORDER — EMPAGLIFLOZIN 25 MG/1
25 TABLET, FILM COATED ORAL DAILY
Qty: 90 TABLET | Refills: 0 | Status: SHIPPED | OUTPATIENT
Start: 2023-04-18 | End: 2023-08-16

## 2023-04-18 NOTE — TELEPHONE ENCOUNTER
JARDIANCE 25 MG TABS tablet  Passed protocol    Last ordered 2/23/23 for 90 + 1. Different pharmacy. Remaining refills sent to requested pharmacy.      tirzepatide (MOUNJARO) 5 MG/0.5ML pen      Last Written Prescription Date:  3/27/23  Last Fill Quantity: 2ml,   # refills: 5  Last Office Visit : 2/23/23  Future Office visit:  5/24/23    Routing refill request to provider for review/approval because:  Drug not on the [facility], [facility] or [facility] refill protocol or controlled substance

## 2023-05-15 NOTE — PROGRESS NOTES
Outcome for 05/15/23 5:19 PM: [employer] message sent  Pamela Russell MA  Outcome for 05/22/23 2:54 PM: Left Voicemail   Pamela Russell MA  Outcome for 05/22/23 4:08 PM: Glucose readings sent via [employer]  Pamela Russell MA    Patient is showing 4/5 MNCM met. A1c not in range   Pamela Russell MA

## 2023-05-22 ENCOUNTER — TELEPHONE (OUTPATIENT)
Dept: ENDOCRINOLOGY | Facility: CLINIC | Age: 58
End: 2023-05-22
Payer: COMMERCIAL

## 2023-05-22 ASSESSMENT — ENCOUNTER SYMPTOMS
EYE REDNESS: 0
EYE IRRITATION: 0
STIFFNESS: 1
NECK PAIN: 0
JOINT SWELLING: 0
EYE WATERING: 0
DOUBLE VISION: 1
BACK PAIN: 0
MUSCLE CRAMPS: 1
MUSCLE WEAKNESS: 0
MYALGIAS: 0
ARTHRALGIAS: 1
EYE PAIN: 0

## 2023-05-22 NOTE — TELEPHONE ENCOUNTER
Called patient and left voicemail. Patient has appointment on 5/24/23. Need to collect 14 days of blood sugar readings if patient is using meter, or if patient is using CGM, need to get patients device uploaded to retrieve report for provider to review.  Pamela Russell MA

## 2023-05-24 ENCOUNTER — VIRTUAL VISIT (OUTPATIENT)
Dept: ENDOCRINOLOGY | Facility: CLINIC | Age: 58
End: 2023-05-24
Payer: COMMERCIAL

## 2023-05-24 DIAGNOSIS — E11.65 UNCONTROLLED TYPE 2 DIABETES MELLITUS WITH HYPERGLYCEMIA (H): Primary | ICD-10-CM

## 2023-05-24 DIAGNOSIS — N18.1 CHRONIC KIDNEY DISEASE, STAGE 1: ICD-10-CM

## 2023-05-24 PROCEDURE — 99213 OFFICE O/P EST LOW 20 MIN: CPT | Mod: VID | Performed by: PHYSICIAN ASSISTANT

## 2023-05-24 NOTE — NURSING NOTE
Is the patient currently in the state of [place]? YES    Visit mode:VIDEO    If the visit is dropped, the patient can be reconnected by: VIDEO VISIT: Text to cell phone: [phone]    Will anyone else be joining the visit? NO      How would you like to obtain your AVS? MyChart    Are changes needed to the allergy or medication list? NO    Reason for visit: RECHECK and Video Visit    ** insurance is needing 3 month supplies on all medications.

## 2023-05-24 NOTE — LETTER
5/24/2023         RE: Miquel Llanes  [address] [facility] [address]        Dear Colleague,    Thank you for referring your patient, Miquel Llanes, to the [facility]. Please see a copy of my visit note below.    Outcome for 05/15/23 5:19 PM: [employer] message sent  Pamela Russell MA  Outcome for 05/22/23 2:54 PM: Left Voicemail   Pamela Russell MA  Outcome for 05/22/23 4:08 PM: Glucose readings sent via [employer]  Pamela Russell MA    Patient is showing 4/5 MNCM met. A1c not in range   Pamela Russell MA      Is the patient currently in the state of [place]? YES    Visit mode:VIDEO    If the visit is dropped, the patient can be reconnected by: VIDEO VISIT: Text to cell phone: [phone]    Will anyone else be joining the visit? NO      How would you like to obtain your AVS? MyChart    Are changes needed to the allergy or medication list? NO    Reason for visit: RECHECK and Video Visit            Assessment/Plan :   1. Type 2 DM, uncontrolled. Gato is doing much better. We still have room for improvement but he continues to make healthy lifestyle changes and he remains stable on his current medications.     We discussed the importance of close glucose monitoring. He feels like he does better with the FreeStyle Yovanny 3 but he was frustrated that they would only last for about 10-12 days. We discussed ways to keep the sensor attached, such as skin prep or applying another adhesive over the sensor. He will look up options and start using the sensor, again.    We also reviewed the possible adverse effects of the Mounjaro. It is causing some GI disturbance but he states that it is manageable. We discussed the importance of eating small, healthy meals. He will continue to work on improving his diet and we will stick with the 5 mg dose.    His blood sugars are still a little elevated, so we will increase the Lantus to 38 unit(s) daily. If he has any problems, he is to contact my  office.      Due to the COVID 19 pandemic this visit was a telephone/video visit in order to help prevent spread of infection in this patient and the general population. The patient gave verbal consent for the visit today. I have independently reviewed and interpreted labs, imaging as indicated.       Distant Location (provider location):  Off-site  Mode of Communication:  Video Conference via [employer]  Chart review/prep time 5   Joined the call at 5/24/2023, 8:26:27 am.  Left the call at 5/24/2023, 8:40:19 am.  You were on the call for 13 minutes 52 seconds .  23 minutes spent on the date of the encounter doing chart review, history and exam, documentation and further activities as noted above.      Chief complaint:  Miquel is a 58 year old male who returns for follow-up of Type 2 DM.    I have reviewed Care Everywhere including [facility], [facility], [facility], [facility], [facility] , [facility], [place] lab reports, imaging reports and provider notes as indicated.      HISTORY OF PRESENT ILLNESS  Gato continues to work on improving his blood sugars. He was diagnosed with diabetes about 15 yrs ago. It has been a struggle to get his hemoglobin A1C to goal but he is motivated to get his blood sugars down.    He is currently taking Mounjaro 5 mg weekly, along with Jardiance 25 mg daily, Metformin 1000 mg BID, and Lantus 35 unit(s) daily. He tried to use the FreeStyle Yovanny 3 but he has had trouble keeping the sensors attacked to his arm. He has been using fingerstick testing when he is unable to use the sensor.    He has not had any problems with severe hyperglycemia and/or hypoglycemia since our last visit. He has noticed that most of his blood sugars are under 250 mg/dl, which is an improvement from previous. He has not had any problems with blurred vision or worsening numbness/tingling in his feet. He has been trying to get back on his bike but it has been slow going. He has also had  some knee pain, off and on. Lastly, the Mounjaro causes some abdominal discomfort and it really suppresses his appetite. He is not concerned about it but he thought he would mention it.    Endocrine relevant labs are as follows:   Latest Reference Range & Units 11/25/22 11:24   Albumin Urine mg/g Cr 0.00 - 17.00 mg/g Cr 116.30 (H)   (H): Data is abnormally high     Latest Reference Range & Units 11/25/22 11:24   Albumin Urine mg/L mg/L 107      Latest Reference Range & Units 04/11/23 07:03   Hemoglobin A1C 0.0 - 5.6 % 9.2 (H)   (H): Data is abnormally high    REVIEW OF SYSTEMS  Answers for HPI/ROS submitted by the patient on 5/22/2023  General Symptoms: No  Skin Symptoms: No  HENT Symptoms: No  EYE SYMPTOMS: Yes  HEART SYMPTOMS: No  LUNG SYMPTOMS: No  INTESTINAL SYMPTOMS: No  URINARY SYMPTOMS: No  REPRODUCTIVE SYMPTOMS: No  SKELETAL SYMPTOMS: Yes  BLOOD SYMPTOMS: No  NERVOUS SYSTEM SYMPTOMS: No  MENTAL HEALTH SYMPTOMS: No  Eye pain: No  Vision loss: No  Dry eyes: No  Watery eyes: No  Eye bulging: No  Double vision: Yes  Flashing of lights: No  Spots: No  Floaters: No  Redness: No  Crossed eyes: No  Tunnel Vision: No  Yellowing of eyes: No  Eye irritation: No  Back pain: No  Muscle aches: No  Neck pain: No  Swollen joints: No  Joint pain: Yes  Bone pain: Yes  Muscle cramps: Yes  Muscle weakness: No  Joint stiffness: Yes  Bone fracture: No    Endocrine: positive for diabetes  Skin: negative  Eyes: negative for, visual blurring  Ears/Nose/Throat: negative  Respiratory: No shortness of breath, dyspnea on exertion, cough, or hemoptysis  Cardiovascular: negative for, chest pain, dyspnea on exertion and exercise intolerance  Gastrointestinal: positive for loss of appetite, negative for, nausea, vomiting, constipation and diarrhea  Genitourinary: negative for, nocturia, dysuria, frequency and urgency  Musculoskeletal: positive for joint pain, joint swelling and joint stiffness, negative for, muscular weakness and nocturnal  cramping  Neurologic: negative for and numbness or tingling of feet  Psychiatric: negative  Hematologic/Lymphatic/Immunologic: negative    Past Medical History  Past Medical History:   Diagnosis Date     Arthritis      Background diabetic retinopathy, mild, ou 10/15/2022     Diabetes (H)      Hypertension        Medications  Current Outpatient Medications   Medication Sig Dispense Refill     Continuous Blood Gluc Sensor (FREESTYLE SHYAM 3 SENSOR) MISC 1 each every 14 days 6 each 1     insulin glargine (LANTUS PEN) 100 UNIT/ML pen Inject 40 Units Subcutaneous At Bedtime 45 mL 1     JARDIANCE 25 MG TABS tablet Take 1 tablet (25 mg) by mouth daily 90 tablet 0     lisinopril (ZESTRIL) 20 MG tablet Take 1 tablet (20 mg) by mouth daily 90 tablet 1     metFORMIN (GLUCOPHAGE XR) 500 MG 24 hr tablet Take 2 tablets (1,000 mg) by mouth 2 times daily (with meals) 360 tablet 1     rosuvastatin (CRESTOR) 10 MG tablet Take 1 tablet (10 mg) by mouth daily - stop atorvastatin 6/8/22 90 tablet 0     tirzepatide (MOUNJARO) 5 MG/0.5ML pen Inject 5 mg Subcutaneous every 7 days 6 mL 1       Allergies  Allergies   Allergen Reactions     Atorvastatin Rash       Family History  family history includes Glaucoma in his maternal grandmother; Substance Abuse in his father.    Social History  Social History     Tobacco Use     Smoking status: Never     Passive exposure: Never     Smokeless tobacco: Never   Vaping Use     Vaping status: Never Used   Substance Use Topics     Alcohol use: Never     Drug use: Never       Physical Exam  There is no height or weight on file to calculate BMI.  GENERAL: no distress  SKIN: Visible skin clear. No significant rash, abnormal pigmentation or lesions.  EYES: Eyes grossly normal to inspection.  No discharge or erythema, or obvious scleral/conjunctival abnormalities. Pt is wearing eye glasses  NECK: visible goiter is not present; no visible neck masses  RESP: No audible wheeze, cough, or visible cyanosis.  No  visible retractions or increased work of breathing.    NEURO: Awake, alert, responds appropriately to questions.  Mentation and speech fluent.  PSYCH:affect normal and appearance well-groomed.    DATA REVIEW  Please see attached glucose logs        Again, thank you for allowing me to participate in the care of your patient.        Sincerely,        Marylou Sawyer PA-C

## 2023-05-24 NOTE — PROGRESS NOTES
Is the patient currently in the state of [place]? YES    Visit mode:VIDEO    If the visit is dropped, the patient can be reconnected by: VIDEO VISIT: Text to cell phone: [phone]    Will anyone else be joining the visit? NO      How would you like to obtain your AVS? MyChart    Are changes needed to the allergy or medication list? NO    Reason for visit: RECHECK and Video Visit

## 2023-05-24 NOTE — PROGRESS NOTES
Assessment/Plan :   1. Type 2 DM, uncontrolled. Gato is doing much better. We still have room for improvement but he continues to make healthy lifestyle changes and he remains stable on his current medications.     We discussed the importance of close glucose monitoring. He feels like he does better with the FreeStyle Yovanny 3 but he was frustrated that they would only last for about 10-12 days. We discussed ways to keep the sensor attached, such as skin prep or applying another adhesive over the sensor. He will look up options and start using the sensor, again.    We also reviewed the possible adverse effects of the Mounjaro. It is causing some GI disturbance but he states that it is manageable. We discussed the importance of eating small, healthy meals. He will continue to work on improving his diet and we will stick with the 5 mg dose.    His blood sugars are still a little elevated, so we will increase the Lantus to 38 unit(s) daily. If he has any problems, he is to contact my office.      Due to the COVID 19 pandemic this visit was a telephone/video visit in order to help prevent spread of infection in this patient and the general population. The patient gave verbal consent for the visit today. I have independently reviewed and interpreted labs, imaging as indicated.       Distant Location (provider location):  Off-site  Mode of Communication:  Video Conference via [employer]  Chart review/prep time 5   Joined the call at 5/24/2023, 8:26:27 am.  Left the call at 5/24/2023, 8:40:19 am.  You were on the call for 13 minutes 52 seconds .  23 minutes spent on the date of the encounter doing chart review, history and exam, documentation and further activities as noted above.      Chief complaint:  Miquel is a 58 year old male who returns for follow-up of Type 2 DM.    I have reviewed Care Everywhere including [facility], [facility], [facility] [place] [facility], [facility], [place], [facility], [facility] , [facility], [place] lab  reports, imaging reports and provider notes as indicated.      HISTORY OF PRESENT ILLNESS  Gato continues to work on improving his blood sugars. He was diagnosed with diabetes about 15 yrs ago. It has been a struggle to get his hemoglobin A1C to goal but he is motivated to get his blood sugars down.    He is currently taking Mounjaro 5 mg weekly, along with Jardiance 25 mg daily, Metformin 1000 mg BID, and Lantus 35 unit(s) daily. He tried to use the FreeStyle Yovanny 3 but he has had trouble keeping the sensors attacked to his arm. He has been using fingerstick testing when he is unable to use the sensor.    He has not had any problems with severe hyperglycemia and/or hypoglycemia since our last visit. He has noticed that most of his blood sugars are under 250 mg/dl, which is an improvement from previous. He has not had any problems with blurred vision or worsening numbness/tingling in his feet. He has been trying to get back on his bike but it has been slow going. He has also had some knee pain, off and on. Lastly, the Mounjaro causes some abdominal discomfort and it really suppresses his appetite. He is not concerned about it but he thought he would mention it.    Endocrine relevant labs are as follows:   Latest Reference Range & Units 11/25/22 11:24   Albumin Urine mg/g Cr 0.00 - 17.00 mg/g Cr 116.30 (H)   (H): Data is abnormally high     Latest Reference Range & Units 11/25/22 11:24   Albumin Urine mg/L mg/L 107      Latest Reference Range & Units 04/11/23 07:03   Hemoglobin A1C 0.0 - 5.6 % 9.2 (H)   (H): Data is abnormally high    REVIEW OF SYSTEMS  Answers for HPI/ROS submitted by the patient on 5/22/2023  General Symptoms: No  Skin Symptoms: No  HENT Symptoms: No  EYE SYMPTOMS: Yes  HEART SYMPTOMS: No  LUNG SYMPTOMS: No  INTESTINAL SYMPTOMS: No  URINARY SYMPTOMS: No  REPRODUCTIVE SYMPTOMS: No  SKELETAL SYMPTOMS: Yes  BLOOD SYMPTOMS: No  NERVOUS SYSTEM SYMPTOMS: No  MENTAL HEALTH SYMPTOMS: No  Eye pain:  No  Vision loss: No  Dry eyes: No  Watery eyes: No  Eye bulging: No  Double vision: Yes  Flashing of lights: No  Spots: No  Floaters: No  Redness: No  Crossed eyes: No  Tunnel Vision: No  Yellowing of eyes: No  Eye irritation: No  Back pain: No  Muscle aches: No  Neck pain: No  Swollen joints: No  Joint pain: Yes  Bone pain: Yes  Muscle cramps: Yes  Muscle weakness: No  Joint stiffness: Yes  Bone fracture: No    Endocrine: positive for diabetes  Skin: negative  Eyes: negative for, visual blurring  Ears/Nose/Throat: negative  Respiratory: No shortness of breath, dyspnea on exertion, cough, or hemoptysis  Cardiovascular: negative for, chest pain, dyspnea on exertion and exercise intolerance  Gastrointestinal: positive for loss of appetite, negative for, nausea, vomiting, constipation and diarrhea  Genitourinary: negative for, nocturia, dysuria, frequency and urgency  Musculoskeletal: positive for joint pain, joint swelling and joint stiffness, negative for, muscular weakness and nocturnal cramping  Neurologic: negative for and numbness or tingling of feet  Psychiatric: negative  Hematologic/Lymphatic/Immunologic: negative    Past Medical History  Past Medical History:   Diagnosis Date     Arthritis      Background diabetic retinopathy, mild, ou 10/15/2022     Diabetes (H)      Hypertension        Medications  Current Outpatient Medications   Medication Sig Dispense Refill     Continuous Blood Gluc Sensor (FREESTYLE SHYAM 3 SENSOR) MISC 1 each every 14 days 6 each 1     insulin glargine (LANTUS PEN) 100 UNIT/ML pen Inject 40 Units Subcutaneous At Bedtime 45 mL 1     JARDIANCE 25 MG TABS tablet Take 1 tablet (25 mg) by mouth daily 90 tablet 0     lisinopril (ZESTRIL) 20 MG tablet Take 1 tablet (20 mg) by mouth daily 90 tablet 1     metFORMIN (GLUCOPHAGE XR) 500 MG 24 hr tablet Take 2 tablets (1,000 mg) by mouth 2 times daily (with meals) 360 tablet 1     rosuvastatin (CRESTOR) 10 MG tablet Take 1 tablet (10 mg) by mouth  daily - stop atorvastatin 6/8/22 90 tablet 0     tirzepatide (MOUNJARO) 5 MG/0.5ML pen Inject 5 mg Subcutaneous every 7 days 6 mL 1       Allergies  Allergies   Allergen Reactions     Atorvastatin Rash       Family History  family history includes Glaucoma in his maternal grandmother; Substance Abuse in his father.    Social History  Social History     Tobacco Use     Smoking status: Never     Passive exposure: Never     Smokeless tobacco: Never   Vaping Use     Vaping status: Never Used   Substance Use Topics     Alcohol use: Never     Drug use: Never       Physical Exam  There is no height or weight on file to calculate BMI.  GENERAL: no distress  SKIN: Visible skin clear. No significant rash, abnormal pigmentation or lesions.  EYES: Eyes grossly normal to inspection.  No discharge or erythema, or obvious scleral/conjunctival abnormalities. Pt is wearing eye glasses  NECK: visible goiter is not present; no visible neck masses  RESP: No audible wheeze, cough, or visible cyanosis.  No visible retractions or increased work of breathing.    NEURO: Awake, alert, responds appropriately to questions.  Mentation and speech fluent.  PSYCH:affect normal and appearance well-groomed.    DATA REVIEW  Please see attached glucose logs

## 2023-06-10 ENCOUNTER — HEALTH MAINTENANCE LETTER (OUTPATIENT)
Age: 58
End: 2023-06-10

## 2023-06-12 ENCOUNTER — TELEPHONE (OUTPATIENT)
Dept: ENDOCRINOLOGY | Facility: CLINIC | Age: 58
End: 2023-06-12

## 2023-06-12 NOTE — TELEPHONE ENCOUNTER
Spoke with patient. Pt states he would like to cancel appt. Pt states he was recently seen on 5/24 and has a follow up scheduled 8/16/23.Appt cancelled. Pamela Russell CMA on 6/12/2023 at 2:50 PM

## 2023-06-27 DIAGNOSIS — I10 ESSENTIAL HYPERTENSION: ICD-10-CM

## 2023-06-29 RX ORDER — LISINOPRIL 10 MG/1
TABLET ORAL
Qty: 90 TABLET | Refills: 3 | Status: SHIPPED | OUTPATIENT
Start: 2023-06-29 | End: 2023-08-16 | Stop reason: DRUGHIGH

## 2023-06-29 NOTE — TELEPHONE ENCOUNTER
LISINOPRIL 10MG TABLETS    Virtual Visit    5/24/2023  [facility] Marylou Batista PA-C  Endocrinology, Diabetes, and Metabolism

## 2023-08-08 NOTE — PROGRESS NOTES
Outcome for 08/08/23 4:26 PM: [employer] message sent  Pamela Russell MA  Outcome for 08/14/23 3:48 PM: Left Voicemail   Devi Art MA  Outcome for 08/15/23 11:32 AM: Per patient, will send BG readings via [employer] Art MA  Outcome for 08/16/23 7:50 AM: Glucose readings sent via [employer] Art MA      Patient is showing 3/5 MNCM met. BP out range and A1c not in range - BP not on file.   Pamela Russell MA     209  186  197  203  184  174  213  237  213  237

## 2023-08-14 ENCOUNTER — TELEPHONE (OUTPATIENT)
Dept: ENDOCRINOLOGY | Facility: CLINIC | Age: 58
End: 2023-08-14
Payer: COMMERCIAL

## 2023-08-14 NOTE — TELEPHONE ENCOUNTER
Attempted to reach patient for upcoming appointment; 14 days of glucose data needed. No answer, LM on VM to call office back.     Appointment with Marylou Sawyer PA-C on 8/16/23    Devi Art MA

## 2023-08-15 NOTE — TELEPHONE ENCOUNTER
Spoke with patient - he thought his readings were auto transferred from his [employer] meter - but there is nothing on file. Pt will send BG readings via [employer] this evening.    Devi Art MA

## 2023-08-16 ENCOUNTER — VIRTUAL VISIT (OUTPATIENT)
Dept: ENDOCRINOLOGY | Facility: CLINIC | Age: 58
End: 2023-08-16
Payer: COMMERCIAL

## 2023-08-16 VITALS — BODY MASS INDEX: 33.02 KG/M2 | WEIGHT: 225 LBS

## 2023-08-16 DIAGNOSIS — Z79.4 TYPE 2 DIABETES MELLITUS WITH HYPERGLYCEMIA, WITH LONG-TERM CURRENT USE OF INSULIN (H): ICD-10-CM

## 2023-08-16 DIAGNOSIS — E11.65 TYPE 2 DIABETES MELLITUS WITH HYPERGLYCEMIA, WITH LONG-TERM CURRENT USE OF INSULIN (H): ICD-10-CM

## 2023-08-16 PROCEDURE — 99213 OFFICE O/P EST LOW 20 MIN: CPT | Mod: VID | Performed by: PHYSICIAN ASSISTANT

## 2023-08-16 RX ORDER — EMPAGLIFLOZIN 25 MG/1
25 TABLET, FILM COATED ORAL DAILY
Qty: 90 TABLET | Refills: 3 | Status: SHIPPED | OUTPATIENT
Start: 2023-08-16 | End: 2024-03-22

## 2023-08-16 NOTE — LETTER
8/16/2023         RE: Miquel Llanes  [address] [facility] [address]        Dear Colleague,    Thank you for referring your patient, Miquel Llanes, to the [facility]. Please see a copy of my visit note below.    Outcome for 08/08/23 4:26 PM: [employer] message sent  Pamela Russell MA  Outcome for 08/14/23 3:48 PM: Left Voicemail   Devi Art MA  Outcome for 08/15/23 11:32 AM: Per patient, will send BG readings via [employer]  Devi Art MA  Outcome for 08/16/23 7:50 AM: Glucose readings sent via [employer]  Devi Art MA      Patient is showing 3/5 MNCM met. BP out range and A1c not in range - BP not on file.   Pamela Russell MA     209  186  197  203  184  174  213  237  213  237    Assessment/Plan :   Type 2 DM. Gato continues to work improving his blood sugars. He really feels like the Mounjaro is working well but he is unable to consistently get the medication. We discussed options, like switching to Trulicity, but he is worried about the cost. He is currently using a savings program to afford the Mounjaro. He would like to continue with the Mounjaro, for now. We also reviewed his current insulin dosing. Based on his blood sugars, he could use more Lantus. We discussed that it is not uncommon to feel worse, as we decrease blood sugars rapidly. His body is used to his blood sugars being in the 200s but we need to get them down. I would like him to try and increase the Lantus by a couple unit(s) every few days, until his morning blood sugar is at 150 mg/dl. If he has any problems, he can contact my office. He is also due for repeat laboratory testing. I will place a lab order today. We will plan on a follow-up appt in 3 mos.    Due to the COVID 19 pandemic this visit was a telephone/video visit in order to help prevent spread of infection in this patient and the general population. The patient gave verbal consent for the visit today. I have independently  reviewed and interpreted labs, imaging as indicated.       Distant Location (provider location):  Off-site  Mode of Communication:  Video Conference via [employer]  Chart review/prep time 5    Joined the call at 8/16/2023, 9:33:28 am.  Left the call at 8/16/2023, 9:51:38 am.  You were on the call for 18 minutes 10 seconds .  26 minutes spent on the date of the encounter doing chart review, history and exam, documentation and further activities as noted above.      Chief complaint:  Miquel is a 58 year old male seen in consultation at the request of Dr MCKEON have reviewed Care Everywhere including [facility], [facility], [facility], [place], [facility], [facility] , [facility], [place] lab reports, imaging reports and provider notes as indicated.      HISTORY OF PRESENT ILLNESS  Gato continues to work on improving his blood sugars. He is frustrated by his access to medication. He feels like the Mounjaro is working really well but it is not always available at the pharmacy. He has continued to take Lantus 38 unit(s) daily, Jardiance 25 mg daily, and metformin 2000 mg daily. He takes 5 mg of Mounjaro weekly when he is able to. He is currently monitoring his blood sugars with fingerstick testing.    Gato has not had any problems with severe hyperglycemia and/or hypoglycemia. He has noticed that his blood sugars are a little higher when he is off of the Mounjaro but he feels better, when they are elevated. He has not had any problems with blurred vision or numbness/tingling in his feet. He has noticed that his legs and feet will feel tight, on occasion. It is like the blood is pooling in his legs. However, he has not noticed an swelling, they just feel tight. The sensation improves with activity.     Gato has had diabetes for over 15 yrs. He has struggled to get his hemoglobin A1C to goal. He is motivated to get his numbers down, however, he also doesn't want to feel horrible. His previous provider tried  to increase his Lantus to 60 unit(s) and Gato was shaky and felt worse, than ever before. He has not history of diabetic retinopathy. He does have a history of albuminuria.    Endocrine relevant labs are as follows:   Latest Reference Range & Units 11/25/22 11:24   Albumin Urine mg/g Cr 0.00 - 17.00 mg/g Cr 116.30 (H)   (H): Data is abnormally high     Latest Reference Range & Units 11/25/22 11:24   Albumin Urine mg/L mg/L 107      Latest Reference Range & Units 04/11/23 07:03   Hemoglobin A1C 0.0 - 5.6 % 9.2 (H)   (H): Data is abnormally high    REVIEW OF SYSTEMS  Answers submitted by the patient for this visit:  Symptoms you have experienced in the last 30 days (Submitted on 8/9/2023)  General Symptoms: No  Skin Symptoms: No  HENT Symptoms: No  EYE SYMPTOMS: No  HEART SYMPTOMS: No  LUNG SYMPTOMS: No  INTESTINAL SYMPTOMS: No  URINARY SYMPTOMS: No  REPRODUCTIVE SYMPTOMS: No  SKELETAL SYMPTOMS: No  BLOOD SYMPTOMS: No  NERVOUS SYSTEM SYMPTOMS: No  MENTAL HEALTH SYMPTOMS: No    Endocrine: positive for, diabetes, and obesity  Skin: negative  Eyes: negative for, visual blurring, redness, tearing  Ears/Nose/Throat: negative for, nasal congestion  Respiratory: No shortness of breath, dyspnea on exertion, cough, or hemoptysis  Cardiovascular: negative for, chest pain, lower extremity edema, and exercise intolerance  Gastrointestinal: negative for, nausea, vomiting, constipation, and diarrhea  Genitourinary: negative for, nocturia, dysuria, frequency, and urgency  Musculoskeletal: positive for lower extremities feel tight on occasion, negative for, muscular weakness, and nocturnal cramping  Neurologic: negative for and numbness or tingling of feet  Psychiatric: negative  Hematologic/Lymphatic/Immunologic: negative    Past Medical History  Past Medical History:   Diagnosis Date     Arthritis      Background diabetic retinopathy, mild, ou 10/15/2022     Diabetes (H)      Hypertension        Medications  Current Outpatient  Medications   Medication Sig Dispense Refill     insulin glargine (LANTUS PEN) 100 UNIT/ML pen Inject 40 Units Subcutaneous At Bedtime 45 mL 1     JARDIANCE 25 MG TABS tablet Take 1 tablet (25 mg) by mouth daily 90 tablet 0     lisinopril (ZESTRIL) 20 MG tablet Take 1 tablet (20 mg) by mouth daily 90 tablet 1     metFORMIN (GLUCOPHAGE XR) 500 MG 24 hr tablet Take 2 tablets (1,000 mg) by mouth 2 times daily (with meals) 360 tablet 1     rosuvastatin (CRESTOR) 10 MG tablet Take 1 tablet (10 mg) by mouth daily - stop atorvastatin 6/8/22 90 tablet 0     tirzepatide (MOUNJARO) 5 MG/0.5ML pen Inject 5 mg Subcutaneous every 7 days 6 mL 1       Allergies  Allergies   Allergen Reactions     Atorvastatin Rash       Family History  family history includes Glaucoma in his maternal grandmother; Substance Abuse in his father.    Social History  Social History     Tobacco Use     Smoking status: Never     Passive exposure: Never     Smokeless tobacco: Never   Vaping Use     Vaping Use: Never used   Substance Use Topics     Alcohol use: Never     Drug use: Never       Physical Exam  Body mass index is 33.02 kg/m .  GENERAL: no distress  SKIN: Visible skin clear. No significant rash, abnormal pigmentation or lesions.  EYES: Eyes grossly normal to inspection.  No discharge or erythema, or obvious scleral/conjunctival abnormalities.  NECK: visible goiter is not present; no visible neck masses  RESP: No audible wheeze, cough, or visible cyanosis.  No visible retractions or increased work of breathing.    NEURO: Awake, alert, responds appropriately to questions.  Mentation and speech fluent.  PSYCH:affect normal and appearance well-groomed.      DATA REVIEW  Please see attached glucose logs      Again, thank you for allowing me to participate in the care of your patient.        Sincerely,        Marylou Sawyer PA-C

## 2023-08-16 NOTE — NURSING NOTE
Is the patient currently in the state of [place]? YES    Visit mode:VIDEO    If the visit is dropped, the patient can be reconnected by: VIDEO VISIT: Text to cell phone: [phone]    Will anyone else be joining the visit? NO      How would you like to obtain your AVS? MyChart    Are changes needed to the allergy or medication list? NO    Reason for visit: RECHECK and Video Visit

## 2023-08-16 NOTE — PROGRESS NOTES
Assessment/Plan :   Type 2 DM. Gato continues to work improving his blood sugars. He really feels like the Mounjaro is working well but he is unable to consistently get the medication. We discussed options, like switching to Trulicity, but he is worried about the cost. He is currently using a savings program to afford the Mounjaro. He would like to continue with the Mounjaro, for now. We also reviewed his current insulin dosing. Based on his blood sugars, he could use more Lantus. We discussed that it is not uncommon to feel worse, as we decrease blood sugars rapidly. His body is used to his blood sugars being in the 200s but we need to get them down. I would like him to try and increase the Lantus by a couple unit(s) every few days, until his morning blood sugar is at 150 mg/dl. If he has any problems, he can contact my office. He is also due for repeat laboratory testing. I will place a lab order today. We will plan on a follow-up appt in 3 mos.    Due to the COVID 19 pandemic this visit was a telephone/video visit in order to help prevent spread of infection in this patient and the general population. The patient gave verbal consent for the visit today. I have independently reviewed and interpreted labs, imaging as indicated.       Distant Location (provider location):  Off-site  Mode of Communication:  Video Conference via [employer]  Chart review/prep time 5    Joined the call at 8/16/2023, 9:33:28 am.  Left the call at 8/16/2023, 9:51:38 am.  You were on the call for 18 minutes 10 seconds .  26 minutes spent on the date of the encounter doing chart review, history and exam, documentation and further activities as noted above.      Chief complaint:  Miquel is a 58 year old male seen in consultation at the request of Dr MCKEON have reviewed Care Everywhere including [facility], [facility], [facility], [facility], [place], [facility], [facility] , [facility], [place] lab reports, imaging reports and provider  notes as indicated.      HISTORY OF PRESENT ILLNESS  Gato continues to work on improving his blood sugars. He is frustrated by his access to medication. He feels like the Mounjaro is working really well but it is not always available at the pharmacy. He has continued to take Lantus 38 unit(s) daily, Jardiance 25 mg daily, and metformin 2000 mg daily. He takes 5 mg of Mounjaro weekly when he is able to. He is currently monitoring his blood sugars with fingerstick testing.    Gato has not had any problems with severe hyperglycemia and/or hypoglycemia. He has noticed that his blood sugars are a little higher when he is off of the Mounjaro but he feels better, when they are elevated. He has not had any problems with blurred vision or numbness/tingling in his feet. He has noticed that his legs and feet will feel tight, on occasion. It is like the blood is pooling in his legs. However, he has not noticed an swelling, they just feel tight. The sensation improves with activity.     Gato has had diabetes for over 15 yrs. He has struggled to get his hemoglobin A1C to goal. He is motivated to get his numbers down, however, he also doesn't want to feel horrible. His previous provider tried to increase his Lantus to 60 unit(s) and Gato was shaky and felt worse, than ever before. He has not history of diabetic retinopathy. He does have a history of albuminuria.    Endocrine relevant labs are as follows:   Latest Reference Range & Units 11/25/22 11:24   Albumin Urine mg/g Cr 0.00 - 17.00 mg/g Cr 116.30 (H)   (H): Data is abnormally high     Latest Reference Range & Units 11/25/22 11:24   Albumin Urine mg/L mg/L 107      Latest Reference Range & Units 04/11/23 07:03   Hemoglobin A1C 0.0 - 5.6 % 9.2 (H)   (H): Data is abnormally high    REVIEW OF SYSTEMS  Answers submitted by the patient for this visit:  Symptoms you have experienced in the last 30 days (Submitted on 8/9/2023)  General Symptoms: No  Skin Symptoms: No  HENT  Symptoms: No  EYE SYMPTOMS: No  HEART SYMPTOMS: No  LUNG SYMPTOMS: No  INTESTINAL SYMPTOMS: No  URINARY SYMPTOMS: No  REPRODUCTIVE SYMPTOMS: No  SKELETAL SYMPTOMS: No  BLOOD SYMPTOMS: No  NERVOUS SYSTEM SYMPTOMS: No  MENTAL HEALTH SYMPTOMS: No    Endocrine: positive for, diabetes, and obesity  Skin: negative  Eyes: negative for, visual blurring, redness, tearing  Ears/Nose/Throat: negative for, nasal congestion  Respiratory: No shortness of breath, dyspnea on exertion, cough, or hemoptysis  Cardiovascular: negative for, chest pain, lower extremity edema, and exercise intolerance  Gastrointestinal: negative for, nausea, vomiting, constipation, and diarrhea  Genitourinary: negative for, nocturia, dysuria, frequency, and urgency  Musculoskeletal: positive for lower extremities feel tight on occasion, negative for, muscular weakness, and nocturnal cramping  Neurologic: negative for and numbness or tingling of feet  Psychiatric: negative  Hematologic/Lymphatic/Immunologic: negative    Past Medical History  Past Medical History:   Diagnosis Date    Arthritis     Background diabetic retinopathy, mild, ou 10/15/2022    Diabetes (H)     Hypertension        Medications  Current Outpatient Medications   Medication Sig Dispense Refill    insulin glargine (LANTUS PEN) 100 UNIT/ML pen Inject 40 Units Subcutaneous At Bedtime 45 mL 1    JARDIANCE 25 MG TABS tablet Take 1 tablet (25 mg) by mouth daily 90 tablet 0    lisinopril (ZESTRIL) 20 MG tablet Take 1 tablet (20 mg) by mouth daily 90 tablet 1    metFORMIN (GLUCOPHAGE XR) 500 MG 24 hr tablet Take 2 tablets (1,000 mg) by mouth 2 times daily (with meals) 360 tablet 1    rosuvastatin (CRESTOR) 10 MG tablet Take 1 tablet (10 mg) by mouth daily - stop atorvastatin 6/8/22 90 tablet 0    tirzepatide (MOUNJARO) 5 MG/0.5ML pen Inject 5 mg Subcutaneous every 7 days 6 mL 1       Allergies  Allergies   Allergen Reactions    Atorvastatin Rash       Family History  family history includes  Glaucoma in his maternal grandmother; Substance Abuse in his father.    Social History  Social History     Tobacco Use    Smoking status: Never     Passive exposure: Never    Smokeless tobacco: Never   Vaping Use    Vaping Use: Never used   Substance Use Topics    Alcohol use: Never    Drug use: Never       Physical Exam  Body mass index is 33.02 kg/m .  GENERAL: no distress  SKIN: Visible skin clear. No significant rash, abnormal pigmentation or lesions.  EYES: Eyes grossly normal to inspection.  No discharge or erythema, or obvious scleral/conjunctival abnormalities.  NECK: visible goiter is not present; no visible neck masses  RESP: No audible wheeze, cough, or visible cyanosis.  No visible retractions or increased work of breathing.    NEURO: Awake, alert, responds appropriately to questions.  Mentation and speech fluent.  PSYCH:affect normal and appearance well-groomed.      DATA REVIEW  Please see attached glucose logs

## 2023-08-19 ENCOUNTER — HEALTH MAINTENANCE LETTER (OUTPATIENT)
Age: 58
End: 2023-08-19

## 2023-11-20 ENCOUNTER — LAB (OUTPATIENT)
Dept: LAB | Facility: CLINIC | Age: 58
End: 2023-11-20
Payer: COMMERCIAL

## 2023-11-20 DIAGNOSIS — Z79.4 TYPE 2 DIABETES MELLITUS WITH HYPERGLYCEMIA, WITH LONG-TERM CURRENT USE OF INSULIN (H): ICD-10-CM

## 2023-11-20 DIAGNOSIS — E11.65 TYPE 2 DIABETES MELLITUS WITH HYPERGLYCEMIA, WITH LONG-TERM CURRENT USE OF INSULIN (H): ICD-10-CM

## 2023-11-20 LAB
ERYTHROCYTE [DISTWIDTH] IN BLOOD BY AUTOMATED COUNT: 13 % (ref 10–15)
HBA1C MFR BLD: 9.5 % (ref 0–5.6)
HCT VFR BLD AUTO: 47 % (ref 40–53)
HGB BLD-MCNC: 16.1 G/DL (ref 13.3–17.7)
MCH RBC QN AUTO: 28.7 PG (ref 26.5–33)
MCHC RBC AUTO-ENTMCNC: 34.3 G/DL (ref 31.5–36.5)
MCV RBC AUTO: 84 FL (ref 78–100)
PLATELET # BLD AUTO: 110 10E3/UL (ref 150–450)
RBC # BLD AUTO: 5.61 10E6/UL (ref 4.4–5.9)
WBC # BLD AUTO: 5.6 10E3/UL (ref 4–11)

## 2023-11-20 PROCEDURE — 83036 HEMOGLOBIN GLYCOSYLATED A1C: CPT

## 2023-11-20 PROCEDURE — 36415 COLL VENOUS BLD VENIPUNCTURE: CPT

## 2023-11-20 PROCEDURE — 84443 ASSAY THYROID STIM HORMONE: CPT

## 2023-11-20 PROCEDURE — 85027 COMPLETE CBC AUTOMATED: CPT

## 2023-11-20 PROCEDURE — 80053 COMPREHEN METABOLIC PANEL: CPT

## 2023-11-20 NOTE — PROGRESS NOTES
Outcome for 11/20/23 4:07 PM: [employer] message sent  Rosetta Parker CMA  Adult Endocrinology   [facility]

## 2023-11-21 LAB
ALBUMIN SERPL BCG-MCNC: 4.2 G/DL (ref 3.5–5.2)
ALP SERPL-CCNC: 61 U/L (ref 40–150)
ALT SERPL W P-5'-P-CCNC: 34 U/L (ref 0–70)
ANION GAP SERPL CALCULATED.3IONS-SCNC: 12 MMOL/L (ref 7–15)
AST SERPL W P-5'-P-CCNC: 36 U/L (ref 0–45)
BILIRUB SERPL-MCNC: 0.4 MG/DL
BUN SERPL-MCNC: 17.9 MG/DL (ref 6–20)
CALCIUM SERPL-MCNC: 9.1 MG/DL (ref 8.6–10)
CHLORIDE SERPL-SCNC: 100 MMOL/L (ref 98–107)
CREAT SERPL-MCNC: 0.78 MG/DL (ref 0.67–1.17)
DEPRECATED HCO3 PLAS-SCNC: 23 MMOL/L (ref 22–29)
EGFRCR SERPLBLD CKD-EPI 2021: >90 ML/MIN/1.73M2
GLUCOSE SERPL-MCNC: 299 MG/DL (ref 70–99)
POTASSIUM SERPL-SCNC: 4.1 MMOL/L (ref 3.4–5.3)
PROT SERPL-MCNC: 7.1 G/DL (ref 6.4–8.3)
SODIUM SERPL-SCNC: 135 MMOL/L (ref 135–145)
TSH SERPL DL<=0.005 MIU/L-ACNC: 1.68 UIU/ML (ref 0.3–4.2)

## 2023-11-24 ENCOUNTER — OFFICE VISIT (OUTPATIENT)
Dept: ENDOCRINOLOGY | Facility: CLINIC | Age: 58
End: 2023-11-24
Payer: COMMERCIAL

## 2023-11-24 VITALS
DIASTOLIC BLOOD PRESSURE: 99 MMHG | BODY MASS INDEX: 32.5 KG/M2 | RESPIRATION RATE: 18 BRPM | HEART RATE: 78 BPM | HEIGHT: 70 IN | OXYGEN SATURATION: 99 % | WEIGHT: 227 LBS | SYSTOLIC BLOOD PRESSURE: 174 MMHG

## 2023-11-24 DIAGNOSIS — R80.9 TYPE 2 DIABETES MELLITUS WITH MICROALBUMINURIA, WITH LONG-TERM CURRENT USE OF INSULIN (H): ICD-10-CM

## 2023-11-24 DIAGNOSIS — Z79.4 TYPE 2 DIABETES MELLITUS WITH MICROALBUMINURIA, WITH LONG-TERM CURRENT USE OF INSULIN (H): ICD-10-CM

## 2023-11-24 DIAGNOSIS — E11.29 TYPE 2 DIABETES MELLITUS WITH MICROALBUMINURIA, WITH LONG-TERM CURRENT USE OF INSULIN (H): ICD-10-CM

## 2023-11-24 DIAGNOSIS — Z79.4 TYPE 2 DIABETES MELLITUS WITH HYPERGLYCEMIA, WITH LONG-TERM CURRENT USE OF INSULIN (H): Primary | ICD-10-CM

## 2023-11-24 DIAGNOSIS — E11.65 TYPE 2 DIABETES MELLITUS WITH HYPERGLYCEMIA, WITH LONG-TERM CURRENT USE OF INSULIN (H): Primary | ICD-10-CM

## 2023-11-24 PROCEDURE — 99214 OFFICE O/P EST MOD 30 MIN: CPT | Performed by: PHYSICIAN ASSISTANT

## 2023-11-24 NOTE — PATIENT INSTRUCTIONS
[facility]-Department of Endocrinology  Diabetes Educators:   Brook Chung, RN and Kadi Sahu RN  Clinic Nurse: MIKE Rudolph  CMA's: Rosetta Velasquez and Rubén   EMT: [place]  Scheduling/Clinic phone number : [phone]   Clinic Fax: [phone]  On-Call Endocrine at the [facility] (after hours/weekends): [phone] option 4    Please call the number below to schedule your labs.  [facility] [phone]   [facility] [phone]   [place] [phone]   [facility]  [phone]   [facility] [phone]   [place] [phone]   [facility] [place] [place]) [phone]   [facility] Walk-In Only   [place] [phone]   [place] [phone]   [place] [phone]   [place] [phone]     Please reach out to the following centers to schedule your imaging appointment:  Imaging (DEXA, CT, MRI, XRAY)    [facility] ([facility], [facility], [place]) [phone]   [facility] ([place], [place]) [phone]   [facility] ([facility]) [phone]   [facility] ([facility]) [phone]     Appointment Reminders:  * Please bring meter with for staff to download  * If you are due ONLY for an A1C, it is scheduled with the nurse and will be done in clinic. You do not need to schedule a lab appointment. Fasting is not required for an A1C.  * Refill request should be submitted to your pharmacy. They will contact clinic for approval.

## 2023-11-24 NOTE — LETTER
11/24/2023         RE: Miquel Llanes  [address] [facility] [address]        Dear Colleague,    Thank you for referring your patient, Miquel Llanes, to the [facility]. Please see a copy of my visit note below.    Outcome for 11/20/23 4:07 PM: eziCONEX message sent  Rosetta Parker CMA  Adult Endocrinology   [facility]        Assessment/Plan :   Type 2 DM. Gato feels like he is doing okay. We discussed his recent hemoglobin A1C and we reviewed the importance of getting his blood sugars under better control. We also discussed some possible medication adjustments to help with his overall control. He doesn't want to go back on a CGMS device because he feels like he is just too active. He had trouble keeping the device attached and he feels like it is waste of money. He does need to monitor his blood sugars consistently and I am going to send in a prescription for a new glucometer. We also discussed increasing the Mounjaro but he is worried about worsening adverse effects. He would prefer to continue with 5 mg weekly. We will instead increase Lantus to 50 unit(s) daily. I also stressed the importance of taking this medication, every day. We will follow-up in 3 mos.  Osteoarthritis. We also had a long discussion regarding the importance of exercise in managing diabetes. He wishes that he could walk more but his knees have really been bothering him. I encouraged him to reach out to the orthopedic specialist. Gato doesn't qualify for surgery due to hyperglycemia but they may have recommendations for knee injections or physical therapy.      I have independently reviewed and interpreted labs, imaging as indicated.      Chief complaint:  Miquel is a 58 year old male who returns for follow-up of Type 2 DM.    I have reviewed Care Everywhere including [facility], [facility], [facility], [facility], [place], [facility], [facility] , [facility], [place] lab reports,  imaging reports and provider notes as indicated.      HISTORY OF PRESENT ILLNESS  Gato continues to work on improving his blood sugars. Overall, he feels like he is doing well. He takes Mounjaro 5 mg once weekly, along with metformin XR 2000 mg daily, Jardiance 25 mg daily, and Lantus 45 unit(s) daily. He has been monitoring his blood sugars with fingerstick testing. He is insistent that his blood sugars are lower on the days when he doesn't take any medications. This doesn't occur often but he does admit that he occasionally forgets his insulin. He has not had any recent problems with severe hyperglycemia and/or hypoglycemia. However, he does not trust his current [employer] glucometer. He states that it doesn't always work and he feels like the numbers fluctuate too much, to be accurate.    Gato has had diabetes for over 15 years. He has struggled to get his hemoglobin A1C under 9%. He denies any problems with blurred vision or worsening numbness/tingling in his feet. He does experience occasionally tingling in his toes but it always improves. He does struggle with pain in his knees. He has osteoarthritis and he was told that he needs a knee replacement. Unfortunately, his blood sugar was too high to go forward with surgery. His diabetes is complicated by obesity, CKD stage 1,  and hyperlipidemia.     Endocrine relevant labs are as follows:   Latest Reference Range & Units 11/20/23 16:09   Hemoglobin A1C 0.0 - 5.6 % 9.5 (H)   (H): Data is abnormally high   Latest Reference Range & Units 11/25/22 11:24   Albumin Urine mg/g Cr 0.00 - 17.00 mg/g Cr 116.30 (H)   (H): Data is abnormally high   Latest Reference Range & Units 11/25/22 11:24   Albumin Urine mg/L mg/L 107     REVIEW OF SYSTEMS    Endocrine: positive for diabetes and obesity  Skin: negative  Eyes: negative for, visual blurring, redness, tearing  Ears/Nose/Throat: negative  Respiratory: No shortness of breath, dyspnea on exertion, cough, or  hemoptysis  Cardiovascular: negative for, irregular heart beat, chest pain, lower extremity edema, and exercise intolerance  Gastrointestinal: positive for dyspepsia and abdominal pain, negative for, nausea, vomiting, constipation, and diarrhea  Genitourinary: negative for, nocturia, dysuria, frequency, and urgency  Musculoskeletal: positive for arthritis and joint stiffness, negative for, muscular weakness, nocturnal cramping, and foot pain  Neurologic: positive for numbness or tingling of feet, negative for, local weakness, and numbness or tingling of hands  Psychiatric: negative  Hematologic/Lymphatic/Immunologic: negative    Past Medical History  Past Medical History:   Diagnosis Date     Arthritis      Background diabetic retinopathy, mild, ou 10/15/2022     Diabetes (H)      Hypertension        Medications  Current Outpatient Medications   Medication Sig Dispense Refill     insulin glargine (LANTUS PEN) 100 UNIT/ML pen Inject 50 Units Subcutaneous At Bedtime 60 mL 3     JARDIANCE 25 MG TABS tablet Take 1 tablet (25 mg) by mouth daily 90 tablet 3     lisinopril (ZESTRIL) 20 MG tablet Take 1 tablet (20 mg) by mouth daily 90 tablet 1     metFORMIN (GLUCOPHAGE XR) 500 MG 24 hr tablet Take 2 tablets (1,000 mg) by mouth 2 times daily (with meals) 360 tablet 1     rosuvastatin (CRESTOR) 10 MG tablet Take 1 tablet (10 mg) by mouth daily - stop atorvastatin 6/8/22 90 tablet 0     tirzepatide (MOUNJARO) 5 MG/0.5ML pen Inject 5 mg Subcutaneous every 7 days 6 mL 1       Allergies  Allergies   Allergen Reactions     Atorvastatin Rash         Family History  family history includes Glaucoma in his maternal grandmother; Substance Abuse in his father.    Social History  Social History     Tobacco Use     Smoking status: Never     Passive exposure: Never     Smokeless tobacco: Never   Vaping Use     Vaping Use: Never used   Substance Use Topics     Alcohol use: Never     Drug use: Never       Physical Exam  BP (!) 174/99 (BP  "Location: Left arm, Patient Position: Sitting, Cuff Size: Adult Regular)   Pulse 78   Resp 18   Ht 1.778 m (5' 10\")   Wt 103 kg (227 lb)   SpO2 99%   BMI 32.57 kg/m    Body mass index is 32.57 kg/m .  GENERAL :  In no apparent distress  SKIN: Normal color, normal temperature, texture.  No hirsutism, alopecia or purple striae.     EYES: PERRL, EOMI, No scleral icterus,  No proptosis, conjunctival redness, stare, retraction  RESP: Lungs clear to auscultation bilaterally  CARDIAC: Regular rate and rhythm, normal S1 S2, without murmurs, rubs or gallops    NEURO: awake, alert, responds appropriately to questions.  Cranial nerves intact.   Moves all extremities; Gait normal.  No tremor of the outstretched hand.    EXTREMITIES: No clubbing, cyanosis or edema.  FOOT EXAM: Pedal pulses 3/4 bilaterally. No obvious signs of deformity or pre-ulcerative callus formation. Sensation intact to monofilament. Heels are dry with minimal cracking.      DATA REVIEW  Pt is unable to download [employer] glucometer  He also reports that his glucometer has not been functioning appropriately        Again, thank you for allowing me to participate in the care of your patient.        Sincerely,        Marylou Sawyer PA-C  "

## 2023-11-24 NOTE — PROGRESS NOTES
Assessment/Plan :   Type 2 DM. Gato feels like he is doing okay. We discussed his recent hemoglobin A1C and we reviewed the importance of getting his blood sugars under better control. We also discussed some possible medication adjustments to help with his overall control. He doesn't want to go back on a CGMS device because he feels like he is just too active. He had trouble keeping the device attached and he feels like it is waste of money. He does need to monitor his blood sugars consistently and I am going to send in a prescription for a new glucometer. We also discussed increasing the Mounjaro but he is worried about worsening adverse effects. He would prefer to continue with 5 mg weekly. We will instead increase Lantus to 50 unit(s) daily. I also stressed the importance of taking this medication, every day. We will follow-up in 3 mos.  Osteoarthritis. We also had a long discussion regarding the importance of exercise in managing diabetes. He wishes that he could walk more but his knees have really been bothering him. I encouraged him to reach out to the orthopedic specialist. Gato doesn't qualify for surgery due to hyperglycemia but they may have recommendations for knee injections or physical therapy.      I have independently reviewed and interpreted labs, imaging as indicated.      Chief complaint:  Miquel is a 58 year old male who returns for follow-up of Type 2 DM.    I have reviewed Care Everywhere including [facility], [facility], [facility], [facility], [facility] , [facility], [place] lab reports, imaging reports and provider notes as indicated.      HISTORY OF PRESENT ILLNESS  Gato continues to work on improving his blood sugars. Overall, he feels like he is doing well. He takes Mounjaro 5 mg once weekly, along with metformin XR 2000 mg daily, Jardiance 25 mg daily, and Lantus 45 unit(s) daily. He has been monitoring his blood sugars with fingerstick testing. He is insistent  that his blood sugars are lower on the days when he doesn't take any medications. This doesn't occur often but he does admit that he occasionally forgets his insulin. He has not had any recent problems with severe hyperglycemia and/or hypoglycemia. However, he does not trust his current [employer] glucometer. He states that it doesn't always work and he feels like the numbers fluctuate too much, to be accurate.    Gato has had diabetes for over 15 years. He has struggled to get his hemoglobin A1C under 9%. He denies any problems with blurred vision or worsening numbness/tingling in his feet. He does experience occasionally tingling in his toes but it always improves. He does struggle with pain in his knees. He has osteoarthritis and he was told that he needs a knee replacement. Unfortunately, his blood sugar was too high to go forward with surgery. His diabetes is complicated by obesity, CKD stage 1,  and hyperlipidemia.     Endocrine relevant labs are as follows:   Latest Reference Range & Units 11/20/23 16:09   Hemoglobin A1C 0.0 - 5.6 % 9.5 (H)   (H): Data is abnormally high   Latest Reference Range & Units 11/25/22 11:24   Albumin Urine mg/g Cr 0.00 - 17.00 mg/g Cr 116.30 (H)   (H): Data is abnormally high   Latest Reference Range & Units 11/25/22 11:24   Albumin Urine mg/L mg/L 107     REVIEW OF SYSTEMS    Endocrine: positive for diabetes and obesity  Skin: negative  Eyes: negative for, visual blurring, redness, tearing  Ears/Nose/Throat: negative  Respiratory: No shortness of breath, dyspnea on exertion, cough, or hemoptysis  Cardiovascular: negative for, irregular heart beat, chest pain, lower extremity edema, and exercise intolerance  Gastrointestinal: positive for dyspepsia and abdominal pain, negative for, nausea, vomiting, constipation, and diarrhea  Genitourinary: negative for, nocturia, dysuria, frequency, and urgency  Musculoskeletal: positive for arthritis and joint stiffness, negative for, muscular  "weakness, nocturnal cramping, and foot pain  Neurologic: positive for numbness or tingling of feet, negative for, local weakness, and numbness or tingling of hands  Psychiatric: negative  Hematologic/Lymphatic/Immunologic: negative    Past Medical History  Past Medical History:   Diagnosis Date    Arthritis     Background diabetic retinopathy, mild, ou 10/15/2022    Diabetes (H)     Hypertension        Medications  Current Outpatient Medications   Medication Sig Dispense Refill    insulin glargine (LANTUS PEN) 100 UNIT/ML pen Inject 50 Units Subcutaneous At Bedtime 60 mL 3    JARDIANCE 25 MG TABS tablet Take 1 tablet (25 mg) by mouth daily 90 tablet 3    lisinopril (ZESTRIL) 20 MG tablet Take 1 tablet (20 mg) by mouth daily 90 tablet 1    metFORMIN (GLUCOPHAGE XR) 500 MG 24 hr tablet Take 2 tablets (1,000 mg) by mouth 2 times daily (with meals) 360 tablet 1    rosuvastatin (CRESTOR) 10 MG tablet Take 1 tablet (10 mg) by mouth daily - stop atorvastatin 6/8/22 90 tablet 0    tirzepatide (MOUNJARO) 5 MG/0.5ML pen Inject 5 mg Subcutaneous every 7 days 6 mL 1       Allergies  Allergies   Allergen Reactions    Atorvastatin Rash         Family History  family history includes Glaucoma in his maternal grandmother; Substance Abuse in his father.    Social History  Social History     Tobacco Use    Smoking status: Never     Passive exposure: Never    Smokeless tobacco: Never   Vaping Use    Vaping Use: Never used   Substance Use Topics    Alcohol use: Never    Drug use: Never       Physical Exam  BP (!) 174/99 (BP Location: Left arm, Patient Position: Sitting, Cuff Size: Adult Regular)   Pulse 78   Resp 18   Ht 1.778 m (5' 10\")   Wt 103 kg (227 lb)   SpO2 99%   BMI 32.57 kg/m    Body mass index is 32.57 kg/m .  GENERAL :  In no apparent distress  SKIN: Normal color, normal temperature, texture.  No hirsutism, alopecia or purple striae.     EYES: PERRL, EOMI, No scleral icterus,  No proptosis, conjunctival redness, " stare, retraction  RESP: Lungs clear to auscultation bilaterally  CARDIAC: Regular rate and rhythm, normal S1 S2, without murmurs, rubs or gallops    NEURO: awake, alert, responds appropriately to questions.  Cranial nerves intact.   Moves all extremities; Gait normal.  No tremor of the outstretched hand.    EXTREMITIES: No clubbing, cyanosis or edema.  FOOT EXAM: Pedal pulses 3/4 bilaterally. No obvious signs of deformity or pre-ulcerative callus formation. Sensation intact to monofilament. Heels are dry with minimal cracking.      DATA REVIEW  Pt is unable to download [employer] glucometer  He also reports that his glucometer has not been functioning appropriately

## 2023-11-24 NOTE — NURSING NOTE
"Miquel Llanes's goals for this visit include:   Chief Complaint   Patient presents with    Follow Up     DMII     He requests these members of his care team be copied on today's visit information: yes    PCP: Ana Maria Valentin    Referring Provider:  No referring provider defined for this encounter.    BP (!) 174/99 (BP Location: Left arm, Patient Position: Sitting, Cuff Size: Adult Regular)   Pulse 78   Resp 18   Ht 1.778 m (5' 10\")   Wt 103 kg (227 lb)   SpO2 99%   BMI 32.57 kg/m      Do you need any medication refills at today's visit? Yes    Lester Marrero, EMT      "

## 2024-01-24 ENCOUNTER — TELEPHONE (OUTPATIENT)
Dept: FAMILY MEDICINE | Facility: CLINIC | Age: 59
End: 2024-01-24
Payer: COMMERCIAL

## 2024-01-24 NOTE — TELEPHONE ENCOUNTER
Patient Quality Outreach    Patient is due for the following:   Diabetes -  LDL (Fasting), Eye Exam, and Foot Exam  Colon Cancer Screening  Physical Annual Wellness Visit      Topic Date Due    Flu Vaccine (1) 09/01/2023    COVID-19 Vaccine (5 - 2023-24 season) 09/01/2023       Next Steps:   Schedule a Annual Wellness Visit    Type of outreach:    Phone, spoke to patient/parent. Made appointment for 3/1/24      Questions for provider review:    None           Funmilayo Wolf, CMA

## 2024-02-26 SDOH — HEALTH STABILITY: PHYSICAL HEALTH: ON AVERAGE, HOW MANY DAYS PER WEEK DO YOU ENGAGE IN MODERATE TO STRENUOUS EXERCISE (LIKE A BRISK WALK)?: 3 DAYS

## 2024-02-26 SDOH — HEALTH STABILITY: PHYSICAL HEALTH: ON AVERAGE, HOW MANY MINUTES DO YOU ENGAGE IN EXERCISE AT THIS LEVEL?: 30 MIN

## 2024-02-26 ASSESSMENT — SOCIAL DETERMINANTS OF HEALTH (SDOH): HOW OFTEN DO YOU GET TOGETHER WITH FRIENDS OR RELATIVES?: THREE TIMES A WEEK

## 2024-03-01 ENCOUNTER — OFFICE VISIT (OUTPATIENT)
Dept: FAMILY MEDICINE | Facility: CLINIC | Age: 59
End: 2024-03-01
Payer: COMMERCIAL

## 2024-03-01 VITALS
HEART RATE: 86 BPM | SYSTOLIC BLOOD PRESSURE: 142 MMHG | RESPIRATION RATE: 18 BRPM | TEMPERATURE: 97.2 F | OXYGEN SATURATION: 97 % | BODY MASS INDEX: 33.82 KG/M2 | DIASTOLIC BLOOD PRESSURE: 86 MMHG | WEIGHT: 236.2 LBS | HEIGHT: 70 IN

## 2024-03-01 DIAGNOSIS — N18.1 CHRONIC KIDNEY DISEASE, STAGE 1: ICD-10-CM

## 2024-03-01 DIAGNOSIS — M17.0 PRIMARY OSTEOARTHRITIS OF BOTH KNEES: ICD-10-CM

## 2024-03-01 DIAGNOSIS — R79.89 ABNORMAL CBC: ICD-10-CM

## 2024-03-01 DIAGNOSIS — E11.3299 BACKGROUND DIABETIC RETINOPATHY (H): ICD-10-CM

## 2024-03-01 DIAGNOSIS — E11.29 TYPE 2 DIABETES MELLITUS WITH MICROALBUMINURIA, WITH LONG-TERM CURRENT USE OF INSULIN (H): Primary | ICD-10-CM

## 2024-03-01 DIAGNOSIS — R80.9 TYPE 2 DIABETES MELLITUS WITH MICROALBUMINURIA, WITH LONG-TERM CURRENT USE OF INSULIN (H): Primary | ICD-10-CM

## 2024-03-01 DIAGNOSIS — J32.9 CHRONIC CONGESTION OF PARANASAL SINUS: ICD-10-CM

## 2024-03-01 DIAGNOSIS — Z79.4 TYPE 2 DIABETES MELLITUS WITH MICROALBUMINURIA, WITH LONG-TERM CURRENT USE OF INSULIN (H): Primary | ICD-10-CM

## 2024-03-01 DIAGNOSIS — I10 ESSENTIAL HYPERTENSION: ICD-10-CM

## 2024-03-01 LAB
HBA1C MFR BLD: 9.5 % (ref 0–5.6)
PLATELET # BLD AUTO: 103 10E3/UL (ref 150–450)

## 2024-03-01 PROCEDURE — 82570 ASSAY OF URINE CREATININE: CPT | Performed by: PHYSICIAN ASSISTANT

## 2024-03-01 PROCEDURE — 83036 HEMOGLOBIN GLYCOSYLATED A1C: CPT | Performed by: PHYSICIAN ASSISTANT

## 2024-03-01 PROCEDURE — 85049 AUTOMATED PLATELET COUNT: CPT | Performed by: PHYSICIAN ASSISTANT

## 2024-03-01 PROCEDURE — 82043 UR ALBUMIN QUANTITATIVE: CPT | Performed by: PHYSICIAN ASSISTANT

## 2024-03-01 PROCEDURE — 36415 COLL VENOUS BLD VENIPUNCTURE: CPT | Performed by: PHYSICIAN ASSISTANT

## 2024-03-01 PROCEDURE — 80061 LIPID PANEL: CPT | Performed by: PHYSICIAN ASSISTANT

## 2024-03-01 PROCEDURE — 99396 PREV VISIT EST AGE 40-64: CPT | Performed by: PHYSICIAN ASSISTANT

## 2024-03-01 PROCEDURE — 99214 OFFICE O/P EST MOD 30 MIN: CPT | Mod: 25 | Performed by: PHYSICIAN ASSISTANT

## 2024-03-01 RX ORDER — METFORMIN HCL 500 MG
1000 TABLET, EXTENDED RELEASE 24 HR ORAL 2 TIMES DAILY WITH MEALS
Qty: 360 TABLET | Refills: 1 | Status: SHIPPED | OUTPATIENT
Start: 2024-03-01 | End: 2024-07-05

## 2024-03-01 RX ORDER — LISINOPRIL 20 MG/1
20 TABLET ORAL DAILY
Qty: 90 TABLET | Refills: 3 | Status: SHIPPED | OUTPATIENT
Start: 2024-03-01 | End: 2024-03-01

## 2024-03-01 RX ORDER — ROSUVASTATIN CALCIUM 10 MG/1
10 TABLET, COATED ORAL DAILY
Qty: 90 TABLET | Refills: 3 | Status: SHIPPED | OUTPATIENT
Start: 2024-03-01 | End: 2024-03-13 | Stop reason: SINTOL

## 2024-03-01 RX ORDER — LISINOPRIL 20 MG/1
20 TABLET ORAL DAILY
Qty: 90 TABLET | Refills: 3 | Status: SHIPPED | OUTPATIENT
Start: 2024-03-01

## 2024-03-01 NOTE — PATIENT INSTRUCTIONS
Glucosamine chondroitin - supplement for joint pain and joint health    Stop lisinopril - this could be causing a dry cough.  Switch to triamterene/hydrochlorothiazide.   Schedule a lab appointment in 2 weeks to recheck kidney function and electrolytes.

## 2024-03-01 NOTE — PROGRESS NOTES
"Preventive Care Visit  [facility] SANIYA Valentin PA-C, Family Medicine  Mar 1, 2024    Assessment & Plan       ICD-10-CM    1. Type 2 diabetes mellitus with microalbuminuria, with long-term current use of insulin (H)  E11.29 metFORMIN (GLUCOPHAGE XR) 500 MG 24 hr tablet    R80.9 rosuvastatin (CRESTOR) 10 MG tablet    Z79.4 dulaglutide (TRULICITY) 0.75 MG/0.5ML pen     lisinopril (ZESTRIL) 20 MG tablet     DISCONTINUED: lisinopril (ZESTRIL) 20 MG tablet      2. Background diabetic retinopathy (H)  E11.3299 Lipid panel reflex to direct LDL Non-fasting     HEMOGLOBIN A1C     Lipid panel reflex to direct LDL Non-fasting     HEMOGLOBIN A1C      3. Chronic kidney disease, stage 1  N18.1 Albumin Random Urine Quantitative with Creat Ratio     Albumin Random Urine Quantitative with Creat Ratio      4. Essential hypertension  I10 lisinopril (ZESTRIL) 20 MG tablet     DISCONTINUED: lisinopril (ZESTRIL) 20 MG tablet      5. Abnormal CBC  R79.89 Platelet count     Platelet count      6. Primary osteoarthritis of both knees  M17.0 Orthopedic  Referral      7. Chronic congestion of paranasal sinus  J32.9 CT Sinus w/o Contrast          1-4) Routine labs in process. Follow up pending results. Will see if insurance will cover Trulicity, prescription sent to pharmacy. Will recheck his blood pressure the next time he's in for labs. Meds renewed, no changes today.    5) Will recheck platelets today    6) Referral to orthopedics    7) Will check a sinus CT for further evaluation. Follow up pending results. May refer to ENT.       Ordering of each unique test  Prescription drug management    BMI  Estimated body mass index is 33.89 kg/m  as calculated from the following:    Height as of this encounter: 1.778 m (5' 10\").    Weight as of this encounter: 107.1 kg (236 lb 3.2 oz).     Counseling  Appropriate preventive services were discussed with this patient, including applicable screening as appropriate " for fall prevention, nutrition, physical activity, Tobacco-use cessation, weight loss and cognition.  Checklist reviewing preventive services available has been given to the patient.  Reviewed patient's diet, addressing concerns and/or questions.   He is at risk for lack of exercise and has been provided with information to increase physical activity for the benefit of his well-being.   He is at risk for psychosocial distress and has been provided with information to reduce risk.     Return for follow up pending lab and/or imaging results.          Rica Hoskins is a 59 year old, presenting for the following:  Physical (fasting)        3/1/2024    10:18 AM   Additional Questions   Roomed by Funmilayo   Accompanied by linus         3/1/2024    10:18 AM   Patient Reported Additional Medications   Patient reports taking the following new medications none        Via the Health Maintenance questionnaire, the patient has reported the following services have been completed -Eye Exam-Colonscopy, this information has been sent to the abstraction team.  Health Care Directive  Patient does not have a Health Care Directive or Living Will: Discussed advance care planning with patient; information given to patient to review.    Healthy Habits:     Taking medications regularly:  0  History of Present Illness       Diabetes:   He presents for follow up of diabetes.  He is checking home blood glucose one time daily.   He checks blood glucose before meals.  Blood glucose is never over 200 and never under 70. He is aware of hypoglycemia symptoms including none.   He is concerned about other (burning sensation on feet and legs).   He is having numbness in feet and burning in feet.  The patient has not had a diabetic eye exam in the last 12 months.          Hyperlipidemia:  He presents for follow up of hyperlipidemia.   He is taking medication to lower cholesterol. He is not having myalgia or other side effects to statin  medications.    Hypertension: He presents for follow up of hypertension.  He does not check blood pressure  regularly outside of the clinic.  He follows a low salt diet.     He eats 0-1 servings of fruits and vegetables daily.He consumes 1 sweetened beverage(s) daily.He exercises with enough effort to increase his heart rate 30 to 60 minutes per day.  He exercises with enough effort to increase his heart rate 3 or less days per week.   He is taking medications regularly.    ADDENDUM:  First at night when head to sleep my nasels get super plugged for seemingly no reason I can figure out.  For most my life I'm always clearing my throat or cough from a benjamin tickle in throat when cooler air is taken in benjamin in lungs like fluid sits at lower throat.  Also feels like phlegm or whatever is in my vocal cords alot and it can cause loss of voice till I get it cleared out.          2/26/2024   General Health   How would you rate your overall physical health? Good   Feel stress (tense, anxious, or unable to sleep) Only a little   (!) STRESS CONCERN      2/26/2024   Nutrition   Three or more servings of calcium each day? (!) I DON'T KNOW   Diet: Diabetic    Carbohydrate counting    Other   If other, please elaborate: Eat a certain meal every day   How many servings of fruit and vegetables per day? (!) 0-1   How many sweetened beverages each day? 0-1         2/26/2024   Exercise   Days per week of moderate/strenous exercise 3 days   Average minutes spent exercising at this level 30 min         2/26/2024   Social Factors   Frequency of gathering with friends or relatives Three times a week   Worry food won't last until get money to buy more No   Food not last or not have enough money for food? No   Do you have housing?  Yes   Are you worried about losing your housing? No   Lack of transportation? No   Unable to get utilities (heat,electricity)? No         2/26/2024   Fall Risk   Fallen 2 or more times in the past year? No  "  Trouble with walking or balance? No          2/26/2024   Dental   Dentist two times every year? Yes         2/26/2024   TB Screening   Were you born outside of [place]?  No         Today's PHQ-2 Score:       3/1/2024     9:41 AM   PHQ-2 ( 1999 [employer])   Q1: Little interest or pleasure in doing things 0   Q2: Feeling down, depressed or hopeless 0   PHQ-2 Score 0   Q1: Little interest or pleasure in doing things Not at all   Q2: Feeling down, depressed or hopeless Not at all   PHQ-2 Score 0           2/26/2024   Substance Use   Alcohol more than 3/day or more than 7/wk Not Applicable   Do you use any other substances recreationally? No     Social History     Tobacco Use    Smoking status: Never     Passive exposure: Never    Smokeless tobacco: Never   Vaping Use    Vaping Use: Never used   Substance Use Topics    Alcohol use: Never    Drug use: Never           2/26/2024   STI Screening   New sexual partner(s) since last STI/HIV test? No   Last PSA:   PSA   Date Value Ref Range Status   04/14/2021 0.44 0 - 4 ug/L Final     Comment:     Assay Method:  Chemiluminescence using Siemens Vista analyzer     ASCVD Risk   The ASCVD Risk score (Rickey MORENO, et al., 2019) failed to calculate for the following reasons:    The valid total cholesterol range is 130 to 320 mg/dL         Reviewed and updated as needed this visit by Provider                        Review of Systems  Constitutional, neuro, ENT, endocrine, pulmonary, cardiac, gastrointestinal, genitourinary, musculoskeletal, integument and psychiatric systems are negative, except as otherwise noted.     Objective    Exam  BP (!) 142/86   Pulse 86   Temp 97.2  F (36.2  C) (Temporal)   Resp 18   Ht 1.778 m (5' 10\")   Wt 107.1 kg (236 lb 3.2 oz)   SpO2 97%   BMI 33.89 kg/m     Estimated body mass index is 33.89 kg/m  as calculated from the following:    Height as of this encounter: 1.778 m (5' 10\").    Weight as of this encounter: 107.1 kg (236 lb 3.2 " oz).    Physical Exam  GENERAL: alert, no distress, and obese  EYES: Eyes grossly normal to inspection  HENT: ear canals and TM's normal, nose and mouth without ulcers or lesions  NECK: no adenopathy, no asymmetry, masses, or scars  RESP: lungs clear to auscultation - no rales, rhonchi or wheezes  CV: regular rates and rhythm, normal S1 S2, no S3 or S4, and no murmur, click or rub  ABDOMEN: soft, nontender, no hepatosplenomegaly, no masses and bowel sounds normal  MS: no gross musculoskeletal defects noted, no edema  SKIN: no suspicious lesions or rashes  NEURO: Normal strength and tone, mentation intact and speech normal  PSYCH: mentation appears normal, affect normal/bright      Signed Electronically by: Ana Maria Valentin PA-C

## 2024-03-02 LAB
CHOLEST SERPL-MCNC: 131 MG/DL
CREAT UR-MCNC: 32.3 MG/DL
FASTING STATUS PATIENT QL REPORTED: YES
HDLC SERPL-MCNC: 33 MG/DL
LDLC SERPL CALC-MCNC: 66 MG/DL
MICROALBUMIN UR-MCNC: 31.1 MG/L
MICROALBUMIN/CREAT UR: 96.28 MG/G CR (ref 0–17)
NONHDLC SERPL-MCNC: 98 MG/DL
TRIGL SERPL-MCNC: 161 MG/DL

## 2024-03-04 PROBLEM — D69.6 THROMBOCYTOPENIA (H): Status: ACTIVE | Noted: 2024-03-04

## 2024-03-12 ENCOUNTER — MYC MEDICAL ADVICE (OUTPATIENT)
Dept: FAMILY MEDICINE | Facility: CLINIC | Age: 59
End: 2024-03-12
Payer: COMMERCIAL

## 2024-03-12 DIAGNOSIS — R80.9 TYPE 2 DIABETES MELLITUS WITH MICROALBUMINURIA, WITH LONG-TERM CURRENT USE OF INSULIN (H): Primary | ICD-10-CM

## 2024-03-12 DIAGNOSIS — Z79.4 TYPE 2 DIABETES MELLITUS WITH MICROALBUMINURIA, WITH LONG-TERM CURRENT USE OF INSULIN (H): Primary | ICD-10-CM

## 2024-03-12 DIAGNOSIS — E11.29 TYPE 2 DIABETES MELLITUS WITH MICROALBUMINURIA, WITH LONG-TERM CURRENT USE OF INSULIN (H): Primary | ICD-10-CM

## 2024-03-13 DIAGNOSIS — E11.29 TYPE 2 DIABETES MELLITUS WITH MICROALBUMINURIA, WITH LONG-TERM CURRENT USE OF INSULIN (H): ICD-10-CM

## 2024-03-13 DIAGNOSIS — Z79.4 TYPE 2 DIABETES MELLITUS WITH MICROALBUMINURIA, WITH LONG-TERM CURRENT USE OF INSULIN (H): ICD-10-CM

## 2024-03-13 DIAGNOSIS — R80.9 TYPE 2 DIABETES MELLITUS WITH MICROALBUMINURIA, WITH LONG-TERM CURRENT USE OF INSULIN (H): ICD-10-CM

## 2024-03-13 RX ORDER — SIMVASTATIN 20 MG
20 TABLET ORAL AT BEDTIME
Qty: 30 TABLET | Refills: 0 | Status: SHIPPED | OUTPATIENT
Start: 2024-03-13 | End: 2024-05-30

## 2024-03-14 RX ORDER — SIMVASTATIN 20 MG
20 TABLET ORAL AT BEDTIME
Qty: 90 TABLET | Refills: 0 | Status: CANCELLED | OUTPATIENT
Start: 2024-03-14

## 2024-03-14 NOTE — TELEPHONE ENCOUNTER
Notified patient.    Patient stated understanding and agreeable with the plan of care.     Rebeca PABONN RN  Triage Nurse  [facility]

## 2024-03-15 ENCOUNTER — ANCILLARY PROCEDURE (OUTPATIENT)
Dept: GENERAL RADIOLOGY | Facility: CLINIC | Age: 59
End: 2024-03-15
Attending: FAMILY MEDICINE
Payer: COMMERCIAL

## 2024-03-15 ENCOUNTER — OFFICE VISIT (OUTPATIENT)
Dept: ORTHOPEDICS | Facility: CLINIC | Age: 59
End: 2024-03-15
Attending: PHYSICIAN ASSISTANT
Payer: COMMERCIAL

## 2024-03-15 VITALS
WEIGHT: 236 LBS | BODY MASS INDEX: 33.79 KG/M2 | HEART RATE: 91 BPM | DIASTOLIC BLOOD PRESSURE: 91 MMHG | SYSTOLIC BLOOD PRESSURE: 175 MMHG | HEIGHT: 70 IN

## 2024-03-15 DIAGNOSIS — M17.0 PRIMARY OSTEOARTHRITIS OF BOTH KNEES: ICD-10-CM

## 2024-03-15 DIAGNOSIS — M25.561 CHRONIC PAIN OF BOTH KNEES: Primary | ICD-10-CM

## 2024-03-15 DIAGNOSIS — G89.29 CHRONIC PAIN OF BOTH KNEES: Primary | ICD-10-CM

## 2024-03-15 DIAGNOSIS — M25.562 CHRONIC PAIN OF BOTH KNEES: Primary | ICD-10-CM

## 2024-03-15 PROCEDURE — 73562 X-RAY EXAM OF KNEE 3: CPT | Mod: TC | Performed by: RADIOLOGY

## 2024-03-15 PROCEDURE — 99204 OFFICE O/P NEW MOD 45 MIN: CPT | Performed by: FAMILY MEDICINE

## 2024-03-15 NOTE — PROGRESS NOTES
"Miquel Llanes  :  1965  DOS: 3/15/2024  MRN: [id number]    Sports Medicine Clinic Visit    PCP: Ana Maria Valentin Rosa    Miquel Llanes is a 59 year old male who is seen in consultation at the request of  Ana Maria Valentin PA-C presenting with bilateral knee pain.     Injury: Reports insidious onset without acute precipitating event that patient first noticed approximately several year(s).  Pain located over bilateral anterior.  Additional Features:  Positive: grinding, swelling and popping.  Symptoms are better with Rest and Sitting.  Symptoms are worse with: uneven terrain.  Other evaluation and/or treatments so far consists of: Ice, Heat, Tylenol, Ibuprofen, and previous steroid injections.  Recent imaging completed: No recent imaging completed.  Prior History of related problems: bilateral knee scopes several years ago     Social History:   for 23 years, likes to hike     Review of Systems  Musculoskeletal: as above  Remainder of review of systems is negative including constitutional, CV, pulmonary, GI, Skin and Neurologic except as noted in HPI or medical history.    Past Medical History:   Diagnosis Date    Arthritis     Background diabetic retinopathy, mild, ou 10/15/2022    Diabetes (H)     Hypertension      Past Surgical History:   Procedure Laterality Date    ORTHOPEDIC SURGERY       Family History   Problem Relation Age of Onset    Substance Abuse Father     Glaucoma Maternal Grandmother        Objective  BP (!) 175/91   Pulse 91   Ht 1.778 m (5' 10\")   Wt 107 kg (236 lb)   BMI 33.86 kg/m      General: healthy, alert and in no distress    HEENT: no scleral icterus or conjunctival erythema   Skin: no suspicious lesions or rash. No jaundice.   CV: regular rhythm by palpation, 2+ distal pulses, no pedal edema    Resp: normal respiratory effort without conversational dyspnea   Psych: normal mood and affect    Gait: antalgic, appropriate coordination and balance   Neuro: normal light " touch sensory exam of the extremities. Motor strength as noted below     Bilateral Knee exam    ROM:        Full active and passive ROM with flexion and extension       Mild pain with terminal flexion >> extension    Inspection:       no visible ecchymosis        effusion noted small bilateral by palpation       Genu varus    Skin:       no visible deformities       well perfused       capillary refill brisk    Patellar Motion:        Normal patellar tracking noted through range of motion    Tender:        lateral patellar border       medial joint line       lateral joint line    Non Tender:         remainder of knee area    Special Tests:        neg (-) varus at 0 deg and 30 deg       neg (-) valgus at 0 deg and 30 deg    Evaluation of ipsilateral kinetic chain       baseline strength with hip extension and abduction      Radiology  Recent Results (from the past 744 hour(s))   XR Knee Standing Bilateral 3 Views    Narrative    KNEE STANDING BILATERAL THREE VIEWS March 15, 2024 10:59 AM    INDICATION: Primary osteoarthritis of both knees.    COMPARISON: None available.       Impression    IMPRESSION: Severe bilateral medial compartment predominant,  tricompartmental knee osteoarthritis. Bone-on-bone abutment in the  medial compartments of each knee with lateral tibial translation. No  acute displaced knee fracture or dislocation. No anterior knee soft  tissue swelling. Small bilateral knee joint effusions. Marginal  spurring in all three compartments of each knee. Arterial  calcification.       Assessment:  1. Chronic pain of both knees    2. Primary osteoarthritis of both knees        Plan:  Discussed the assessment with the patient.  Follow up: 1 week for likely b/l viscosupplementation injections  Severe bilateral knee DJD, reviewed today  XR images independently visualized and reviewed with patient today in clinic  Has had CSI in the past, helped for a short period of time only, a few weeks  Prefer to avoid CSI  wherever possible given relatively uncontrolled type 2 diabetes  Has seen orthopedic surgery in the past we discussed TKA options although his hemoglobin A1c would need to be less than 8.5, currently 9.5  From injection standpoint for further hemoglobin A1c to be less than 9, reviewed today  Preauthorization placed for viscosupplementation trial which will not affect daily blood sugars  Reviewed wt loss, activity modification and progressive increase in activity as tolerated and guided by pain  Reviewed bracing options and low impact activity strategies, physical therapy referral available anytime, declined for now  Reviewed options for potential steroid vs viscosupplementation injections and the possibility for future orthopedic referral prn  Reviewed safe and appropriate OTC medication choices, try tylenol first  Up to 3000mg daily of tylenol is generally safe, NSAID dosing and duration limitations reviewed  Discussed nature of degenerative arthrosis of the knee.   Discussed symptom treatment with ice or heat, topical treatments, and rest if needed.   Supportive care reviewed  All questions were answered today  Contact us with additional questions or concerns  Signs and sx of concern reviewed    Thanks very much for sending this nice gentleman to us, I will keep you updated with his progress      Fredy Liang DO, [facility]  Sports Medicine Physician  [facility]              Disclaimer: This note consists of symbols derived from keyboarding, dictation and/or voice recognition software. As a result, there may be errors in the script that have gone undetected. Please consider this when interpreting information found in this chart.

## 2024-03-15 NOTE — LETTER
"    3/15/2024         RE: Miquel Llanes  [address] [address]        Dear Colleague,    Thank you for referring your patient, Miquel Llanes, to the [facility]. Please see a copy of my visit note below.    Miquel Llanes  :  1965  DOS: 3/15/2024  MRN: [id number]    Sports Medicine Clinic Visit    PCP: Ana Maria Valentin    Miquel Llanes is a 59 year old male who is seen in consultation at the request of  Ana Maria Valentin PA-C presenting with bilateral knee pain.     Injury: Reports insidious onset without acute precipitating event that patient first noticed approximately several year(s).  Pain located over bilateral anterior.  Additional Features:  Positive: grinding, swelling and popping.  Symptoms are better with Rest and Sitting.  Symptoms are worse with: uneven terrain.  Other evaluation and/or treatments so far consists of: Ice, Heat, Tylenol, Ibuprofen, and previous steroid injections.  Recent imaging completed: No recent imaging completed.  Prior History of related problems: bilateral knee scopes several years ago     Social History:   for 23 years, likes to hike     Review of Systems  Musculoskeletal: as above  Remainder of review of systems is negative including constitutional, CV, pulmonary, GI, Skin and Neurologic except as noted in HPI or medical history.    Past Medical History:   Diagnosis Date     Arthritis      Background diabetic retinopathy, mild, ou 10/15/2022     Diabetes (H)      Hypertension      Past Surgical History:   Procedure Laterality Date     ORTHOPEDIC SURGERY       Family History   Problem Relation Age of Onset     Substance Abuse Father      Glaucoma Maternal Grandmother        Objective  BP (!) 175/91   Pulse 91   Ht 1.778 m (5' 10\")   Wt 107 kg (236 lb)   BMI 33.86 kg/m      General: healthy, alert and in no distress    HEENT: no scleral icterus or conjunctival erythema   Skin: no suspicious " lesions or rash. No jaundice.   CV: regular rhythm by palpation, 2+ distal pulses, no pedal edema    Resp: normal respiratory effort without conversational dyspnea   Psych: normal mood and affect    Gait: antalgic, appropriate coordination and balance   Neuro: normal light touch sensory exam of the extremities. Motor strength as noted below     Bilateral Knee exam    ROM:        Full active and passive ROM with flexion and extension       Mild pain with terminal flexion >> extension    Inspection:       no visible ecchymosis        effusion noted small bilateral by palpation       Genu varus    Skin:       no visible deformities       well perfused       capillary refill brisk    Patellar Motion:        Normal patellar tracking noted through range of motion    Tender:        lateral patellar border       medial joint line       lateral joint line    Non Tender:         remainder of knee area    Special Tests:        neg (-) varus at 0 deg and 30 deg       neg (-) valgus at 0 deg and 30 deg    Evaluation of ipsilateral kinetic chain       baseline strength with hip extension and abduction      Radiology  Recent Results (from the past 744 hour(s))   XR Knee Standing Bilateral 3 Views    Narrative    KNEE STANDING BILATERAL THREE VIEWS March 15, 2024 10:59 AM    INDICATION: Primary osteoarthritis of both knees.    COMPARISON: None available.       Impression    IMPRESSION: Severe bilateral medial compartment predominant,  tricompartmental knee osteoarthritis. Bone-on-bone abutment in the  medial compartments of each knee with lateral tibial translation. No  acute displaced knee fracture or dislocation. No anterior knee soft  tissue swelling. Small bilateral knee joint effusions. Marginal  spurring in all three compartments of each knee. Arterial  calcification.       Assessment:  1. Chronic pain of both knees    2. Primary osteoarthritis of both knees        Plan:  Discussed the assessment with the patient.  Follow up:  1 week for likely b/l viscosupplementation injections  Severe bilateral knee DJD, reviewed today  XR images independently visualized and reviewed with patient today in clinic  Has had CSI in the past, helped for a short period of time only, a few weeks  Prefer to avoid CSI wherever possible given relatively uncontrolled type 2 diabetes  Has seen orthopedic surgery in the past we discussed TKA options although his hemoglobin A1c would need to be less than 8.5, currently 9.5  From injection standpoint for further hemoglobin A1c to be less than 9, reviewed today  Preauthorization placed for viscosupplementation trial which will not affect daily blood sugars  Reviewed wt loss, activity modification and progressive increase in activity as tolerated and guided by pain  Reviewed bracing options and low impact activity strategies, physical therapy referral available anytime, declined for now  Reviewed options for potential steroid vs viscosupplementation injections and the possibility for future orthopedic referral prn  Reviewed safe and appropriate OTC medication choices, try tylenol first  Up to 3000mg daily of tylenol is generally safe, NSAID dosing and duration limitations reviewed  Discussed nature of degenerative arthrosis of the knee.   Discussed symptom treatment with ice or heat, topical treatments, and rest if needed.   Supportive care reviewed  All questions were answered today  Contact us with additional questions or concerns  Signs and sx of concern reviewed    Thanks very much for sending this nice gentleman to us, I will keep you updated with his progress      Fredy Liang DO, [facility]  Sports Medicine Physician  [facility]              Disclaimer: This note consists of symbols derived from keyboarding, dictation and/or voice recognition software. As a result, there may be errors in the script that have gone undetected. Please consider this when interpreting information found  in this chart.      Again, thank you for allowing me to participate in the care of your patient.        Sincerely,        Fredy Liang, DO

## 2024-03-19 ENCOUNTER — MYC MEDICAL ADVICE (OUTPATIENT)
Dept: ENDOCRINOLOGY | Facility: CLINIC | Age: 59
End: 2024-03-19
Payer: COMMERCIAL

## 2024-03-19 NOTE — PROGRESS NOTES
Outcome for 03/19/24 11:08 AM: MeeGenius message sent - yanetho report  Eva Nick CMA  Adult Endocrinology  [facility] [place]

## 2024-03-20 NOTE — TELEPHONE ENCOUNTER
Patient informed to resend Raoulo report.    Eva Nick, [facility]  Adult Endocrinology  [facility], [place]

## 2024-03-22 ENCOUNTER — OFFICE VISIT (OUTPATIENT)
Dept: ENDOCRINOLOGY | Facility: CLINIC | Age: 59
End: 2024-03-22
Payer: COMMERCIAL

## 2024-03-22 ENCOUNTER — OFFICE VISIT (OUTPATIENT)
Dept: ORTHOPEDICS | Facility: CLINIC | Age: 59
End: 2024-03-22
Payer: COMMERCIAL

## 2024-03-22 VITALS
OXYGEN SATURATION: 98 % | BODY MASS INDEX: 33.66 KG/M2 | DIASTOLIC BLOOD PRESSURE: 84 MMHG | HEART RATE: 87 BPM | SYSTOLIC BLOOD PRESSURE: 156 MMHG | WEIGHT: 234.6 LBS

## 2024-03-22 VITALS — BODY MASS INDEX: 33.5 KG/M2 | WEIGHT: 234 LBS | HEIGHT: 70 IN

## 2024-03-22 DIAGNOSIS — Z79.4 TYPE 2 DIABETES MELLITUS WITH HYPERGLYCEMIA, WITH LONG-TERM CURRENT USE OF INSULIN (H): ICD-10-CM

## 2024-03-22 DIAGNOSIS — M25.561 CHRONIC PAIN OF BOTH KNEES: ICD-10-CM

## 2024-03-22 DIAGNOSIS — M25.562 CHRONIC PAIN OF BOTH KNEES: ICD-10-CM

## 2024-03-22 DIAGNOSIS — G89.29 CHRONIC PAIN OF BOTH KNEES: ICD-10-CM

## 2024-03-22 DIAGNOSIS — M17.0 PRIMARY OSTEOARTHRITIS OF BOTH KNEES: Primary | ICD-10-CM

## 2024-03-22 DIAGNOSIS — E11.65 TYPE 2 DIABETES MELLITUS WITH HYPERGLYCEMIA, WITH LONG-TERM CURRENT USE OF INSULIN (H): ICD-10-CM

## 2024-03-22 PROCEDURE — 99213 OFFICE O/P EST LOW 20 MIN: CPT | Performed by: PHYSICIAN ASSISTANT

## 2024-03-22 PROCEDURE — 20611 DRAIN/INJ JOINT/BURSA W/US: CPT | Mod: 50 | Performed by: FAMILY MEDICINE

## 2024-03-22 RX ORDER — EMPAGLIFLOZIN 25 MG/1
25 TABLET, FILM COATED ORAL DAILY
Qty: 90 TABLET | Refills: 3 | Status: SHIPPED | OUTPATIENT
Start: 2024-03-22

## 2024-03-22 NOTE — PROGRESS NOTES
Miquel Llanes  :  1965  DOS: 3/22/2024  MRN: [id number]    Sports Medicine Clinic Procedure    Ultrasound Guided Bilateral Intra-Articular Knee SynviscOne Injection, +/- Aspiration    Clinical History:  Here for US guided viscosupplementation trial as previously discussed, now pre-approved by insurance.  No new injuries or concerns.    Diagnosis:   1. Primary osteoarthritis of both knees    2. Chronic pain of both knees      Large Joint Injection/Arthocentesis: bilateral knee    Date/Time: 3/22/2024 2:42 PM    Performed by: Fredy Liang DO  Authorized by: Fredy Liang DO    Indications:  Pain and osteoarthritis  Needle Size:  21 G  Guidance: ultrasound    Approach:  Superolateral  Location:  Knee  Laterality:  Bilateral      Medications (Right):  60 mg sodium hyaluronate 60 MG/3ML  Aspirate amount (mL):  4  Aspirate:  Serous and yellow  Medications (Left):  60 mg sodium hyaluronate 60 MG/3ML  Aspirate amount (mL):  13  Aspirate:  Serous and yellow  Outcome:  Tolerated well, no immediate complications  Procedure discussed: discussed risks, benefits, and alternatives    Consent Given by:  Patient  Timeout: timeout called immediately prior to procedure    Prep: patient was prepped and draped in usual sterile fashion     Ultrasound images of procedure were permanently stored.         Impression:  Successful Bilateral intra-articular knee Durolane injection and aspiration.    Plan:  Follow up if not improved in 4-6 weeks, or sooner if needed for any concerns, otherwise prn  Expectations and limitations of Durolane were reviewed in detail  Often 4-6 weeks before full effect may be noticed  Usually covered up to every 6 months by insurance, but does not need to be repeated unless pain returns, at which point we would re-evaluate  Potential use of CSI in future for flares of pain reviewed in detail  Encouraged modified progressive pain-free activity as tolerated  HEP and Supportive care  reviewed  All questions were answered today  Contact us with additional questions or concerns  Signs and sx of concern reviewed      Fredy Liang DO, [facility]LL[facility]  Primary Care Sports Medicine  [facility]

## 2024-03-22 NOTE — LETTER
3/22/2024         RE: Miquel Llanes  [address] [address]        Dear Colleague,    Thank you for referring your patient, Miquel Llanes, to the [facility]. Please see a copy of my visit note below.        Outcome for 03/19/24 11:08 AM: Qbix message sent - livongo report  Eva Nick, [facility]  Adult Endocrinology  [facility], [place]      Assessment/Plan :   Type 2 DM. Gato continues to struggle with blood sugar control. I am hopeful that the Ozempic will help with his overall blood sugars. We discussed his current dose of 0.25 mg weekly and I would like him to increase to 0.5 mg weekly. He has not had any adverse effects at the 0.25 mg dose and I think he will be able to tolerate the increased dose but if he has a reaction, he can always go back down to 0.5 mg. We also reviewed the importance of getting his blood sugars under better control. He is motivated to get his A1C under 8%. We will follow-up in 3-4 mos.       I have independently reviewed and interpreted labs, imaging as indicated.      Chief complaint:  Miquel is a 59 year old male who returns for follow-up of Type 2 DM.    I have reviewed Care Everywhere including [facility], [facility], [facility], [facility], [facility] , [facility], [place] lab reports, imaging reports and provider notes as indicated.      HISTORY OF PRESENT ILLNESS  Gato has been struggling with blood sugar control. He was a little frustrated to hear that his A1C was still over 9%. He feels like things have been better since starting Ozempic 0.25 mg a few weeks ago. He was previously taking Mounjaro 5 mg weekly but he didn't feel like it was really working. He then went to refill the medication and he was told that we had discontinued to the medication. He reached out to his primary care provider and they decided to switch him to Ozempic. He has also continued to take metformin XR 2000 mg daily, Jardiance 25  mg daily and Lantus 50 unit(s) daily. He has been monitoring his blood sugars with fingerstick testing.     Gato was diagnosed with diabetes over 15 yrs ago. Over the years he has taken a variety of oral medications and insulin but he has been unable to get his A1C consistently under 9%. He has not had any problems with severe hyperglycemia and/or hypoglycemia but his numbers are usually over 150 mg/dl. He also denies any problems with blurred vision or worsening numbness/tingling in his feet. His diabetes is complicated by obesity, CKD stage 1, HTN, and osteoarthritis. He is scheduled to get gel knee injections today and he is hopeful that this will allow him to increase his day to day exercise.     Endocrine relevant labs are as follows:   Latest Reference Range & Units 03/01/24 10:58   Hemoglobin A1C 0.0 - 5.6 % 9.5 (H)   (H): Data is abnormally high   Latest Reference Range & Units 03/01/24 12:06   Albumin Urine mg/g Cr 0.00 - 17.00 mg/g Cr 96.28 (H)   (H): Data is abnormally high   Latest Reference Range & Units 03/01/24 12:06   Albumin Urine mg/L mg/L 31.1     REVIEW OF SYSTEMS    Endocrine: positive for diabetes and obesity  Skin: negative  Eyes: negative for, visual blurring, redness, tearing  Ears/Nose/Throat: negative  Respiratory: No shortness of breath, dyspnea on exertion, cough, or hemoptysis  Cardiovascular: negative for, chest pain, dyspnea on exertion, syncope or near-syncope, and exercise intolerance  Gastrointestinal: negative for, nausea, vomiting, constipation, and diarrhea  Genitourinary: negative for, nocturia, dysuria, frequency, and urgency  Musculoskeletal: positive for arthritis and joint stiffness, negative for, muscular weakness, nocturnal cramping, and foot pain  Neurologic: negative for, local weakness, numbness or tingling of hands, and numbness or tingling of feet  Psychiatric: negative  Hematologic/Lymphatic/Immunologic: negative    Past Medical History  Past Medical History:    Diagnosis Date     Arthritis      Background diabetic retinopathy, mild, ou 10/15/2022     Diabetes (H)      Hypertension        Medications  Current Outpatient Medications   Medication Sig Dispense Refill     insulin glargine (LANTUS PEN) 100 UNIT/ML pen Inject 50 Units Subcutaneous At Bedtime 60 mL 3     insulin pen needle (31G X 8 MM) 31G X 8 MM miscellaneous Use 1 pen needles daily or as directed. 100 each 1     JARDIANCE 25 MG TABS tablet Take 1 tablet (25 mg) by mouth daily 90 tablet 3     lisinopril (ZESTRIL) 20 MG tablet Take 1 tablet (20 mg) by mouth daily 90 tablet 3     metFORMIN (GLUCOPHAGE XR) 500 MG 24 hr tablet Take 2 tablets (1,000 mg) by mouth 2 times daily (with meals) 360 tablet 1     semaglutide (OZEMPIC) 2 MG/3ML pen Inject 0.25 mg Subcutaneous every 7 days for 28 days, THEN 0.5 mg every 7 days for 28 days. 9 mL 0     simvastatin (ZOCOR) 20 MG tablet Take 1 tablet (20 mg) by mouth at bedtime - stop Crestor 3/13/24 30 tablet 0       Allergies  Allergies   Allergen Reactions     Rosuvastatin Muscle Pain (Myalgia)     Atorvastatin Rash         Family History  family history includes Glaucoma in his maternal grandmother; Substance Abuse in his father.    Social History  Social History     Tobacco Use     Smoking status: Never     Passive exposure: Never     Smokeless tobacco: Never   Vaping Use     Vaping Use: Never used   Substance Use Topics     Alcohol use: Never     Drug use: Never       Physical Exam  BP (!) 156/84 (BP Location: Left arm, Patient Position: Sitting, Cuff Size: Adult Large)   Pulse 87   Wt 106.4 kg (234 lb 9.6 oz)   SpO2 98%   BMI 33.66 kg/m    Body mass index is 33.66 kg/m .  GENERAL :  In no apparent distress  SKIN: Normal color, normal temperature, texture.  No hirsutism, alopecia or purple striae.     EYES: PERRL, EOMI, No scleral icterus,  No proptosis, conjunctival redness, stare, retraction  RESP: Lungs clear to auscultation bilaterally  CARDIAC: Regular rate and  rhythm, normal S1 S2, without murmurs, rubs or gallops        NEURO: awake, alert, responds appropriately to questions.  Cranial nerves intact.   Moves all extremities; Gait normal.  No tremor of the outstretched hand.    EXTREMITIES: No clubbing, cyanosis or edema.    DATA REVIEW  [facility] 90 day report  Time in range 6%  High 94%  Current Ave  mg/dl  1 check per day        Again, thank you for allowing me to participate in the care of your patient.        Sincerely,        Marylou Sawyer PA-C

## 2024-03-22 NOTE — NURSING NOTE
Miquel Llanes's goals for this visit include:   Chief Complaint   Patient presents with    Diabetes     He requests these members of his care team be copied on today's visit information: No    PCP: Ana Maria Valentin    Referring Provider:  No referring provider defined for this encounter.    BP (!) 156/84 (BP Location: Left arm, Patient Position: Sitting, Cuff Size: Adult Large)   Pulse 87   Wt 106.4 kg (234 lb 9.6 oz)   SpO2 98%   BMI 33.66 kg/m      Do you need any medication refills at today's visit? No

## 2024-03-22 NOTE — PROGRESS NOTES
Assessment/Plan :   Type 2 DM. Gato continues to struggle with blood sugar control. I am hopeful that the Ozempic will help with his overall blood sugars. We discussed his current dose of 0.25 mg weekly and I would like him to increase to 0.5 mg weekly. He has not had any adverse effects at the 0.25 mg dose and I think he will be able to tolerate the increased dose but if he has a reaction, he can always go back down to 0.5 mg. We also reviewed the importance of getting his blood sugars under better control. He is motivated to get his A1C under 8%. We will follow-up in 3-4 mos.       I have independently reviewed and interpreted labs, imaging as indicated.      Chief complaint:  Miquel is a 59 year old male who returns for follow-up of Type 2 DM.    I have reviewed Care Everywhere including [facility], [facility], [facility], [facility], [place], [facility], [facility] , [facility], [place] lab reports, imaging reports and provider notes as indicated.      HISTORY OF PRESENT ILLNESS  Gato has been struggling with blood sugar control. He was a little frustrated to hear that his A1C was still over 9%. He feels like things have been better since starting Ozempic 0.25 mg a few weeks ago. He was previously taking Mounjaro 5 mg weekly but he didn't feel like it was really working. He then went to refill the medication and he was told that we had discontinued to the medication. He reached out to his primary care provider and they decided to switch him to Ozempic. He has also continued to take metformin XR 2000 mg daily, Jardiance 25 mg daily and Lantus 50 unit(s) daily. He has been monitoring his blood sugars with fingerstick testing.     Gato was diagnosed with diabetes over 15 yrs ago. Over the years he has taken a variety of oral medications and insulin but he has been unable to get his A1C consistently under 9%. He has not had any problems with severe hyperglycemia and/or hypoglycemia but his numbers are usually  over 150 mg/dl. He also denies any problems with blurred vision or worsening numbness/tingling in his feet. His diabetes is complicated by obesity, CKD stage 1, HTN, and osteoarthritis. He is scheduled to get gel knee injections today and he is hopeful that this will allow him to increase his day to day exercise.     Endocrine relevant labs are as follows:   Latest Reference Range & Units 03/01/24 10:58   Hemoglobin A1C 0.0 - 5.6 % 9.5 (H)   (H): Data is abnormally high   Latest Reference Range & Units 03/01/24 12:06   Albumin Urine mg/g Cr 0.00 - 17.00 mg/g Cr 96.28 (H)   (H): Data is abnormally high   Latest Reference Range & Units 03/01/24 12:06   Albumin Urine mg/L mg/L 31.1     REVIEW OF SYSTEMS    Endocrine: positive for diabetes and obesity  Skin: negative  Eyes: negative for, visual blurring, redness, tearing  Ears/Nose/Throat: negative  Respiratory: No shortness of breath, dyspnea on exertion, cough, or hemoptysis  Cardiovascular: negative for, chest pain, dyspnea on exertion, syncope or near-syncope, and exercise intolerance  Gastrointestinal: negative for, nausea, vomiting, constipation, and diarrhea  Genitourinary: negative for, nocturia, dysuria, frequency, and urgency  Musculoskeletal: positive for arthritis and joint stiffness, negative for, muscular weakness, nocturnal cramping, and foot pain  Neurologic: negative for, local weakness, numbness or tingling of hands, and numbness or tingling of feet  Psychiatric: negative  Hematologic/Lymphatic/Immunologic: negative    Past Medical History  Past Medical History:   Diagnosis Date    Arthritis     Background diabetic retinopathy, mild, ou 10/15/2022    Diabetes (H)     Hypertension        Medications  Current Outpatient Medications   Medication Sig Dispense Refill    insulin glargine (LANTUS PEN) 100 UNIT/ML pen Inject 50 Units Subcutaneous At Bedtime 60 mL 3    insulin pen needle (31G X 8 MM) 31G X 8 MM miscellaneous Use 1 pen needles daily or as  directed. 100 each 1    JARDIANCE 25 MG TABS tablet Take 1 tablet (25 mg) by mouth daily 90 tablet 3    lisinopril (ZESTRIL) 20 MG tablet Take 1 tablet (20 mg) by mouth daily 90 tablet 3    metFORMIN (GLUCOPHAGE XR) 500 MG 24 hr tablet Take 2 tablets (1,000 mg) by mouth 2 times daily (with meals) 360 tablet 1    semaglutide (OZEMPIC) 2 MG/3ML pen Inject 0.25 mg Subcutaneous every 7 days for 28 days, THEN 0.5 mg every 7 days for 28 days. 9 mL 0    simvastatin (ZOCOR) 20 MG tablet Take 1 tablet (20 mg) by mouth at bedtime - stop Crestor 3/13/24 30 tablet 0       Allergies  Allergies   Allergen Reactions    Rosuvastatin Muscle Pain (Myalgia)    Atorvastatin Rash         Family History  family history includes Glaucoma in his maternal grandmother; Substance Abuse in his father.    Social History  Social History     Tobacco Use    Smoking status: Never     Passive exposure: Never    Smokeless tobacco: Never   Vaping Use    Vaping Use: Never used   Substance Use Topics    Alcohol use: Never    Drug use: Never       Physical Exam  BP (!) 156/84 (BP Location: Left arm, Patient Position: Sitting, Cuff Size: Adult Large)   Pulse 87   Wt 106.4 kg (234 lb 9.6 oz)   SpO2 98%   BMI 33.66 kg/m    Body mass index is 33.66 kg/m .  GENERAL :  In no apparent distress  SKIN: Normal color, normal temperature, texture.  No hirsutism, alopecia or purple striae.     EYES: PERRL, EOMI, No scleral icterus,  No proptosis, conjunctival redness, stare, retraction  RESP: Lungs clear to auscultation bilaterally  CARDIAC: Regular rate and rhythm, normal S1 S2, without murmurs, rubs or gallops        NEURO: awake, alert, responds appropriately to questions.  Cranial nerves intact.   Moves all extremities; Gait normal.  No tremor of the outstretched hand.    EXTREMITIES: No clubbing, cyanosis or edema.    DATA REVIEW  [facility] 90 day report  Time in range 6%  High 94%  Current Ave  mg/dl  1 check per day

## 2024-03-22 NOTE — LETTER
3/22/2024         RE: Miquel Llanes  [address] [address]        Dear Colleague,    Thank you for referring your patient, Miquel Llanes, to the [facility]. Please see a copy of my visit note below.    Miquel Llanes  :  1965  DOS: 3/22/2024  MRN: [id number]    Sports Medicine Clinic Procedure    Ultrasound Guided Bilateral Intra-Articular Knee SynviscOne Injection, +/- Aspiration    Clinical History:  Here for US guided viscosupplementation trial as previously discussed, now pre-approved by insurance.  No new injuries or concerns.    Diagnosis:   1. Primary osteoarthritis of both knees    2. Chronic pain of both knees      Large Joint Injection/Arthocentesis: bilateral knee    Date/Time: 3/22/2024 2:42 PM    Performed by: Fredy Liang DO  Authorized by: Fredy Liang DO    Indications:  Pain and osteoarthritis  Needle Size:  21 G  Guidance: ultrasound    Approach:  Superolateral  Location:  Knee  Laterality:  Bilateral      Medications (Right):  60 mg sodium hyaluronate 60 MG/3ML  Aspirate amount (mL):  4  Aspirate:  Serous and yellow  Medications (Left):  60 mg sodium hyaluronate 60 MG/3ML  Aspirate amount (mL):  13  Aspirate:  Serous and yellow  Outcome:  Tolerated well, no immediate complications  Procedure discussed: discussed risks, benefits, and alternatives    Consent Given by:  Patient  Timeout: timeout called immediately prior to procedure    Prep: patient was prepped and draped in usual sterile fashion     Ultrasound images of procedure were permanently stored.         Impression:  Successful Bilateral intra-articular knee Durolane injection and aspiration.    Plan:  Follow up if not improved in 4-6 weeks, or sooner if needed for any concerns, otherwise prn  Expectations and limitations of Durolane were reviewed in detail  Often 4-6 weeks before full effect may be noticed  Usually covered up to every 6 months by  insurance, but does not need to be repeated unless pain returns, at which point we would re-evaluate  Potential use of CSI in future for flares of pain reviewed in detail  Encouraged modified progressive pain-free activity as tolerated  HEP and Supportive care reviewed  All questions were answered today  Contact us with additional questions or concerns  Signs and sx of concern reviewed      Fredy Liang DO, CAQ  Primary Care Sports Medicine  [facility]       Again, thank you for allowing me to participate in the care of your patient.        Sincerely,        Fredy Liang DO

## 2024-05-30 DIAGNOSIS — E11.29 TYPE 2 DIABETES MELLITUS WITH MICROALBUMINURIA, WITH LONG-TERM CURRENT USE OF INSULIN (H): ICD-10-CM

## 2024-05-30 DIAGNOSIS — R80.9 TYPE 2 DIABETES MELLITUS WITH MICROALBUMINURIA, WITH LONG-TERM CURRENT USE OF INSULIN (H): ICD-10-CM

## 2024-05-30 DIAGNOSIS — Z79.4 TYPE 2 DIABETES MELLITUS WITH MICROALBUMINURIA, WITH LONG-TERM CURRENT USE OF INSULIN (H): ICD-10-CM

## 2024-05-30 RX ORDER — SIMVASTATIN 20 MG
TABLET ORAL
Qty: 90 TABLET | Refills: 2 | Status: SHIPPED | OUTPATIENT
Start: 2024-05-30 | End: 2024-07-05 | Stop reason: SINTOL

## 2024-06-13 ENCOUNTER — LAB (OUTPATIENT)
Dept: LAB | Facility: CLINIC | Age: 59
End: 2024-06-13
Payer: COMMERCIAL

## 2024-06-13 DIAGNOSIS — E11.29 TYPE 2 DIABETES MELLITUS WITH MICROALBUMINURIA, WITH LONG-TERM CURRENT USE OF INSULIN (H): ICD-10-CM

## 2024-06-13 DIAGNOSIS — N18.1 CHRONIC KIDNEY DISEASE, STAGE 1: Primary | ICD-10-CM

## 2024-06-13 DIAGNOSIS — R80.9 TYPE 2 DIABETES MELLITUS WITH MICROALBUMINURIA, WITH LONG-TERM CURRENT USE OF INSULIN (H): ICD-10-CM

## 2024-06-13 DIAGNOSIS — Z79.4 TYPE 2 DIABETES MELLITUS WITH MICROALBUMINURIA, WITH LONG-TERM CURRENT USE OF INSULIN (H): ICD-10-CM

## 2024-06-13 LAB
ERYTHROCYTE [DISTWIDTH] IN BLOOD BY AUTOMATED COUNT: 13 % (ref 10–15)
HBA1C MFR BLD: 9.3 % (ref 0–5.6)
HCT VFR BLD AUTO: 46.8 % (ref 40–53)
HGB BLD-MCNC: 16.4 G/DL (ref 13.3–17.7)
MCH RBC QN AUTO: 29.4 PG (ref 26.5–33)
MCHC RBC AUTO-ENTMCNC: 35 G/DL (ref 31.5–36.5)
MCV RBC AUTO: 84 FL (ref 78–100)
PLATELET # BLD AUTO: 138 10E3/UL (ref 150–450)
RBC # BLD AUTO: 5.58 10E6/UL (ref 4.4–5.9)
WBC # BLD AUTO: 7.6 10E3/UL (ref 4–11)

## 2024-06-13 PROCEDURE — 85027 COMPLETE CBC AUTOMATED: CPT

## 2024-06-13 PROCEDURE — 36415 COLL VENOUS BLD VENIPUNCTURE: CPT

## 2024-06-13 PROCEDURE — 83036 HEMOGLOBIN GLYCOSYLATED A1C: CPT

## 2024-06-19 ENCOUNTER — TRANSFERRED RECORDS (OUTPATIENT)
Dept: HEALTH INFORMATION MANAGEMENT | Facility: CLINIC | Age: 59
End: 2024-06-19
Payer: COMMERCIAL

## 2024-06-19 LAB — RETINOPATHY: POSITIVE

## 2024-06-26 ENCOUNTER — MYC MEDICAL ADVICE (OUTPATIENT)
Dept: FAMILY MEDICINE | Facility: CLINIC | Age: 59
End: 2024-06-26
Payer: COMMERCIAL

## 2024-07-02 ENCOUNTER — MYC MEDICAL ADVICE (OUTPATIENT)
Dept: ENDOCRINOLOGY | Facility: CLINIC | Age: 59
End: 2024-07-02
Payer: COMMERCIAL

## 2024-07-02 NOTE — PROGRESS NOTES
Outcome for 07/02/24 9:34 AM:  Raoulo instructions sent via delmi Parker CMA  Adult Endocrinology   [facility]

## 2024-07-05 ENCOUNTER — OFFICE VISIT (OUTPATIENT)
Dept: ENDOCRINOLOGY | Facility: CLINIC | Age: 59
End: 2024-07-05
Payer: COMMERCIAL

## 2024-07-05 VITALS
BODY MASS INDEX: 33.15 KG/M2 | OXYGEN SATURATION: 97 % | HEART RATE: 91 BPM | WEIGHT: 231 LBS | SYSTOLIC BLOOD PRESSURE: 171 MMHG | RESPIRATION RATE: 20 BRPM | DIASTOLIC BLOOD PRESSURE: 110 MMHG

## 2024-07-05 DIAGNOSIS — I10 ESSENTIAL HYPERTENSION: ICD-10-CM

## 2024-07-05 DIAGNOSIS — R80.9 TYPE 2 DIABETES MELLITUS WITH MICROALBUMINURIA, WITH LONG-TERM CURRENT USE OF INSULIN (H): Primary | ICD-10-CM

## 2024-07-05 DIAGNOSIS — E11.29 TYPE 2 DIABETES MELLITUS WITH MICROALBUMINURIA, WITH LONG-TERM CURRENT USE OF INSULIN (H): Primary | ICD-10-CM

## 2024-07-05 DIAGNOSIS — E78.5 DYSLIPIDEMIA: ICD-10-CM

## 2024-07-05 DIAGNOSIS — Z79.4 TYPE 2 DIABETES MELLITUS WITH MICROALBUMINURIA, WITH LONG-TERM CURRENT USE OF INSULIN (H): Primary | ICD-10-CM

## 2024-07-05 PROCEDURE — 99214 OFFICE O/P EST MOD 30 MIN: CPT | Performed by: PHYSICIAN ASSISTANT

## 2024-07-05 RX ORDER — METFORMIN HCL 500 MG
500 TABLET, EXTENDED RELEASE 24 HR ORAL 2 TIMES DAILY WITH MEALS
Qty: 180 TABLET | Refills: 1 | Status: SHIPPED | OUTPATIENT
Start: 2024-07-05

## 2024-07-05 NOTE — NURSING NOTE
Miquel Llanes's goals for this visit include:   Chief Complaint   Patient presents with    Follow Up     DMII       He requests these members of his care team be copied on today's visit information: yes    PCP: Ana Maria Valentin    Referring Provider:  Referred Self, MD  No address on file    BP (!) 189/108 (BP Location: Left arm, Patient Position: Sitting, Cuff Size: Adult Regular)   Pulse 91   Resp 20   Wt 104.8 kg (231 lb)   SpO2 97%   BMI 33.15 kg/m      Do you need any medication refills at today's visit? None    Lester Marrero, EMT

## 2024-07-05 NOTE — LETTER
7/5/2024      Miquel Llanes  [address] [address]      Dear Colleague,    Thank you for referring your patient, Miquel Llanes, to the [facility]. Please see a copy of my visit note below.         Outcome for 07/02/24 9:34 AM:  Raoulo instructions sent via delmi Parker CMA  Adult Endocrinology   [facility]        Assessment/Plan :   Type 2 DM. Gato continues to struggle with blood sugar control. We reviewed the importance of getting his A1C to goal. We also discussed why his vision and neuropathy may be worse with lower numbers. If he could get his blood sugars down and keep them there, his body will get used to it. He did recently increase the Ozempic to 1 mg and he seems to be tolerating the medication nicely. I would also like him to try and go back on metformin  mg twice daily. We will follow-up in 4-5 mos.  HTN. His blood pressure is very elevated today. He does state that he is under a lot of stress. We reviewed his medications and he states that he has been taking lisinopril 20 mg daily. He has not had any problems with chest pain or headaches. He will keep an eye on his blood pressure over the next few weeks. If it remains elevated he will reach out to his primary care provider.      I have independently reviewed and interpreted labs, imaging as indicated.      Chief complaint:  Miquel is a 59 year old male who returns for follow-up of Type 2 DM.    I have reviewed Care Everywhere including [facility], [facility], [facility], [facility], [facility] , [facility], [place] lab reports, imaging reports and provider notes as indicated.      HISTORY OF PRESENT ILLNESS  Gato feels like things are stable. He knows that he needs to get his blood sugars under better control but he feels better when his numbers are more elevated. He is currently taking metformin  mg daily along with Jardiance 25 mg daily,  Lantus 50 unit(s) daily and he recently increased the Ozempic to 1 mg weekly. He monitors his blood sugars with fingerstick testing 1-2x daily.    Gato has not had any problems with severe hyperglycemia and/or hypoglycemia. He is just getting over a sinus infection. He noticed that his blood sugars were higher when he was really sick. He has also noticed that his neuropathy and vision seem to worsen when his blood sugars are lower. He doesn't like it when his blood sugars drop under 150 mg/dl but he doesn't feel good.     Gato was diagnosed with diabetes over 15 yrs ago. Over the years he has taken a variety of oral medications and insulin but he has been unable to get his A1C consistently under 9%. He worries about medication side effects and he feels like he has trouble tolerating most medication. He has been on a combination of insulin and metformin for many years but recently seemed to develop more upset stomach with metformin, so he cut back down to 1 tablet daily. He was also taking simvastatin for the last year but recently developed muscle cramps and had to stop the medication. His diabetes is complicated by CKD stage 1, hyperlipidemia, and osteoarthritis.    Endocrine relevant labs are as follows:   Latest Reference Range & Units 06/13/24 12:31   Hemoglobin A1C 0.0 - 5.6 % 9.3 (H)   (H): Data is abnormally high   Latest Reference Range & Units 03/01/24 10:58   Hemoglobin A1C 0.0 - 5.6 % 9.5 (H)   (H): Data is abnormally high   Latest Reference Range & Units 03/01/24 12:06   Albumin Urine mg/g Cr 0.00 - 17.00 mg/g Cr 96.28 (H)   (H): Data is abnormally high   Latest Reference Range & Units 03/01/24 12:06   Albumin Urine mg/L mg/L 31.1     REVIEW OF SYSTEMS    Endocrine: positive for diabetes and obesity  Skin: negative  Eyes: negative for, visual blurring, redness, tearing  Ears/Nose/Throat: negative  Respiratory: No shortness of breath, dyspnea on exertion, cough, or hemoptysis  Cardiovascular: negative  for, chest pain, lower extremity edema, and exercise intolerance  Gastrointestinal: negative for, nausea, vomiting, constipation, and diarrhea  Genitourinary: negative for, nocturia, dysuria, frequency, and urgency  Musculoskeletal: negative for, muscular weakness, nocturnal cramping, and foot pain  Neurologic: positive for numbness or tingling of feet, negative for, local weakness, and numbness or tingling of hands  Psychiatric: negative  Hematologic/Lymphatic/Immunologic: negative    Past Medical History  Past Medical History:   Diagnosis Date     Arthritis      Background diabetic retinopathy, mild, ou 10/15/2022     Diabetes (H)      Hypertension        Medications  Current Outpatient Medications   Medication Sig Dispense Refill     insulin glargine (LANTUS PEN) 100 UNIT/ML pen Inject 50 Units Subcutaneous At Bedtime 60 mL 3     insulin pen needle (31G X 8 MM) 31G X 8 MM miscellaneous Use 1 pen needles daily or as directed. 100 each 1     JARDIANCE 25 MG TABS tablet Take 1 tablet (25 mg) by mouth daily 90 tablet 3     lisinopril (ZESTRIL) 20 MG tablet Take 1 tablet (20 mg) by mouth daily 90 tablet 3     metFORMIN (GLUCOPHAGE XR) 500 MG 24 hr tablet Take 2 tablets (1,000 mg) by mouth 2 times daily (with meals) 360 tablet 1     Semaglutide, 1 MG/DOSE, (OZEMPIC) 4 MG/3ML pen Inject 1 mg Subcutaneous every 7 days - dose increase 6/14/24 3 mL 0     simvastatin (ZOCOR) 20 MG tablet TAKE 1 TABLET(20 MG) BY MOUTH AT BEDTIME. STOP CRESTOR 3/13/24 90 tablet 2       Allergies  Allergies   Allergen Reactions     Rosuvastatin Muscle Pain (Myalgia)     Atorvastatin Rash         Family History  family history includes Glaucoma in his maternal grandmother; Substance Abuse in his father.    Social History  Social History     Tobacco Use     Smoking status: Never     Passive exposure: Never     Smokeless tobacco: Never   Vaping Use     Vaping status: Never Used   Substance Use Topics     Alcohol use: Never     Drug use: Never        Physical Exam  There were no vitals taken for this visit.  There is no height or weight on file to calculate BMI.  GENERAL :  In no apparent distress  SKIN: Normal color, normal temperature, texture.  No hirsutism, alopecia or purple striae.     EYES: PERRL, EOMI, No scleral icterus,  No proptosis, conjunctival redness, stare, retraction  RESP: Lungs clear to auscultation bilaterally  CARDIAC: Regular rate and rhythm, normal S1 S2, without murmurs, rubs or gallops    NEURO: awake, alert, responds appropriately to questions.  Cranial nerves intact.   Moves all extremities; Gait normal.  No tremor of the outstretched hand.    EXTREMITIES: No clubbing, cyanosis or edema.    DATA REVIEW  [place] Report  Current Ave  mg/dl        Again, thank you for allowing me to participate in the care of your patient.        Sincerely,        Marylou Sawyer PA-C

## 2024-07-05 NOTE — PROGRESS NOTES
Assessment/Plan :   Type 2 DM. Gato continues to struggle with blood sugar control. We reviewed the importance of getting his A1C to goal. We also discussed why his vision and neuropathy may be worse with lower numbers. If he could get his blood sugars down and keep them there, his body will get used to it. He did recently increase the Ozempic to 1 mg and he seems to be tolerating the medication nicely. I would also like him to try and go back on metformin  mg twice daily. We will follow-up in 4-5 mos.  HTN. His blood pressure is very elevated today. He does state that he is under a lot of stress. We reviewed his medications and he states that he has been taking lisinopril 20 mg daily. He has not had any problems with chest pain or headaches. He will keep an eye on his blood pressure over the next few weeks. If it remains elevated he will reach out to his primary care provider.      I have independently reviewed and interpreted labs, imaging as indicated.      Chief complaint:  Miquel is a 59 year old male who returns for follow-up of Type 2 DM.    I have reviewed Care Everywhere including [facility], [facility], [facility], [facility], [facility] , [facility], [place] lab reports, imaging reports and provider notes as indicated.      HISTORY OF PRESENT ILLNESS  Gato feels like things are stable. He knows that he needs to get his blood sugars under better control but he feels better when his numbers are more elevated. He is currently taking metformin  mg daily along with Jardiance 25 mg daily, Lantus 50 unit(s) daily and he recently increased the Ozempic to 1 mg weekly. He monitors his blood sugars with fingerstick testing 1-2x daily.    Gato has not had any problems with severe hyperglycemia and/or hypoglycemia. He is just getting over a sinus infection. He noticed that his blood sugars were higher when he was really sick. He has also noticed that his neuropathy and vision seem  to worsen when his blood sugars are lower. He doesn't like it when his blood sugars drop under 150 mg/dl but he doesn't feel good.     Gato was diagnosed with diabetes over 15 yrs ago. Over the years he has taken a variety of oral medications and insulin but he has been unable to get his A1C consistently under 9%. He worries about medication side effects and he feels like he has trouble tolerating most medication. He has been on a combination of insulin and metformin for many years but recently seemed to develop more upset stomach with metformin, so he cut back down to 1 tablet daily. He was also taking simvastatin for the last year but recently developed muscle cramps and had to stop the medication. His diabetes is complicated by CKD stage 1, hyperlipidemia, and osteoarthritis.    Endocrine relevant labs are as follows:   Latest Reference Range & Units 06/13/24 12:31   Hemoglobin A1C 0.0 - 5.6 % 9.3 (H)   (H): Data is abnormally high   Latest Reference Range & Units 03/01/24 10:58   Hemoglobin A1C 0.0 - 5.6 % 9.5 (H)   (H): Data is abnormally high   Latest Reference Range & Units 03/01/24 12:06   Albumin Urine mg/g Cr 0.00 - 17.00 mg/g Cr 96.28 (H)   (H): Data is abnormally high   Latest Reference Range & Units 03/01/24 12:06   Albumin Urine mg/L mg/L 31.1     REVIEW OF SYSTEMS    Endocrine: positive for diabetes and obesity  Skin: negative  Eyes: negative for, visual blurring, redness, tearing  Ears/Nose/Throat: negative  Respiratory: No shortness of breath, dyspnea on exertion, cough, or hemoptysis  Cardiovascular: negative for, chest pain, lower extremity edema, and exercise intolerance  Gastrointestinal: negative for, nausea, vomiting, constipation, and diarrhea  Genitourinary: negative for, nocturia, dysuria, frequency, and urgency  Musculoskeletal: negative for, muscular weakness, nocturnal cramping, and foot pain  Neurologic: positive for numbness or tingling of feet, negative for, local weakness, and  numbness or tingling of hands  Psychiatric: negative  Hematologic/Lymphatic/Immunologic: negative    Past Medical History  Past Medical History:   Diagnosis Date    Arthritis     Background diabetic retinopathy, mild, ou 10/15/2022    Diabetes (H)     Hypertension        Medications  Current Outpatient Medications   Medication Sig Dispense Refill    insulin glargine (LANTUS PEN) 100 UNIT/ML pen Inject 50 Units Subcutaneous At Bedtime 60 mL 3    insulin pen needle (31G X 8 MM) 31G X 8 MM miscellaneous Use 1 pen needles daily or as directed. 100 each 1    JARDIANCE 25 MG TABS tablet Take 1 tablet (25 mg) by mouth daily 90 tablet 3    lisinopril (ZESTRIL) 20 MG tablet Take 1 tablet (20 mg) by mouth daily 90 tablet 3    metFORMIN (GLUCOPHAGE XR) 500 MG 24 hr tablet Take 2 tablets (1,000 mg) by mouth 2 times daily (with meals) 360 tablet 1    Semaglutide, 1 MG/DOSE, (OZEMPIC) 4 MG/3ML pen Inject 1 mg Subcutaneous every 7 days - dose increase 6/14/24 3 mL 0    simvastatin (ZOCOR) 20 MG tablet TAKE 1 TABLET(20 MG) BY MOUTH AT BEDTIME. STOP CRESTOR 3/13/24 90 tablet 2       Allergies  Allergies   Allergen Reactions    Rosuvastatin Muscle Pain (Myalgia)    Atorvastatin Rash         Family History  family history includes Glaucoma in his maternal grandmother; Substance Abuse in his father.    Social History  Social History     Tobacco Use    Smoking status: Never     Passive exposure: Never    Smokeless tobacco: Never   Vaping Use    Vaping status: Never Used   Substance Use Topics    Alcohol use: Never    Drug use: Never       Physical Exam  There were no vitals taken for this visit.  There is no height or weight on file to calculate BMI.  GENERAL :  In no apparent distress  SKIN: Normal color, normal temperature, texture.  No hirsutism, alopecia or purple striae.     EYES: PERRL, EOMI, No scleral icterus,  No proptosis, conjunctival redness, stare, retraction  RESP: Lungs clear to auscultation bilaterally  CARDIAC: Regular  rate and rhythm, normal S1 S2, without murmurs, rubs or gallops    NEURO: awake, alert, responds appropriately to questions.  Cranial nerves intact.   Moves all extremities; Gait normal.  No tremor of the outstretched hand.    EXTREMITIES: No clubbing, cyanosis or edema.    DATA REVIEW  [place] Report  Current Ave  mg/dl

## 2024-07-09 DIAGNOSIS — Z79.4 TYPE 2 DIABETES MELLITUS WITH MICROALBUMINURIA, WITH LONG-TERM CURRENT USE OF INSULIN (H): ICD-10-CM

## 2024-07-09 DIAGNOSIS — E11.29 TYPE 2 DIABETES MELLITUS WITH MICROALBUMINURIA, WITH LONG-TERM CURRENT USE OF INSULIN (H): ICD-10-CM

## 2024-07-09 DIAGNOSIS — R80.9 TYPE 2 DIABETES MELLITUS WITH MICROALBUMINURIA, WITH LONG-TERM CURRENT USE OF INSULIN (H): ICD-10-CM

## 2024-07-09 RX ORDER — SEMAGLUTIDE 1.34 MG/ML
INJECTION, SOLUTION SUBCUTANEOUS
Qty: 3 ML | Refills: 0 | Status: SHIPPED | OUTPATIENT
Start: 2024-07-09 | End: 2024-08-14

## 2024-07-12 ENCOUNTER — TELEPHONE (OUTPATIENT)
Dept: FAMILY MEDICINE | Facility: CLINIC | Age: 59
End: 2024-07-12
Payer: COMMERCIAL

## 2024-07-12 NOTE — TELEPHONE ENCOUNTER
Prior Authorization Retail Medication Request    Medication/Dose: OZEMPIC, 1 MG/DOSE, 4 MG/3ML pen  Diagnosis and ICD code (if different than what is on RX):  E11.29, R80.9, Z79.4   New/renewal/insurance change PA/secondary ins. PA:  Previously Tried and Failed:  na  Rationale:  na    Insurance   Primary:  [employer]  Insurance ID:  [id number]     Secondary (if applicable):na  Insurance ID:  na    Pharmacy Information (if different than what is on RX)  Name:   [facility]  Phone:  [phone]  Fax:      [phone]

## 2024-07-16 ENCOUNTER — MYC REFILL (OUTPATIENT)
Dept: FAMILY MEDICINE | Facility: CLINIC | Age: 59
End: 2024-07-16
Payer: COMMERCIAL

## 2024-07-16 DIAGNOSIS — Z79.4 TYPE 2 DIABETES MELLITUS WITH MICROALBUMINURIA, WITH LONG-TERM CURRENT USE OF INSULIN (H): ICD-10-CM

## 2024-07-16 DIAGNOSIS — E11.29 TYPE 2 DIABETES MELLITUS WITH MICROALBUMINURIA, WITH LONG-TERM CURRENT USE OF INSULIN (H): ICD-10-CM

## 2024-07-16 DIAGNOSIS — R80.9 TYPE 2 DIABETES MELLITUS WITH MICROALBUMINURIA, WITH LONG-TERM CURRENT USE OF INSULIN (H): ICD-10-CM

## 2024-07-16 NOTE — TELEPHONE ENCOUNTER
Central Prior Authorization Team   Phone: [phone]    PA Initiation    Medication: OZEMPIC, 1 MG/DOSE, 4 MG/3ML pen  Insurance Company: [employer] ([facility]) - Phone [phone] Fax [phone]  Pharmacy Filling the Rx: [id number] - [place], [place] - [address] AT [facility] & [address]  Filling Pharmacy Phone: [phone]  Filling Pharmacy Fax:    Start Date: 7/16/2024           [General Appearance - Well Developed] : well developed [General Appearance - Well Nourished] : well nourished [Normal Appearance] : normal appearance [Well Groomed] : well groomed [General Appearance - In No Acute Distress] : no acute distress [Abdomen Soft] : soft [Abdomen Tenderness] : non-tender [Costovertebral Angle Tenderness] : no ~M costovertebral angle tenderness [Urinary Bladder Findings] : the bladder was normal on palpation [Edema] : no peripheral edema [] : no respiratory distress [Respiration, Rhythm And Depth] : normal respiratory rhythm and effort [Exaggerated Use Of Accessory Muscles For Inspiration] : no accessory muscle use [Oriented To Time, Place, And Person] : oriented to person, place, and time [Affect] : the affect was normal [Mood] : the mood was normal [Not Anxious] : not anxious [Normal Station and Gait] : the gait and station were normal for the patient's age [No Focal Deficits] : no focal deficits [No Palpable Adenopathy] : no palpable adenopathy [FreeTextEntry1] : No bladder pain.

## 2024-07-16 NOTE — TELEPHONE ENCOUNTER
Prior Authorization Approval    Authorization Effective Date: 7/16/2024  Authorization Expiration Date: 7/30/2024  Medication: OZEMPIC, 1 MG/DOSE, 4 MG/3ML pen  Approved Dose/Quantity:    Reference #:     Insurance Company: [employer] ([facility]) - Phone [phone] Fax [phone]  Expected CoPay:       CoPay Card Available:      Foundation Assistance Needed:    Which Pharmacy is filling the prescription (Not needed for infusion/clinic administered): [id number] - [place], [place] - [address] AT [facility] & [address]  Pharmacy Notified:  Yes  Patient Notified:  **Instructed pharmacy to notify patient when script is ready to /ship.**

## 2024-07-17 RX ORDER — SEMAGLUTIDE 1.34 MG/ML
INJECTION, SOLUTION SUBCUTANEOUS
Qty: 3 ML | Refills: 0 | OUTPATIENT
Start: 2024-07-17

## 2024-08-03 ENCOUNTER — HEALTH MAINTENANCE LETTER (OUTPATIENT)
Age: 59
End: 2024-08-03

## 2024-08-13 DIAGNOSIS — E11.29 TYPE 2 DIABETES MELLITUS WITH MICROALBUMINURIA, WITH LONG-TERM CURRENT USE OF INSULIN (H): ICD-10-CM

## 2024-08-13 DIAGNOSIS — Z79.4 TYPE 2 DIABETES MELLITUS WITH MICROALBUMINURIA, WITH LONG-TERM CURRENT USE OF INSULIN (H): ICD-10-CM

## 2024-08-13 DIAGNOSIS — R80.9 TYPE 2 DIABETES MELLITUS WITH MICROALBUMINURIA, WITH LONG-TERM CURRENT USE OF INSULIN (H): ICD-10-CM

## 2024-08-14 RX ORDER — SEMAGLUTIDE 1.34 MG/ML
INJECTION, SOLUTION SUBCUTANEOUS
Qty: 3 ML | Refills: 0 | Status: SHIPPED | OUTPATIENT
Start: 2024-08-14 | End: 2024-09-11

## 2024-10-12 ENCOUNTER — HEALTH MAINTENANCE LETTER (OUTPATIENT)
Age: 59
End: 2024-10-12

## 2024-10-15 ENCOUNTER — MYC REFILL (OUTPATIENT)
Dept: ENDOCRINOLOGY | Facility: CLINIC | Age: 59
End: 2024-10-15
Payer: COMMERCIAL

## 2024-10-15 ENCOUNTER — MYC REFILL (OUTPATIENT)
Dept: FAMILY MEDICINE | Facility: CLINIC | Age: 59
End: 2024-10-15
Payer: COMMERCIAL

## 2024-10-15 DIAGNOSIS — E11.65 TYPE 2 DIABETES MELLITUS WITH HYPERGLYCEMIA, WITH LONG-TERM CURRENT USE OF INSULIN (H): ICD-10-CM

## 2024-10-15 DIAGNOSIS — Z79.4 TYPE 2 DIABETES MELLITUS WITH HYPERGLYCEMIA, WITH LONG-TERM CURRENT USE OF INSULIN (H): ICD-10-CM

## 2024-10-15 DIAGNOSIS — E11.29 TYPE 2 DIABETES MELLITUS WITH MICROALBUMINURIA, WITH LONG-TERM CURRENT USE OF INSULIN (H): ICD-10-CM

## 2024-10-15 DIAGNOSIS — R80.9 TYPE 2 DIABETES MELLITUS WITH MICROALBUMINURIA, WITH LONG-TERM CURRENT USE OF INSULIN (H): ICD-10-CM

## 2024-10-15 DIAGNOSIS — Z79.4 TYPE 2 DIABETES MELLITUS WITH MICROALBUMINURIA, WITH LONG-TERM CURRENT USE OF INSULIN (H): ICD-10-CM

## 2024-10-16 RX ORDER — SEMAGLUTIDE 1.34 MG/ML
1 INJECTION, SOLUTION SUBCUTANEOUS
Qty: 3 ML | Refills: 1 | Status: SHIPPED | OUTPATIENT
Start: 2024-10-16

## 2024-10-28 ENCOUNTER — LAB (OUTPATIENT)
Dept: LAB | Facility: CLINIC | Age: 59
End: 2024-10-28
Attending: PHYSICIAN ASSISTANT
Payer: COMMERCIAL

## 2024-10-28 DIAGNOSIS — Z79.4 TYPE 2 DIABETES MELLITUS WITH MICROALBUMINURIA, WITH LONG-TERM CURRENT USE OF INSULIN (H): ICD-10-CM

## 2024-10-28 DIAGNOSIS — N18.1 CHRONIC KIDNEY DISEASE, STAGE 1: Primary | ICD-10-CM

## 2024-10-28 DIAGNOSIS — E11.29 TYPE 2 DIABETES MELLITUS WITH MICROALBUMINURIA, WITH LONG-TERM CURRENT USE OF INSULIN (H): ICD-10-CM

## 2024-10-28 DIAGNOSIS — R80.9 TYPE 2 DIABETES MELLITUS WITH MICROALBUMINURIA, WITH LONG-TERM CURRENT USE OF INSULIN (H): ICD-10-CM

## 2024-10-28 DIAGNOSIS — Z83.49 FAMILY HISTORY OF THYROID DISEASE: ICD-10-CM

## 2024-10-28 LAB
ANION GAP SERPL CALCULATED.3IONS-SCNC: 12 MMOL/L (ref 7–15)
BUN SERPL-MCNC: 13.2 MG/DL (ref 6–20)
CALCIUM SERPL-MCNC: 9.4 MG/DL (ref 8.8–10.4)
CHLORIDE SERPL-SCNC: 101 MMOL/L (ref 98–107)
CREAT SERPL-MCNC: 0.77 MG/DL (ref 0.67–1.17)
EGFRCR SERPLBLD CKD-EPI 2021: >90 ML/MIN/1.73M2
EST. AVERAGE GLUCOSE BLD GHB EST-MCNC: 217 MG/DL
GLUCOSE SERPL-MCNC: 176 MG/DL (ref 70–99)
HBA1C MFR BLD: 9.2 % (ref 0–5.6)
HCO3 SERPL-SCNC: 23 MMOL/L (ref 22–29)
POTASSIUM SERPL-SCNC: 4.3 MMOL/L (ref 3.4–5.3)
SODIUM SERPL-SCNC: 136 MMOL/L (ref 135–145)
TSH SERPL DL<=0.005 MIU/L-ACNC: 1.55 UIU/ML (ref 0.3–4.2)

## 2024-10-28 PROCEDURE — 80048 BASIC METABOLIC PNL TOTAL CA: CPT

## 2024-10-28 PROCEDURE — 36415 COLL VENOUS BLD VENIPUNCTURE: CPT

## 2024-10-28 PROCEDURE — 84443 ASSAY THYROID STIM HORMONE: CPT

## 2024-10-28 PROCEDURE — 83036 HEMOGLOBIN GLYCOSYLATED A1C: CPT

## 2024-10-30 ENCOUNTER — MYC MEDICAL ADVICE (OUTPATIENT)
Dept: FAMILY MEDICINE | Facility: CLINIC | Age: 59
End: 2024-10-30
Payer: COMMERCIAL

## 2024-10-30 DIAGNOSIS — E11.29 TYPE 2 DIABETES MELLITUS WITH MICROALBUMINURIA, WITH LONG-TERM CURRENT USE OF INSULIN (H): ICD-10-CM

## 2024-10-30 DIAGNOSIS — R80.9 TYPE 2 DIABETES MELLITUS WITH MICROALBUMINURIA, WITH LONG-TERM CURRENT USE OF INSULIN (H): ICD-10-CM

## 2024-10-30 DIAGNOSIS — Z79.4 TYPE 2 DIABETES MELLITUS WITH MICROALBUMINURIA, WITH LONG-TERM CURRENT USE OF INSULIN (H): ICD-10-CM

## 2024-10-30 DIAGNOSIS — E11.65 UNCONTROLLED TYPE 2 DIABETES MELLITUS WITH HYPERGLYCEMIA (H): Primary | ICD-10-CM

## 2025-02-22 ENCOUNTER — HEALTH MAINTENANCE LETTER (OUTPATIENT)
Age: 60
End: 2025-02-22

## 2025-02-24 ENCOUNTER — ANCILLARY PROCEDURE (OUTPATIENT)
Dept: CT IMAGING | Facility: CLINIC | Age: 60
End: 2025-02-24
Attending: PHYSICIAN ASSISTANT
Payer: COMMERCIAL

## 2025-02-24 DIAGNOSIS — J32.9 CHRONIC CONGESTION OF PARANASAL SINUS: ICD-10-CM

## 2025-02-24 PROCEDURE — 70486 CT MAXILLOFACIAL W/O DYE: CPT | Mod: TC | Performed by: RADIOLOGY

## 2025-02-25 ENCOUNTER — MYC MEDICAL ADVICE (OUTPATIENT)
Dept: FAMILY MEDICINE | Facility: CLINIC | Age: 60
End: 2025-02-25
Payer: COMMERCIAL

## 2025-02-25 DIAGNOSIS — J32.9 CHRONIC CONGESTION OF PARANASAL SINUS: Primary | ICD-10-CM

## 2025-03-13 SDOH — HEALTH STABILITY: PHYSICAL HEALTH: ON AVERAGE, HOW MANY DAYS PER WEEK DO YOU ENGAGE IN MODERATE TO STRENUOUS EXERCISE (LIKE A BRISK WALK)?: 1 DAY

## 2025-03-13 SDOH — HEALTH STABILITY: PHYSICAL HEALTH: ON AVERAGE, HOW MANY MINUTES DO YOU ENGAGE IN EXERCISE AT THIS LEVEL?: 30 MIN

## 2025-03-13 ASSESSMENT — SOCIAL DETERMINANTS OF HEALTH (SDOH): HOW OFTEN DO YOU GET TOGETHER WITH FRIENDS OR RELATIVES?: ONCE A WEEK

## 2025-03-18 ENCOUNTER — OFFICE VISIT (OUTPATIENT)
Dept: FAMILY MEDICINE | Facility: CLINIC | Age: 60
End: 2025-03-18
Payer: COMMERCIAL

## 2025-03-18 VITALS
BODY MASS INDEX: 31.5 KG/M2 | HEIGHT: 70 IN | TEMPERATURE: 97.5 F | WEIGHT: 220 LBS | RESPIRATION RATE: 16 BRPM | SYSTOLIC BLOOD PRESSURE: 148 MMHG | HEART RATE: 90 BPM | DIASTOLIC BLOOD PRESSURE: 94 MMHG | OXYGEN SATURATION: 98 %

## 2025-03-18 DIAGNOSIS — G47.19 EXCESSIVE DAYTIME SLEEPINESS: ICD-10-CM

## 2025-03-18 DIAGNOSIS — Z12.11 SCREEN FOR COLON CANCER: ICD-10-CM

## 2025-03-18 DIAGNOSIS — E11.29 TYPE 2 DIABETES MELLITUS WITH MICROALBUMINURIA, WITH LONG-TERM CURRENT USE OF INSULIN (H): ICD-10-CM

## 2025-03-18 DIAGNOSIS — Z12.5 SCREENING PSA (PROSTATE SPECIFIC ANTIGEN): ICD-10-CM

## 2025-03-18 DIAGNOSIS — Z00.00 ENCOUNTER FOR ROUTINE ADULT HEALTH EXAMINATION WITHOUT ABNORMAL FINDINGS: Primary | ICD-10-CM

## 2025-03-18 DIAGNOSIS — R80.9 TYPE 2 DIABETES MELLITUS WITH MICROALBUMINURIA, WITH LONG-TERM CURRENT USE OF INSULIN (H): ICD-10-CM

## 2025-03-18 DIAGNOSIS — N52.9 ERECTILE DYSFUNCTION, UNSPECIFIED ERECTILE DYSFUNCTION TYPE: ICD-10-CM

## 2025-03-18 DIAGNOSIS — Z79.4 TYPE 2 DIABETES MELLITUS WITH MICROALBUMINURIA, WITH LONG-TERM CURRENT USE OF INSULIN (H): ICD-10-CM

## 2025-03-18 DIAGNOSIS — N18.1 CHRONIC KIDNEY DISEASE, STAGE 1: ICD-10-CM

## 2025-03-18 DIAGNOSIS — I10 ESSENTIAL HYPERTENSION: ICD-10-CM

## 2025-03-18 DIAGNOSIS — E11.3299 BACKGROUND DIABETIC RETINOPATHY (H): ICD-10-CM

## 2025-03-18 PROCEDURE — 99207 PR FOOT EXAM NO CHARGE: CPT | Performed by: PHYSICIAN ASSISTANT

## 2025-03-18 PROCEDURE — 99396 PREV VISIT EST AGE 40-64: CPT | Performed by: PHYSICIAN ASSISTANT

## 2025-03-18 PROCEDURE — 99214 OFFICE O/P EST MOD 30 MIN: CPT | Mod: 25 | Performed by: PHYSICIAN ASSISTANT

## 2025-03-18 PROCEDURE — 3080F DIAST BP >= 90 MM HG: CPT | Performed by: PHYSICIAN ASSISTANT

## 2025-03-18 PROCEDURE — 3077F SYST BP >= 140 MM HG: CPT | Performed by: PHYSICIAN ASSISTANT

## 2025-03-18 RX ORDER — LISINOPRIL 40 MG/1
40 TABLET ORAL DAILY
Qty: 90 TABLET | Refills: 3 | Status: SHIPPED | OUTPATIENT
Start: 2025-03-18

## 2025-03-18 RX ORDER — SILDENAFIL CITRATE 20 MG/1
40-100 TABLET ORAL DAILY PRN
Qty: 30 TABLET | Refills: 2 | Status: SHIPPED | OUTPATIENT
Start: 2025-03-18

## 2025-03-18 RX ORDER — EMPAGLIFLOZIN 25 MG/1
25 TABLET, FILM COATED ORAL DAILY
Qty: 90 TABLET | Refills: 3 | Status: SHIPPED | OUTPATIENT
Start: 2025-03-18

## 2025-03-18 NOTE — PATIENT INSTRUCTIONS
Melatonin and Benadryl are both helpful OTC sleep aids. They can be used together as well.  Melatonin 5-10mg, take 2-3 hours before bed  Benadryl 50-100mg, take 30-60 mins before bed    VitaFusion gummy Multivitamin

## 2025-03-18 NOTE — PROGRESS NOTES
Preventive Care Visit  [facility] SANIYA Valentin PA-C, Family Medicine  Mar 18, 2025      Assessment & Plan       ICD-10-CM    1. Encounter for routine adult health examination without abnormal findings  Z00.00       2. Screening PSA (prostate specific antigen)  Z12.5 PSA, screen      3. Screen for colon cancer  Z12.11 COLOGUARD(EXACT SCIENCES)      4. Type 2 diabetes mellitus with microalbuminuria, with long-term current use of insulin (H)  E11.29 FOOT EXAM    R80.9 Comprehensive metabolic panel (BMP + Alb, Alk Phos, ALT, AST, Total. Bili, TP)    Z79.4 lisinopril (ZESTRIL) 40 MG tablet     insulin glargine (LANTUS PEN) 100 UNIT/ML pen     JARDIANCE 25 MG TABS tablet      5. Essential hypertension  I10 lisinopril (ZESTRIL) 40 MG tablet      6. Chronic kidney disease, stage 1  N18.1 CBC with Platelets     Albumin Random Urine Quantitative with Creat Ratio      7. Background diabetic retinopathy (H)  E11.3299 HEMOGLOBIN A1C      8. Excessive daytime sleepiness  G47.19 Adult Sleep Eval & Management  Referral      9. Erectile dysfunction, unspecified erectile dysfunction type  N52.9 Testosterone Free and Total     sildenafil (REVATIO) 20 MG tablet          1-3) Screenings/preventative measures discussed    4-6) Blood pressure elevated. Increase lisinopril to 40mg daily. Routine labs ordered, he will schedule these in ~2 weeks, before 9am. He has been off Metformin x3 months. Other meds renewed, no changes    7) Follows eye specialist    8) Referral to Sleep specialty    9) Will check his testosterone. Viagra PRN      Patient Instructions   Melatonin and Benadryl are both helpful OTC sleep aids. They can be used together as well.  Melatonin 5-10mg, take 2-3 hours before bed  Benadryl 50-100mg, take 30-60 mins before bed    VitaFusion gummy Multivitamin      BMI  Estimated body mass index is 31.57 kg/m  as calculated from the following:    Height as of this encounter: 1.778 m (5'  "10\").    Weight as of this encounter: 99.8 kg (220 lb).     Counseling  Appropriate preventive services were addressed with this patient via screening, questionnaire, or discussion as appropriate for fall prevention, nutrition, physical activity, Tobacco-use cessation, social engagement, weight loss and cognition.  Checklist reviewing preventive services available has been given to the patient.  Reviewed patient's diet, addressing concerns and/or questions.   He is at risk for lack of exercise and has been provided with information to increase physical activity for the benefit of his well-being.     Return in about 2 weeks (around 4/1/2025) for fasting labs, lab only appointment okTejinder Hoskins is a 60 year old, presenting for the following:  Physical        3/18/2025    10:08 AM   Additional Questions   Roomed by Jennifer   Accompanied by Self         3/18/2025    10:08 AM   Patient Reported Additional Medications   Patient reports taking the following new medications NA          HPI     Patient with history of Diabetes and Hypertension arrived for Annual Physical.    Patient is fasting for lab work.     Patient also has concerns with ongoing Nasal Congestion/feeling plugged at night.   Flonase and a sinus rinse has helped  Saw ENT - they didn't find anything  Been dealing with this for years  Clear mucous production     Diabetes Follow-up    How often are you checking your blood sugar? One time daily  What time of day are you checking your blood sugars (select all that apply)?  Before meals  Have you had any blood sugars above 200?  No averaging around 185  Have you had any blood sugars below 70?  No  What symptoms do you notice when your blood sugar is low?  None  What concerns do you have today about your diabetes? None   Do you have any of these symptoms? (Select all that apply)  Numbness in feet      BP Readings from Last 2 Encounters:   03/18/25 (!) 148/94   07/05/24 (!) 171/110     Hemoglobin A1C " (%)   Date Value   10/28/2024 9.2 (H)   06/13/2024 9.3 (H)   03/29/2021 8.9 (H)   08/11/2020 9.3 (H)     LDL Cholesterol Calculated (mg/dL)   Date Value   03/01/2024 66   11/25/2022 40   03/29/2021     Cannot estimate LDL when triglyceride exceeds 400 mg/dL   08/17/2019 54     LDL Cholesterol Direct (mg/dL)   Date Value   03/29/2021 56             Hypertension Follow-up    Do you check your blood pressure regularly outside of the clinic? No   Are you following a low salt diet? No  Are your blood pressures ever more than 140 on the top number (systolic) OR more   than 90 on the bottom number (diastolic), for example 140/90? N/A    Advance Care Planning  Patient does not have a Health Care Directive      3/13/2025   General Health   How would you rate your overall physical health? Good   Feel stress (tense, anxious, or unable to sleep) Only a little   (!) STRESS CONCERN      3/13/2025   Nutrition   Three or more servings of calcium each day? Yes   Diet: Diabetic   How many servings of fruit and vegetables per day? (!) 0-1   How many sweetened beverages each day? 0-1         3/13/2025   Exercise   Days per week of moderate/strenous exercise 1 day   Average minutes spent exercising at this level 30 min   (!) EXERCISE CONCERN      3/13/2025   Social Factors   Frequency of gathering with friends or relatives Once a week   Worry food won't last until get money to buy more No   Food not last or not have enough money for food? No   Do you have housing? (Housing is defined as stable permanent housing and does not include staying ouside in a car, in a tent, in an abandoned building, in an overnight shelter, or couch-surfing.) Yes   Are you worried about losing your housing? No   Lack of transportation? No   Unable to get utilities (heat,electricity)? No         3/13/2025   Fall Risk   Fallen 2 or more times in the past year? No   Trouble with walking or balance? No          3/13/2025   Dental   Dentist two times every year?  "Yes           2/26/2024   TB Screening   Were you born outside of the [place]? No           Today's PHQ-2 Score:       3/18/2025     9:55 AM   PHQ-2 ( 1999 [employer])   Q1: Little interest or pleasure in doing things 2   Q2: Feeling down, depressed or hopeless 0   PHQ-2 Score 2    Q1: Little interest or pleasure in doing things More than half the days   Q2: Feeling down, depressed or hopeless Not at all   PHQ-2 Score 2       Patient-reported           3/13/2025   Substance Use   Alcohol more than 3/day or more than 7/wk No   Do you use any other substances recreationally? No     Social History     Tobacco Use    Smoking status: Never     Passive exposure: Never    Smokeless tobacco: Never   Vaping Use    Vaping status: Never Used   Substance Use Topics    Alcohol use: Never    Drug use: Never           3/13/2025   STI Screening   New sexual partner(s) since last STI/HIV test? No   Last PSA:   PSA   Date Value Ref Range Status   04/14/2021 0.44 0 - 4 ug/L Final     Comment:     Assay Method:  Chemiluminescence using Siemens Vista analyzer     ASCVD Risk   The 10-year ASCVD risk score (Rickey MORENO, et al., 2019) is: 19.8%    Values used to calculate the score:      Age: 60 years      Sex: Male      Is Non- : No      Diabetic: Yes      Tobacco smoker: No      Systolic Blood Pressure: 148 mmHg      Is BP treated: Yes      HDL Cholesterol: 33 mg/dL      Total Cholesterol: 131 mg/dL       Reviewed and updated as needed this visit by Provider                      Review of Systems  Constitutional, neuro, ENT, endocrine, pulmonary, cardiac, gastrointestinal, genitourinary, musculoskeletal, integument and psychiatric systems are negative, except as otherwise noted.     Objective    Exam  BP (!) 148/94 (BP Location: Left arm, Patient Position: Chair, Cuff Size: Adult Regular)   Pulse 90   Temp 97.5  F (36.4  C) (Tympanic)   Resp 16   Ht 1.778 m (5' 10\")   Wt 99.8 kg (220 lb)   SpO2 98%   BMI " "31.57 kg/m     Estimated body mass index is 31.57 kg/m  as calculated from the following:    Height as of this encounter: 1.778 m (5' 10\").    Weight as of this encounter: 99.8 kg (220 lb).    Physical Exam  GENERAL: alert and no distress  EYES: Eyes grossly normal to inspection  HENT: ear canals and TM's normal, nose and mouth without ulcers or lesions  NECK: no adenopathy, no asymmetry, masses, or scars  RESP: lungs clear to auscultation - no rales, rhonchi or wheezes  CV: regular rates and rhythm, normal S1 S2, no S3 or S4, and no murmur, click or rub  MS: no gross musculoskeletal defects noted, no edema  SKIN: no suspicious lesions or rashes  NEURO: Normal strength and tone, mentation intact and speech normal  PSYCH: mentation appears normal, affect normal/bright  Diabetic foot exam: normal DP and PT pulses, no trophic changes or ulcerative lesions, normal sensory exam, and normal monofilament exam        Signed Electronically by: Ana Maria Valentin PA-C    "

## 2025-03-31 ENCOUNTER — LAB (OUTPATIENT)
Dept: LAB | Facility: CLINIC | Age: 60
End: 2025-03-31
Payer: COMMERCIAL

## 2025-03-31 DIAGNOSIS — Z12.5 SCREENING PSA (PROSTATE SPECIFIC ANTIGEN): ICD-10-CM

## 2025-03-31 DIAGNOSIS — Z79.4 TYPE 2 DIABETES MELLITUS WITH MICROALBUMINURIA, WITH LONG-TERM CURRENT USE OF INSULIN (H): ICD-10-CM

## 2025-03-31 DIAGNOSIS — E11.3299 BACKGROUND DIABETIC RETINOPATHY (H): ICD-10-CM

## 2025-03-31 DIAGNOSIS — R80.9 TYPE 2 DIABETES MELLITUS WITH MICROALBUMINURIA, WITH LONG-TERM CURRENT USE OF INSULIN (H): ICD-10-CM

## 2025-03-31 DIAGNOSIS — N18.1 CHRONIC KIDNEY DISEASE, STAGE 1: ICD-10-CM

## 2025-03-31 DIAGNOSIS — N52.9 ERECTILE DYSFUNCTION, UNSPECIFIED ERECTILE DYSFUNCTION TYPE: ICD-10-CM

## 2025-03-31 DIAGNOSIS — E11.29 TYPE 2 DIABETES MELLITUS WITH MICROALBUMINURIA, WITH LONG-TERM CURRENT USE OF INSULIN (H): ICD-10-CM

## 2025-03-31 LAB
ALBUMIN SERPL BCG-MCNC: 4.1 G/DL (ref 3.5–5.2)
ALP SERPL-CCNC: 62 U/L (ref 40–150)
ALT SERPL W P-5'-P-CCNC: 27 U/L (ref 0–70)
ANION GAP SERPL CALCULATED.3IONS-SCNC: 13 MMOL/L (ref 7–15)
AST SERPL W P-5'-P-CCNC: 29 U/L (ref 0–45)
BILIRUB SERPL-MCNC: 0.6 MG/DL
BUN SERPL-MCNC: 15.7 MG/DL (ref 8–23)
CALCIUM SERPL-MCNC: 9.6 MG/DL (ref 8.8–10.4)
CHLORIDE SERPL-SCNC: 103 MMOL/L (ref 98–107)
CHOLEST SERPL-MCNC: 140 MG/DL
CREAT SERPL-MCNC: 0.8 MG/DL (ref 0.67–1.17)
CREAT UR-MCNC: 61 MG/DL
EGFRCR SERPLBLD CKD-EPI 2021: >90 ML/MIN/1.73M2
ERYTHROCYTE [DISTWIDTH] IN BLOOD BY AUTOMATED COUNT: 12.8 % (ref 10–15)
EST. AVERAGE GLUCOSE BLD GHB EST-MCNC: 226 MG/DL
FASTING STATUS PATIENT QL REPORTED: YES
FASTING STATUS PATIENT QL REPORTED: YES
GLUCOSE SERPL-MCNC: 191 MG/DL (ref 70–99)
HBA1C MFR BLD: 9.5 % (ref 0–5.6)
HCO3 SERPL-SCNC: 23 MMOL/L (ref 22–29)
HCT VFR BLD AUTO: 47.6 % (ref 40–53)
HDLC SERPL-MCNC: 34 MG/DL
HGB BLD-MCNC: 16.1 G/DL (ref 13.3–17.7)
LDLC SERPL CALC-MCNC: 85 MG/DL
MCH RBC QN AUTO: 28.4 PG (ref 26.5–33)
MCHC RBC AUTO-ENTMCNC: 33.8 G/DL (ref 31.5–36.5)
MCV RBC AUTO: 84 FL (ref 78–100)
MICROALBUMIN UR-MCNC: 215 MG/L
MICROALBUMIN/CREAT UR: 352.46 MG/G CR (ref 0–17)
NONHDLC SERPL-MCNC: 106 MG/DL
PLATELET # BLD AUTO: 139 10E3/UL (ref 150–450)
POTASSIUM SERPL-SCNC: 4.4 MMOL/L (ref 3.4–5.3)
PROT SERPL-MCNC: 6.9 G/DL (ref 6.4–8.3)
PSA SERPL DL<=0.01 NG/ML-MCNC: 0.41 NG/ML (ref 0–3.5)
RBC # BLD AUTO: 5.67 10E6/UL (ref 4.4–5.9)
SHBG SERPL-SCNC: 56 NMOL/L (ref 11–80)
SODIUM SERPL-SCNC: 139 MMOL/L (ref 135–145)
TRIGL SERPL-MCNC: 106 MG/DL
WBC # BLD AUTO: 5.6 10E3/UL (ref 4–11)

## 2025-03-31 PROCEDURE — 83036 HEMOGLOBIN GLYCOSYLATED A1C: CPT

## 2025-03-31 PROCEDURE — 36415 COLL VENOUS BLD VENIPUNCTURE: CPT

## 2025-03-31 PROCEDURE — 82570 ASSAY OF URINE CREATININE: CPT

## 2025-03-31 PROCEDURE — 82043 UR ALBUMIN QUANTITATIVE: CPT

## 2025-03-31 PROCEDURE — 80061 LIPID PANEL: CPT

## 2025-03-31 PROCEDURE — 84403 ASSAY OF TOTAL TESTOSTERONE: CPT

## 2025-03-31 PROCEDURE — 80053 COMPREHEN METABOLIC PANEL: CPT

## 2025-03-31 PROCEDURE — 84270 ASSAY OF SEX HORMONE GLOBUL: CPT

## 2025-03-31 PROCEDURE — G0103 PSA SCREENING: HCPCS

## 2025-03-31 PROCEDURE — 85027 COMPLETE CBC AUTOMATED: CPT

## 2025-04-01 ENCOUNTER — ORDERS ONLY (AUTO-RELEASED) (OUTPATIENT)
Dept: FAMILY MEDICINE | Facility: CLINIC | Age: 60
End: 2025-04-01
Payer: COMMERCIAL

## 2025-04-01 DIAGNOSIS — Z12.11 SCREEN FOR COLON CANCER: ICD-10-CM

## 2025-04-02 LAB
TESTOST FREE SERPL-MCNC: 7.13 NG/DL
TESTOST SERPL-MCNC: 487 NG/DL (ref 240–950)

## 2025-04-08 LAB — NONINV COLON CA DNA+OCC BLD SCRN STL QL: NORMAL

## 2025-04-13 NOTE — PROGRESS NOTES
Chief Complaint   Patient presents with    Consult      aplastic right frontal sinus. There is mild sinonasal mucosal thickening in the left maxillary sinus    Nasal Congestion     Chronic congestion x 1 year - uses Flonase.  At night worse     History of Present Illness   Miquel Llanes is a 60 year old male who presents today for evaluation. I am seeing this patient in consultation for chronic congestion of paranasal sinus at the request of the provider Valerie Lagunas PA-C.  The patient has been experiencing significant nasal obstruction and congestion, particularly at night or when lying down to sleep. These symptoms have led to frequent use of Vicks nasal decongestant spray, approximately 2-3 times daily, which he has been using for a couple of months. He reports that his nasal passages close up, making it difficult to breathe, and he experiences frequent nosebleeds, especially on the right side. The nasal congestion has also resulted in dry mouth and throat, affecting his sleep quality. He has tried using a neti pot and Flonase, which sometimes provides relief.     The patient underwent a sinus CT without contrast 2/24/2025. My review of the sinus CT shows an aplastic right frontal sinus. There is mild sinonasal mucosal thickening in the left maxillary sinus. Overall, the paranasal sinuses are clear without any evidence of acute or chronic inflammation.  The nasal septum deviates very slightly to the right anteriorly.  The inferior turbinates are minimally hypertrophied.  Gato also mentioned having diabetes, for which he takes insulin, Jardiance, and Ozempic, but he struggles with blood sugar control, noting fluctuations in his glucose levels. His blood pressure remains high despite medication adjustments.    He denies any significant rhinorrhea, postnasal drainage, taste or smell changes.  He is struggling with significant nasal obstruction and congestion that is worse when lying down and some  facial pressure.  No previous history of nose or sinus surgery.  No significant allergy history or previous allergy testing.    SNOT 22 Score: 21 (Moderate sinusitis symptoms)     NOSE Score: 220 (Severe nasal obstruction)    Past Medical History  Patient Active Problem List   Diagnosis    Type 2 diabetes mellitus with microalbuminuria, with long-term current use of insulin (H)    Hypertriglyceridemia    Polyneuropathy    Uncontrolled type 2 diabetes mellitus with hyperglycemia (H)    Chronic kidney disease, stage 1    Background diabetic retinopathy, mild, ou    Thrombocytopenia    Fatigue    Primary osteoarthritis of left knee    Erectile dysfunction, unspecified erectile dysfunction type    Essential hypertension     Current Medications     Current Outpatient Medications:     budesonide (PULMICORT) 0.5 MG/2ML neb solution, Place 0.5 mg/2 mL budesonide vial in 8 oz normal saline sinus rinse bottle.  Irrigate each nostril with one half of the bottle twice daily., Disp: 360 mL, Rfl: 3    insulin glargine (LANTUS PEN) 100 UNIT/ML pen, Inject 50 Units subcutaneously at bedtime., Disp: 60 mL, Rfl: 1    insulin pen needle (31G X 8 MM) 31G X 8 MM miscellaneous, Use 1 pen needles daily or as directed., Disp: 100 each, Rfl: 1    JARDIANCE 25 MG TABS tablet, Take 1 tablet (25 mg) by mouth daily., Disp: 90 tablet, Rfl: 3    lisinopril (ZESTRIL) 40 MG tablet, Take 1 tablet (40 mg) by mouth daily. - dose increase 3/18/25, Disp: 90 tablet, Rfl: 3    Semaglutide, 2 MG/DOSE, (OZEMPIC) 8 MG/3ML pen, Inject 2 mg subcutaneously every 7 days. - dose increase 11/4/24. LAST REFILL, Disp: 9 mL, Rfl: 0    sildenafil (REVATIO) 20 MG tablet, Take 2-5 tablets ( mg) by mouth daily as needed (take 30 mins to 4 hours before sexual activity)., Disp: 30 tablet, Rfl: 2    Current Facility-Administered Medications:     triamcinolone (KENALOG-40) injection 80 mg, 80 mg, Intramuscular, Once,     Allergies  Allergies   Allergen Reactions     Rosuvastatin Muscle Pain (Myalgia)    Atorvastatin Rash       Social History   Social History     Socioeconomic History    Marital status:    Tobacco Use    Smoking status: Never     Passive exposure: Never    Smokeless tobacco: Never   Vaping Use    Vaping status: Never Used   Substance and Sexual Activity    Alcohol use: Never    Drug use: Never    Sexual activity: Not Currently     Social Drivers of Health     Financial Resource Strain: Low Risk  (3/13/2025)    Financial Resource Strain     Within the past 12 months, have you or your family members you live with been unable to get utilities (heat, electricity) when it was really needed?: No   Food Insecurity: Low Risk  (3/13/2025)    Food Insecurity     Within the past 12 months, did you worry that your food would run out before you got money to buy more?: No     Within the past 12 months, did the food you bought just not last and you didn t have money to get more?: No   Transportation Needs: Low Risk  (3/13/2025)    Transportation Needs     Within the past 12 months, has lack of transportation kept you from medical appointments, getting your medicines, non-medical meetings or appointments, work, or from getting things that you need?: No   Physical Activity: Insufficiently Active (3/13/2025)    Exercise Vital Sign     Days of Exercise per Week: 1 day     Minutes of Exercise per Session: 30 min   Stress: No Stress Concern Present (3/13/2025)    [employer] - Occupational Stress Questionnaire     Feeling of Stress : Only a little   Social Connections: Unknown (3/13/2025)    Social Connection and Isolation Panel [NHANES]     Frequency of Social Gatherings with Friends and Family: Once a week   Interpersonal Safety: Low Risk  (3/18/2025)    Interpersonal Safety     Do you feel physically and emotionally safe where you currently live?: Yes     Within the past 12 months, have you been hit, slapped, kicked or otherwise physically hurt  by someone?: No     Within the past 12 months, have you been humiliated or emotionally abused in other ways by your partner or ex-partner?: No   Housing Stability: Low Risk  (3/13/2025)    Housing Stability     Do you have housing? : Yes     Are you worried about losing your housing?: No       Family History  Family History   Problem Relation Age of Onset    Substance Abuse Father     Glaucoma Maternal Grandmother        Review of Systems  As per HPI and PMHx, otherwise 10+ comprehensive system review is negative.    Physical Exam  BP (!) 152/88   Pulse 100   Resp 16   SpO2 98%   GENERAL: The patient is a pleasant, cooperative 60 year old male in no acute distress.  HEAD: Normocephalic, atraumatic. Hair and scalp are normal.  EYES: Pupils are equal, round, reactive to light and accommodation. Extraocular movements are intact. The sclera nonicteric without injection. The extraocular structures are normal.  EARS: Normal shape and symmetry. No tenderness when palpating the mastoid or tragal areas bilaterally. No mastoid erythema or fluctuance.   NOSE: Nares are patent.  Nasal mucosa is boggy and inflamed with sticky, inflammatory mucus.  Nasal septum deviates to the right anteriorly.  The patient has moderately severe inferior turban hypertrophy bilaterally.  No nasal cavity masses or polyps, mucopurulence on anterior rhinoscopy.  ORAL CAVITY: Lips are normal. Dentition is in mixed repair. Mucous membranes are dry. No oral cavity lesions.  NEUROLOGIC: Cranial nerves II through XII are grossly intact. Voice is strong. Patient is House-Brackmann I/VI bilaterally.  CARDIOVASCULAR: Extremities are warm and well-perfused. No significant peripheral edema.  RESPIRATORY: Patient has nonlabored breathing without cough, wheeze, stridor.  PSYCHIATRIC: Patient is alert and oriented. Mood and affect appear normal.  SKIN: Warm and dry. No scalp, face, or neck lesions noted.    Assessment and Plan     ICD-10-CM    1. Chronic  congestion of paranasal sinus  J32.9 Adult ENT  Referral     triamcinolone (KENALOG-40) injection 80 mg     budesonide (PULMICORT) 0.5 MG/2ML neb solution     CANCELED: NASAL ENDOSCOPY, DIAGNOSTIC        It was my pleasure seeing Miquel Llanes today in clinic.      Chronic congestion of paranasal sinus:  - Chronic congestion likely due to rhinitis medicamentosa from overuse of Vicks nasal decongestant spray. CT scan of sinuses showed no signs of sinus infection, and nasal septum is reasonably straight with slight deviation to the right anteriorly.  - Discontinue Vicks nasal spray. Administer Kenalog injection 80 mg IM to reduce nasal inflammation. Start budesonide irrigations using a nasal rinse with distilled water and salt packets. [facility] with symptom surveys.  - Risks and side effects: We discussed the risks, benefits, options, goals of a intramuscular Kenalog injection today in office including, but not limited to: Risk of pain at injection site, risk of infection, side effects of systemic steroids including blood pressure problems, insulin resistance, weight gain, bone/joint issues, mood alteration. The patient voiced understanding and is willing to proceed.    Gato to follow up with Primary Care provider regarding elevated blood pressure.    Silvio Abbott MD  Department of Otolaryngology-Head and Neck Surgery  [facility]

## 2025-04-15 ENCOUNTER — MYC MEDICAL ADVICE (OUTPATIENT)
Dept: ENDOCRINOLOGY | Facility: CLINIC | Age: 60
End: 2025-04-15
Payer: COMMERCIAL

## 2025-04-15 NOTE — PROGRESS NOTES
Outcome for 04/15/25 7:52 AM: Device upload instructions sent to patient via [employer] - meter  Eva Nick CMA  Adult Endocrinology  [facility] [place]

## 2025-04-16 ENCOUNTER — OFFICE VISIT (OUTPATIENT)
Dept: OTOLARYNGOLOGY | Facility: CLINIC | Age: 60
End: 2025-04-16
Attending: PHYSICIAN ASSISTANT
Payer: COMMERCIAL

## 2025-04-16 ENCOUNTER — MYC MEDICAL ADVICE (OUTPATIENT)
Dept: OTOLARYNGOLOGY | Facility: CLINIC | Age: 60
End: 2025-04-16

## 2025-04-16 VITALS
DIASTOLIC BLOOD PRESSURE: 88 MMHG | HEART RATE: 100 BPM | OXYGEN SATURATION: 98 % | SYSTOLIC BLOOD PRESSURE: 152 MMHG | RESPIRATION RATE: 16 BRPM

## 2025-04-16 DIAGNOSIS — J32.9 CHRONIC CONGESTION OF PARANASAL SINUS: ICD-10-CM

## 2025-04-16 RX ORDER — BUDESONIDE 0.5 MG/2ML
INHALANT ORAL
Qty: 360 ML | Refills: 3 | Status: SHIPPED | OUTPATIENT
Start: 2025-04-16

## 2025-04-16 RX ORDER — TRIAMCINOLONE ACETONIDE 40 MG/ML
80 INJECTION, SUSPENSION INTRA-ARTICULAR; INTRAMUSCULAR ONCE
Status: ACTIVE | OUTPATIENT
Start: 2025-04-16

## 2025-04-16 NOTE — PATIENT INSTRUCTIONS
BUDESONIDE IRRIGATION INSTRUCTIONS    You will be starting Budesonide nasal irrigations and will need to obtain the following:      - NeilMed Sinus Rinse 8 oz Kit  - Distilled or filtered water   - Normal saline salt packets  - Budesonide medication     Place filtered or distilled water into the NeilMed bottle up to the fill line (DO NOT USE TAP OR WELL WATER). Place the pre-made salt packet in the 8 oz of saline. Then place the prescription budesonide medication into the bottle. Shake the bottle to suspend into solution. Lean head forward over a sink or a basin. Rinse each side of the nose with one-half of the bottle (each squeeze is about 4 ounces, or one-half of the bottle). Rinse the nose twice daily.     You will follow up with your ENT surgeon in 6-12 weeks to recheck your symptoms. If you are having problems with your irrigations or problems obtaining the medication, please contact your ENT surgeon.    Video example: [email or web address]

## 2025-04-16 NOTE — LETTER
4/16/2025      Miquel Llanes  [address] [facility] [address]      Dear Colleague,    Thank you for referring your patient, Miquel Llanes, to the [facility]. Please see a copy of my visit note below.    Chief Complaint   Patient presents with     Consult      aplastic right frontal sinus. There is mild sinonasal mucosal thickening in the left maxillary sinus     Nasal Congestion     Chronic congestion x 1 year - uses Flonase.  At night worse     History of Present Illness   Miquel Llanes is a 60 year old male who presents today for evaluation. I am seeing this patient in consultation for chronic congestion of paranasal sinus at the request of the provider Valerie Lagunas PA-C.  The patient has been experiencing significant nasal obstruction and congestion, particularly at night or when lying down to sleep. These symptoms have led to frequent use of Vicks nasal decongestant spray, approximately 2-3 times daily, which he has been using for a couple of months. He reports that his nasal passages close up, making it difficult to breathe, and he experiences frequent nosebleeds, especially on the right side. The nasal congestion has also resulted in dry mouth and throat, affecting his sleep quality. He has tried using a neti pot and Flonase, which sometimes provides relief.     The patient underwent a sinus CT without contrast 2/24/2025. My review of the sinus CT shows an aplastic right frontal sinus. There is mild sinonasal mucosal thickening in the left maxillary sinus. Overall, the paranasal sinuses are clear without any evidence of acute or chronic inflammation.  The nasal septum deviates very slightly to the right anteriorly.  The inferior turbinates are minimally hypertrophied.  Gato also mentioned having diabetes, for which he takes insulin, Jardiance, and Ozempic, but he struggles with blood sugar control, noting fluctuations in his glucose levels. His blood pressure  remains high despite medication adjustments.    He denies any significant rhinorrhea, postnasal drainage, taste or smell changes.  He is struggling with significant nasal obstruction and congestion that is worse when lying down and some facial pressure.  No previous history of nose or sinus surgery.  No significant allergy history or previous allergy testing.    SNOT 22 Score: 21 (Moderate sinusitis symptoms)     NOSE Score: 220 (Severe nasal obstruction)    Past Medical History  Patient Active Problem List   Diagnosis     Type 2 diabetes mellitus with microalbuminuria, with long-term current use of insulin (H)     Hypertriglyceridemia     Polyneuropathy     Uncontrolled type 2 diabetes mellitus with hyperglycemia (H)     Chronic kidney disease, stage 1     Background diabetic retinopathy, mild, ou     Thrombocytopenia     Fatigue     Primary osteoarthritis of left knee     Erectile dysfunction, unspecified erectile dysfunction type     Essential hypertension     Current Medications     Current Outpatient Medications:      budesonide (PULMICORT) 0.5 MG/2ML neb solution, Place 0.5 mg/2 mL budesonide vial in 8 oz normal saline sinus rinse bottle.  Irrigate each nostril with one half of the bottle twice daily., Disp: 360 mL, Rfl: 3     insulin glargine (LANTUS PEN) 100 UNIT/ML pen, Inject 50 Units subcutaneously at bedtime., Disp: 60 mL, Rfl: 1     insulin pen needle (31G X 8 MM) 31G X 8 MM miscellaneous, Use 1 pen needles daily or as directed., Disp: 100 each, Rfl: 1     JARDIANCE 25 MG TABS tablet, Take 1 tablet (25 mg) by mouth daily., Disp: 90 tablet, Rfl: 3     lisinopril (ZESTRIL) 40 MG tablet, Take 1 tablet (40 mg) by mouth daily. - dose increase 3/18/25, Disp: 90 tablet, Rfl: 3     Semaglutide, 2 MG/DOSE, (OZEMPIC) 8 MG/3ML pen, Inject 2 mg subcutaneously every 7 days. - dose increase 11/4/24. LAST REFILL, Disp: 9 mL, Rfl: 0     sildenafil (REVATIO) 20 MG tablet, Take 2-5 tablets ( mg) by mouth daily as  needed (take 30 mins to 4 hours before sexual activity)., Disp: 30 tablet, Rfl: 2    Current Facility-Administered Medications:      triamcinolone (KENALOG-40) injection 80 mg, 80 mg, Intramuscular, Once,     Allergies  Allergies   Allergen Reactions     Rosuvastatin Muscle Pain (Myalgia)     Atorvastatin Rash       Social History   Social History     Socioeconomic History     Marital status:    Tobacco Use     Smoking status: Never     Passive exposure: Never     Smokeless tobacco: Never   Vaping Use     Vaping status: Never Used   Substance and Sexual Activity     Alcohol use: Never     Drug use: Never     Sexual activity: Not Currently     Social Drivers of Health     Financial Resource Strain: Low Risk  (3/13/2025)    Financial Resource Strain      Within the past 12 months, have you or your family members you live with been unable to get utilities (heat, electricity) when it was really needed?: No   Food Insecurity: Low Risk  (3/13/2025)    Food Insecurity      Within the past 12 months, did you worry that your food would run out before you got money to buy more?: No      Within the past 12 months, did the food you bought just not last and you didn t have money to get more?: No   Transportation Needs: Low Risk  (3/13/2025)    Transportation Needs      Within the past 12 months, has lack of transportation kept you from medical appointments, getting your medicines, non-medical meetings or appointments, work, or from getting things that you need?: No   Physical Activity: Insufficiently Active (3/13/2025)    Exercise Vital Sign      Days of Exercise per Week: 1 day      Minutes of Exercise per Session: 30 min   Stress: No Stress Concern Present (3/13/2025)    [employer] [employer] - Occupational Stress Questionnaire      Feeling of Stress : Only a little   Social Connections: Unknown (3/13/2025)    Social Connection and Isolation Panel [NHANES]      Frequency of Social Gatherings with  Friends and Family: Once a week   Interpersonal Safety: Low Risk  (3/18/2025)    Interpersonal Safety      Do you feel physically and emotionally safe where you currently live?: Yes      Within the past 12 months, have you been hit, slapped, kicked or otherwise physically hurt by someone?: No      Within the past 12 months, have you been humiliated or emotionally abused in other ways by your partner or ex-partner?: No   Housing Stability: Low Risk  (3/13/2025)    Housing Stability      Do you have housing? : Yes      Are you worried about losing your housing?: No       Family History  Family History   Problem Relation Age of Onset     Substance Abuse Father      Glaucoma Maternal Grandmother        Review of Systems  As per HPI and PMHx, otherwise 10+ comprehensive system review is negative.    Physical Exam  BP (!) 152/88   Pulse 100   Resp 16   SpO2 98%   GENERAL: The patient is a pleasant, cooperative 60 year old male in no acute distress.  HEAD: Normocephalic, atraumatic. Hair and scalp are normal.  EYES: Pupils are equal, round, reactive to light and accommodation. Extraocular movements are intact. The sclera nonicteric without injection. The extraocular structures are normal.  EARS: Normal shape and symmetry. No tenderness when palpating the mastoid or tragal areas bilaterally. No mastoid erythema or fluctuance.   NOSE: Nares are patent.  Nasal mucosa is boggy and inflamed with sticky, inflammatory mucus.  Nasal septum deviates to the right anteriorly.  The patient has moderately severe inferior turban hypertrophy bilaterally.  No nasal cavity masses or polyps, mucopurulence on anterior rhinoscopy.  ORAL CAVITY: Lips are normal. Dentition is in mixed repair. Mucous membranes are dry. No oral cavity lesions.  NEUROLOGIC: Cranial nerves II through XII are grossly intact. Voice is strong. Patient is House-Brackmann I/VI bilaterally.  CARDIOVASCULAR: Extremities are warm and well-perfused. No significant  peripheral edema.  RESPIRATORY: Patient has nonlabored breathing without cough, wheeze, stridor.  PSYCHIATRIC: Patient is alert and oriented. Mood and affect appear normal.  SKIN: Warm and dry. No scalp, face, or neck lesions noted.    Assessment and Plan     ICD-10-CM    1. Chronic congestion of paranasal sinus  J32.9 Adult ENT  Referral     triamcinolone (KENALOG-40) injection 80 mg     budesonide (PULMICORT) 0.5 MG/2ML neb solution     CANCELED: NASAL ENDOSCOPY, DIAGNOSTIC        It was my pleasure seeing Miquel Llanes today in clinic.      Chronic congestion of paranasal sinus:  - Chronic congestion likely due to rhinitis medicamentosa from overuse of Vicks nasal decongestant spray. CT scan of sinuses showed no signs of sinus infection, and nasal septum is reasonably straight with slight deviation to the right anteriorly.  - Discontinue Vicks nasal spray. Administer Kenalog injection 80 mg IM to reduce nasal inflammation. Start budesonide irrigations using a nasal rinse with distilled water and salt packets. [facility] 4-6 weeks with symptom surveys.  - Risks and side effects: We discussed the risks, benefits, options, goals of a intramuscular Kenalog injection today in office including, but not limited to: Risk of pain at injection site, risk of infection, side effects of systemic steroids including blood pressure problems, insulin resistance, weight gain, bone/joint issues, mood alteration. The patient voiced understanding and is willing to proceed.    Gato to follow up with Primary Care provider regarding elevated blood pressure.    Silvio Abbott MD  Department of Otolaryngology-Head and Neck Surgery  [facility]       Again, thank you for allowing me to participate in the care of your patient.        Sincerely,        Silvio Abbott MD    Electronically signed

## 2025-04-17 ENCOUNTER — OFFICE VISIT (OUTPATIENT)
Dept: ENDOCRINOLOGY | Facility: CLINIC | Age: 60
End: 2025-04-17
Payer: COMMERCIAL

## 2025-04-17 VITALS
OXYGEN SATURATION: 96 % | WEIGHT: 220.46 LBS | BODY MASS INDEX: 31.63 KG/M2 | HEART RATE: 94 BPM | RESPIRATION RATE: 16 BRPM | SYSTOLIC BLOOD PRESSURE: 158 MMHG | DIASTOLIC BLOOD PRESSURE: 93 MMHG

## 2025-04-17 DIAGNOSIS — Z79.4 TYPE 2 DIABETES MELLITUS WITH MICROALBUMINURIA, WITH LONG-TERM CURRENT USE OF INSULIN (H): ICD-10-CM

## 2025-04-17 DIAGNOSIS — E11.29 TYPE 2 DIABETES MELLITUS WITH MICROALBUMINURIA, WITH LONG-TERM CURRENT USE OF INSULIN (H): ICD-10-CM

## 2025-04-17 DIAGNOSIS — E66.811 CLASS 1 OBESITY DUE TO EXCESS CALORIES WITH SERIOUS COMORBIDITY AND BODY MASS INDEX (BMI) OF 31.0 TO 31.9 IN ADULT: Primary | ICD-10-CM

## 2025-04-17 DIAGNOSIS — R80.9 TYPE 2 DIABETES MELLITUS WITH MICROALBUMINURIA, WITH LONG-TERM CURRENT USE OF INSULIN (H): ICD-10-CM

## 2025-04-17 DIAGNOSIS — E66.09 CLASS 1 OBESITY DUE TO EXCESS CALORIES WITH SERIOUS COMORBIDITY AND BODY MASS INDEX (BMI) OF 31.0 TO 31.9 IN ADULT: Primary | ICD-10-CM

## 2025-04-17 DIAGNOSIS — E11.65 UNCONTROLLED TYPE 2 DIABETES MELLITUS WITH HYPERGLYCEMIA (H): ICD-10-CM

## 2025-04-17 NOTE — PATIENT INSTRUCTIONS
Welcome to the [facility] Endocrinology and Diabetes Clinics     Our Endocrinology Clinics are here to provide you with a team-based, collaborative approach in the diagnosis and treatment of patients with diabetes and endocrine disorders. The team is made up of Physicians, Physician Assistants, Certified Diabetes Educators, Registered Nurses, Medical Assistants, Emergency Medical Technicians, and many others, all of whom have the unified goal of providing our patients with high quality care.     Please see below for some helpful tips to best navigate and use the [facility] Endocrinology clinic:     Ada Respect: At [facility], we are committed to a respectful and safe space for all patients, visitors, and staff.  We believe that mutual respect between patients and their care team is the foundation of quality care.  It is our expectation that you will be treated with respect by your care team.  In turn, we ask that all communication with the care team (written and verbal) be respectful and free from profanity, threatening, or abusive language.  Disrespectful communication undermines our therapeutic relationship with you and may result in us being unable to continue to provide your care.    Refills: A provider must see you at least annually to prescribe and refill medications. This is to ensure your safety as well as meet insurance and compliance regulations.    Scheduling: Many of our Providers offer both in-person or video visits. Please call to schedule any needed follow ups as soon as possible because our provider schedules fill up very quickly. Our care team has the right to require an in-person visit when they believe that it is medically necessary. Please remember that for any virtual visits, you must be in the [facility] at the time of the visit, otherwise we are unable to see you and you will need to be rescheduled.    Missed Appointments: If you need to cancel or miss your  scheduled appointment, please call the clinic at [phone] to reschedule.  Please note if you repeatedly miss appointments or repeatedly miss appointments without calling to inform us ahead of time (no-show), the clinic may elect to not allow you to reschedule without speaking to a manager, may require a Partnership In Care Agreement prior to rescheduling, or could result in you no longer being able to receive care from the clinic. Providing the clinic with timely notification if you have to miss an appointment, allows us to better serve the needs of all of our patients.    Primary Care Provider: Our Endocrinologists are Specialists in their field. We expect you to have a Primary Care Provider established to handle any needs outside of your diabetes and endocrine care.  We would be happy to assist you find a Primary Care Provider, if you do not have one.    Yunno: Yunno is a wonderful resource that allows you access to your Care Team via online or the kun. Please ask a member of the team if you would like help creating an account. Please note that it may take up to 2 business days for a response. Yunno messages are not reviewed on weekends or after business hours.  Emergent or urgent care needs should never be communicated via Yunno.  If you experience a medical emergency call 911 or go to the nearest emergency room.    Labs: It is recommended that you stay within the [facility] for labs but you are welcome to obtain ordered labs (with some exceptions) from any location of your choice as long as they are able to complete and process the needed labs. If you need us to fax orders to your preferred lab, please provide us the name and fax number of the lab you would like to go to so we can fax the orders. If your labs are drawn outside of the [facility], please have them fax the results to [phone] ([place]) or [phone] ([place]) or via [facility]. It is your  responsibility to ensure that outside lab results are sent to us.    We look forward to working with you. Please do not hesitate to reach out with any questions.    Thank you,    The Endocrine Team    [facility] Address:   [place] Address:     [place] [address]    Phone: [phone]  Fax: [phone]   [address] [place] [address]    Phone: [phone]  Fax: [phone]     [facility] Cost Estimate Phone Number: [phone]    General Lab and Imaging Scheduling Phone Number: [phone]      If you are interested in exploring research opportunities in the Division of Diabetes, Endocrinology and Metabolism and within the [facility] you are welcome to view the following QR codes by scanning them using your phone's camera to access a link to more information.  Participating in a research study is voluntary. Your decision whether or not to participate will not affect your current or future relations with the [facility] or [facility]. Current available studies are:     Type 1 Diabetes  Adults     Pediatrics     Type 2 Diabetes  Weight Loss - People Treated with Diet or Metformin Only     Pediatrics and Young Adults

## 2025-04-17 NOTE — LETTER
4/17/2025      Miquel Llanes  [address] [facility] [address]      Dear Colleague,    Thank you for referring your patient, Miquel Llanes, to the [facility]. Please see a copy of my visit note below.    Outcome for 04/15/25 7:52 AM: Device upload instructions sent to patient via Savedailyhart - meter  Eva Nick CMA  Adult Endocrinology  [facility] [place]      Assessment/Plan :   Type 2 DM. Gato knows that he needs to get his blood sugars under better control but he just doesn't feel like anything is helping. He has continued to take his medication, every day, as directed, but his A1C is still too elevated. He has also made changes to his diet and lost weight. We discussed his current medication and I would like to try sending in a new prescription for Mounjaro 5 mg weekly. He will take this in place of the Ozempic. We will plan on increasing the dose by 2.5 mg monthly until we reach the max dose of 15 mg weekly. I am hopeful that the Mounjaro will continue to help with weight loss and that it will also improve his blood sugars. If he has any problems picking up the medication, he will contact our office. We will follow-up in 6 mos.       I have independently reviewed and interpreted labs, imaging as indicated.      Chief complaint:  Miquel is a 60 year old male who returns for follow-up of type 2 diabetes.    I have reviewed Care Everywhere including [facility], [facility], [facility], [facility], [facility], [facility] , [facility], [facility] lab reports, imaging reports and provider notes as indicated.      HISTORY OF PRESENT ILLNESS  Gato is frustrated with his blood sugars. He feels like the 2 mg weekly Ozempic has helped with appetite suppression. He tries to focus on eating low carbohydrate high protein meals. He has even lost weight since our last visit but his A1C remains elevated. Along with the weekly Ozempic, he has continued to take 25 mg of Jardiance daily  and 50 unit(s) of Lantus at bedtime. He doesn't know what more he can do to improve his blood sugars.    Gato uses the [place] glucometer to monitor his blood sugars every morning. He feels like the numbers never make sense. He can eat a salad for dinner and his morning blood sugar will be up over 250 mg/dl. The next day he can have a hamburger with no sugar soda and his morning blood sugar will be 115 mg/dl. He has not had any signs or symptoms of severe hyperglycemia and/or hypoglycemia but he is frustrated that he just can't seem to get his blood sugars to stabilize.     Gato has had some nausea with the Ozempic. It feels like it is manageable but annoying. He has not had any issues with blurred vision or an increase in numbness/tingling in his feet. He also feels like he has been sleeping better since he was able to lose weight.      Gato was diagnosed with diabetes over 15 yrs ago. Over the years he has taken a variety of oral medications and insulin but he has been unable to get his A1C consistently under 9%. He worries about medication side effects and he feels like he has trouble tolerating most medication. He has been on a combination of insulin and metformin for many years but recently seemed to develop more upset stomach with metformin, so he cut back down to 1 tablet daily. He was also taking simvastatin for the last year but recently developed muscle cramps and had to stop the medication. His diabetes is complicated by CKD stage 1, hyperlipidemia, and osteoarthritis.     Endocrine relevant labs are as follows:   Latest Reference Range & Units 03/31/25 07:15   Hemoglobin A1C 0.0 - 5.6 % 9.5 (H)   (H): Data is abnormally high   Latest Reference Range & Units 03/31/25 07:50   Albumin Urine mg/g Cr 0.00 - 17.00 mg/g Cr 352.46 (H)   (H): Data is abnormally high   Latest Reference Range & Units 03/31/25 07:50   Albumin Urine mg/L mg/L 215.0      Latest Reference Range & Units 10/28/24 07:44   Hemoglobin  A1C 0.0 - 5.6 % 9.2 (H)   (H): Data is abnormally high    REVIEW OF SYSTEMS    Endocrine: positive for diabetes and obesity  Skin: negative  Eyes: negative for, visual blurring, redness, tearing  Ears/Nose/Throat: negative  Respiratory: No shortness of breath, dyspnea on exertion, cough, or hemoptysis  Cardiovascular: negative for, chest pain, dyspnea on exertion, lower extremity edema, and exercise intolerance  Gastrointestinal: negative for, nausea, vomiting, constipation, and diarrhea  Genitourinary: negative for, nocturia, dysuria, frequency, and urgency  Musculoskeletal: negative for, muscular weakness, nocturnal cramping, and foot pain  Neurologic: negative for, local weakness, numbness or tingling of hands, and numbness or tingling of feet  Psychiatric: negative  Hematologic/Lymphatic/Immunologic: negative    Past Medical History  Past Medical History:   Diagnosis Date     Arthritis      Background diabetic retinopathy, mild, ou 10/15/2022     Diabetes (H)      Hypertension        Medications  Current Outpatient Medications   Medication Sig Dispense Refill     budesonide (PULMICORT) 0.5 MG/2ML neb solution Place 0.5 mg/2 mL budesonide vial in 8 oz normal saline sinus rinse bottle.  Irrigate each nostril with one half of the bottle twice daily. 360 mL 3     insulin glargine (LANTUS PEN) 100 UNIT/ML pen Inject 50 Units subcutaneously at bedtime. 60 mL 1     insulin pen needle (31G X 8 MM) 31G X 8 MM miscellaneous Use 1 pen needles daily or as directed. 100 each 1     JARDIANCE 25 MG TABS tablet Take 1 tablet (25 mg) by mouth daily. 90 tablet 3     lisinopril (ZESTRIL) 40 MG tablet Take 1 tablet (40 mg) by mouth daily. - dose increase 3/18/25 90 tablet 3     Semaglutide, 2 MG/DOSE, (OZEMPIC) 8 MG/3ML pen Inject 2 mg subcutaneously every 7 days. - dose increase 11/4/24. LAST REFILL 9 mL 0     sildenafil (REVATIO) 20 MG tablet Take 2-5 tablets ( mg) by mouth daily as needed (take 30 mins to 4 hours before  sexual activity). 30 tablet 2       Allergies  Allergies   Allergen Reactions     Rosuvastatin Muscle Pain (Myalgia)     Atorvastatin Rash         Family History  family history includes Glaucoma in his maternal grandmother; Substance Abuse in his father.    Social History  Social History     Tobacco Use     Smoking status: Never     Passive exposure: Never     Smokeless tobacco: Never   Vaping Use     Vaping status: Never Used   Substance Use Topics     Alcohol use: Never     Drug use: Never       Physical Exam  BP (!) 158/93   Pulse 94   Resp 16   Wt 100 kg (220 lb 7.4 oz)   SpO2 96%   BMI 31.63 kg/m    Body mass index is 31.63 kg/m .  GENERAL :  In no apparent distress  SKIN: Normal color, normal temperature, texture.  No hirsutism, alopecia or purple striae.     EYES: PERRL, EOMI, No scleral icterus,  No proptosis, conjunctival redness, stare, retraction  RESP: Lungs clear to auscultation bilaterally  CARDIAC: Regular rate and rhythm, normal S1 S2, without murmurs, rubs or gallops    NEURO: awake, alert, responds appropriately to questions.  Cranial nerves intact.   Moves all extremities; Gait normal.  No tremor of the outstretched hand.    EXTREMITIES: No clubbing, cyanosis or edema.    DATA REVIEW  Livongo 90 day  Time in target range 2%  High 98%  Current Ave  mg/dl         Again, thank you for allowing me to participate in the care of your patient.        Sincerely,        Marylou Sawyer PA-C    Electronically signed

## 2025-04-17 NOTE — NURSING NOTE
Miquel Llanes's goals for this visit include:   Chief Complaint   Patient presents with    Follow Up    Diabetes       He requests these members of his care team be copied on today's visit information: yes    PCP: Ana Maria Valentin    Referring Provider:  Referred Self, MD  No address on file    BP (!) 173/95   Pulse 96   Resp 16   Wt 100 kg (220 lb 7.4 oz)   SpO2 96%   BMI 31.63 kg/m      Do you need any medication refills at today's visit? Yes    Lester Marrero, EMT

## 2025-04-17 NOTE — PROGRESS NOTES
Assessment/Plan :   Type 2 DM. Gato knows that he needs to get his blood sugars under better control but he just doesn't feel like anything is helping. He has continued to take his medication, every day, as directed, but his A1C is still too elevated. He has also made changes to his diet and lost weight. We discussed his current medication and I would like to try sending in a new prescription for Mounjaro 5 mg weekly. He will take this in place of the Ozempic. We will plan on increasing the dose by 2.5 mg monthly until we reach the max dose of 15 mg weekly. I am hopeful that the Mounjaro will continue to help with weight loss and that it will also improve his blood sugars. If he has any problems picking up the medication, he will contact our office. We will follow-up in 6 mos.       I have independently reviewed and interpreted labs, imaging as indicated.      Chief complaint:  Miquel is a 60 year old male who returns for follow-up of type 2 diabetes.    I have reviewed Care Everywhere including [facility], [facility], [facility], [facility] , [facility], [facility] lab reports, imaging reports and provider notes as indicated.      HISTORY OF PRESENT ILLNESS  Gato is frustrated with his blood sugars. He feels like the 2 mg weekly Ozempic has helped with appetite suppression. He tries to focus on eating low carbohydrate high protein meals. He has even lost weight since our last visit but his A1C remains elevated. Along with the weekly Ozempic, he has continued to take 25 mg of Jardiance daily and 50 unit(s) of Lantus at bedtime. He doesn't know what more he can do to improve his blood sugars.    Gato uses the [employer] glucometer to monitor his blood sugars every morning. He feels like the numbers never make sense. He can eat a salad for dinner and his morning blood sugar will be up over 250 mg/dl. The next day he can have a hamburger with no sugar soda and his morning blood sugar will  be 115 mg/dl. He has not had any signs or symptoms of severe hyperglycemia and/or hypoglycemia but he is frustrated that he just can't seem to get his blood sugars to stabilize.     Gato has had some nausea with the Ozempic. It feels like it is manageable but annoying. He has not had any issues with blurred vision or an increase in numbness/tingling in his feet. He also feels like he has been sleeping better since he was able to lose weight.      Gato was diagnosed with diabetes over 15 yrs ago. Over the years he has taken a variety of oral medications and insulin but he has been unable to get his A1C consistently under 9%. He worries about medication side effects and he feels like he has trouble tolerating most medication. He has been on a combination of insulin and metformin for many years but recently seemed to develop more upset stomach with metformin, so he cut back down to 1 tablet daily. He was also taking simvastatin for the last year but recently developed muscle cramps and had to stop the medication. His diabetes is complicated by CKD stage 1, hyperlipidemia, and osteoarthritis.     Endocrine relevant labs are as follows:   Latest Reference Range & Units 03/31/25 07:15   Hemoglobin A1C 0.0 - 5.6 % 9.5 (H)   (H): Data is abnormally high   Latest Reference Range & Units 03/31/25 07:50   Albumin Urine mg/g Cr 0.00 - 17.00 mg/g Cr 352.46 (H)   (H): Data is abnormally high   Latest Reference Range & Units 03/31/25 07:50   Albumin Urine mg/L mg/L 215.0      Latest Reference Range & Units 10/28/24 07:44   Hemoglobin A1C 0.0 - 5.6 % 9.2 (H)   (H): Data is abnormally high    REVIEW OF SYSTEMS    Endocrine: positive for diabetes and obesity  Skin: negative  Eyes: negative for, visual blurring, redness, tearing  Ears/Nose/Throat: negative  Respiratory: No shortness of breath, dyspnea on exertion, cough, or hemoptysis  Cardiovascular: negative for, chest pain, dyspnea on exertion, lower extremity edema, and exercise  intolerance  Gastrointestinal: negative for, nausea, vomiting, constipation, and diarrhea  Genitourinary: negative for, nocturia, dysuria, frequency, and urgency  Musculoskeletal: negative for, muscular weakness, nocturnal cramping, and foot pain  Neurologic: negative for, local weakness, numbness or tingling of hands, and numbness or tingling of feet  Psychiatric: negative  Hematologic/Lymphatic/Immunologic: negative    Past Medical History  Past Medical History:   Diagnosis Date    Arthritis     Background diabetic retinopathy, mild, ou 10/15/2022    Diabetes (H)     Hypertension        Medications  Current Outpatient Medications   Medication Sig Dispense Refill    budesonide (PULMICORT) 0.5 MG/2ML neb solution Place 0.5 mg/2 mL budesonide vial in 8 oz normal saline sinus rinse bottle.  Irrigate each nostril with one half of the bottle twice daily. 360 mL 3    insulin glargine (LANTUS PEN) 100 UNIT/ML pen Inject 50 Units subcutaneously at bedtime. 60 mL 1    insulin pen needle (31G X 8 MM) 31G X 8 MM miscellaneous Use 1 pen needles daily or as directed. 100 each 1    JARDIANCE 25 MG TABS tablet Take 1 tablet (25 mg) by mouth daily. 90 tablet 3    lisinopril (ZESTRIL) 40 MG tablet Take 1 tablet (40 mg) by mouth daily. - dose increase 3/18/25 90 tablet 3    Semaglutide, 2 MG/DOSE, (OZEMPIC) 8 MG/3ML pen Inject 2 mg subcutaneously every 7 days. - dose increase 11/4/24. LAST REFILL 9 mL 0    sildenafil (REVATIO) 20 MG tablet Take 2-5 tablets ( mg) by mouth daily as needed (take 30 mins to 4 hours before sexual activity). 30 tablet 2       Allergies  Allergies   Allergen Reactions    Rosuvastatin Muscle Pain (Myalgia)    Atorvastatin Rash         Family History  family history includes Glaucoma in his maternal grandmother; Substance Abuse in his father.    Social History  Social History     Tobacco Use    Smoking status: Never     Passive exposure: Never    Smokeless tobacco: Never   Vaping Use    Vaping status:  Never Used   Substance Use Topics    Alcohol use: Never    Drug use: Never       Physical Exam  BP (!) 158/93   Pulse 94   Resp 16   Wt 100 kg (220 lb 7.4 oz)   SpO2 96%   BMI 31.63 kg/m    Body mass index is 31.63 kg/m .  GENERAL :  In no apparent distress  SKIN: Normal color, normal temperature, texture.  No hirsutism, alopecia or purple striae.     EYES: PERRL, EOMI, No scleral icterus,  No proptosis, conjunctival redness, stare, retraction  RESP: Lungs clear to auscultation bilaterally  CARDIAC: Regular rate and rhythm, normal S1 S2, without murmurs, rubs or gallops    NEURO: awake, alert, responds appropriately to questions.  Cranial nerves intact.   Moves all extremities; Gait normal.  No tremor of the outstretched hand.    EXTREMITIES: No clubbing, cyanosis or edema.    DATA REVIEW  [facility] 90 day  Time in target range 2%  High 98%  Current Ave  mg/dl

## 2025-04-24 ENCOUNTER — MYC MEDICAL ADVICE (OUTPATIENT)
Dept: OTOLARYNGOLOGY | Facility: CLINIC | Age: 60
End: 2025-04-24
Payer: COMMERCIAL

## 2025-04-24 DIAGNOSIS — J32.9 CHRONIC CONGESTION OF PARANASAL SINUS: Primary | ICD-10-CM

## 2025-04-27 LAB — NONINV COLON CA DNA+OCC BLD SCRN STL QL: NEGATIVE

## 2025-05-19 ENCOUNTER — MYC MEDICAL ADVICE (OUTPATIENT)
Dept: FAMILY MEDICINE | Facility: CLINIC | Age: 60
End: 2025-05-19
Payer: COMMERCIAL

## 2025-05-20 NOTE — TELEPHONE ENCOUNTER
Recommend he come in for a blood pressure check on ancillary - bring home device with for comparison  Please assist with scheduling

## 2025-05-21 NOTE — TELEPHONE ENCOUNTER
Called patient to schedule, he stated he would like to speak to a nurse about his BP.     Call was transferred to RN jamilah Browning  Lead    [facility]

## 2025-05-21 NOTE — TELEPHONE ENCOUNTER
Patients blood pressure has since come down between 140-150's. No s/s.    Pt states he noticed he was experiencing side efffects from the compounded medication that contains Budesonide for his sharonda pot. Pt was experiencing dry mouth, muscle craps, and also high BP after using med. It appears high BP and muscle spasms are fairly common side effects.     Pt will stop using this medication.    Scheduled BP check 5/28. Pt will bring home monitor to compare.    Pt is also going to make a plan to start regular exercise.    Kelly Tovar RN on 5/21/2025 at 10:24 AM

## 2025-05-28 ENCOUNTER — ALLIED HEALTH/NURSE VISIT (OUTPATIENT)
Dept: FAMILY MEDICINE | Facility: CLINIC | Age: 60
End: 2025-05-28
Payer: COMMERCIAL

## 2025-05-28 VITALS — DIASTOLIC BLOOD PRESSURE: 85 MMHG | HEART RATE: 84 BPM | SYSTOLIC BLOOD PRESSURE: 133 MMHG

## 2025-05-28 DIAGNOSIS — I10 ESSENTIAL HYPERTENSION: Primary | ICD-10-CM

## 2025-05-28 PROCEDURE — 99207 PR NO CHARGE NURSE ONLY: CPT

## 2025-05-28 PROCEDURE — 3075F SYST BP GE 130 - 139MM HG: CPT

## 2025-05-28 PROCEDURE — 3079F DIAST BP 80-89 MM HG: CPT

## 2025-05-28 NOTE — PROGRESS NOTES
I met with Miquel Llanes at the request of Ana Maria Valentin to recheck his blood pressure.  Blood pressure medications on the med list were reviewed with patient.    Patient has taken all medications as per usual regimen: Yes  Patient reports tolerating them without any issues or concerns: Yes    Vitals:    05/28/25 0841   BP: 133/85   BP Location: Right arm   Patient Position: Sitting   Cuff Size: Adult Large   Pulse: 84       Blood pressure was taken, previous encounter was reviewed, recorded blood pressure below 140/90.  Patient was discharged and the note will be sent to the provider for final review.

## 2025-06-23 ENCOUNTER — MYC MEDICAL ADVICE (OUTPATIENT)
Dept: ENDOCRINOLOGY | Facility: CLINIC | Age: 60
End: 2025-06-23
Payer: COMMERCIAL

## 2025-06-23 DIAGNOSIS — E11.65 UNCONTROLLED TYPE 2 DIABETES MELLITUS WITH HYPERGLYCEMIA (H): ICD-10-CM

## 2025-06-23 DIAGNOSIS — Z79.4 TYPE 2 DIABETES MELLITUS WITH MICROALBUMINURIA, WITH LONG-TERM CURRENT USE OF INSULIN (H): ICD-10-CM

## 2025-06-23 DIAGNOSIS — R80.9 TYPE 2 DIABETES MELLITUS WITH MICROALBUMINURIA, WITH LONG-TERM CURRENT USE OF INSULIN (H): ICD-10-CM

## 2025-06-23 DIAGNOSIS — E11.29 TYPE 2 DIABETES MELLITUS WITH MICROALBUMINURIA, WITH LONG-TERM CURRENT USE OF INSULIN (H): ICD-10-CM

## 2025-06-25 NOTE — TELEPHONE ENCOUNTER
Called pt regarding unread mychart message. He states that he is doing well right now and does not wish to schedule a follow up at this time. He will call back or send a mychart message in the future he needs to follow up.   Ariana Malcolm RN on 6/25/2025 at 2:14 PM

## 2025-06-26 ASSESSMENT — SLEEP AND FATIGUE QUESTIONNAIRES
HOW LIKELY ARE YOU TO NOD OFF OR FALL ASLEEP WHILE SITTING AND READING: SLIGHT CHANCE OF DOZING
HOW LIKELY ARE YOU TO NOD OFF OR FALL ASLEEP WHILE SITTING INACTIVE IN A PUBLIC PLACE: SLIGHT CHANCE OF DOZING
HOW LIKELY ARE YOU TO NOD OFF OR FALL ASLEEP WHILE LYING DOWN TO REST IN THE AFTERNOON WHEN CIRCUMSTANCES PERMIT: SLIGHT CHANCE OF DOZING
HOW LIKELY ARE YOU TO NOD OFF OR FALL ASLEEP WHILE WATCHING TV: MODERATE CHANCE OF DOZING
HOW LIKELY ARE YOU TO NOD OFF OR FALL ASLEEP WHEN YOU ARE A PASSENGER IN A CAR FOR AN HOUR WITHOUT A BREAK: SLIGHT CHANCE OF DOZING
HOW LIKELY ARE YOU TO NOD OFF OR FALL ASLEEP WHILE SITTING QUIETLY AFTER LUNCH WITHOUT ALCOHOL: WOULD NEVER DOZE
HOW LIKELY ARE YOU TO NOD OFF OR FALL ASLEEP WHILE SITTING AND TALKING TO SOMEONE: WOULD NEVER DOZE
HOW LIKELY ARE YOU TO NOD OFF OR FALL ASLEEP IN A CAR, WHILE STOPPED FOR A FEW MINUTES IN TRAFFIC: WOULD NEVER DOZE

## 2025-06-29 ENCOUNTER — TRANSFERRED RECORDS (OUTPATIENT)
Dept: HEALTH INFORMATION MANAGEMENT | Facility: CLINIC | Age: 60
End: 2025-06-29

## 2025-06-29 LAB
ALBUMIN/CREATININE RATIO: 144 MG/G
GFR ESTIMATED (EXTERNAL): >=60 ML/MIN/1.73M2
HBA1C MFR BLD: 9.6 %

## 2025-07-01 ENCOUNTER — VIRTUAL VISIT (OUTPATIENT)
Dept: SLEEP MEDICINE | Facility: CLINIC | Age: 60
End: 2025-07-01
Attending: PHYSICIAN ASSISTANT
Payer: COMMERCIAL

## 2025-07-01 VITALS
DIASTOLIC BLOOD PRESSURE: 89 MMHG | HEIGHT: 70 IN | SYSTOLIC BLOOD PRESSURE: 152 MMHG | HEART RATE: 72 BPM | WEIGHT: 215 LBS | BODY MASS INDEX: 30.78 KG/M2

## 2025-07-01 DIAGNOSIS — H93.13 TINNITUS OF BOTH EARS: ICD-10-CM

## 2025-07-01 DIAGNOSIS — R06.83 SNORING: ICD-10-CM

## 2025-07-01 DIAGNOSIS — F51.04 PSYCHOPHYSIOLOGICAL INSOMNIA: ICD-10-CM

## 2025-07-01 DIAGNOSIS — G47.10 SLEEPING EXCESSIVE: Primary | ICD-10-CM

## 2025-07-01 PROCEDURE — 3077F SYST BP >= 140 MM HG: CPT | Mod: 95 | Performed by: INTERNAL MEDICINE

## 2025-07-01 PROCEDURE — 98002 SYNCH AUDIO-VIDEO NEW MOD 45: CPT | Performed by: INTERNAL MEDICINE

## 2025-07-01 PROCEDURE — 1125F AMNT PAIN NOTED PAIN PRSNT: CPT | Mod: 95 | Performed by: INTERNAL MEDICINE

## 2025-07-01 PROCEDURE — 3079F DIAST BP 80-89 MM HG: CPT | Mod: 95 | Performed by: INTERNAL MEDICINE

## 2025-07-01 ASSESSMENT — PAIN SCALES - GENERAL: PAINLEVEL_OUTOF10: MILD PAIN (2)

## 2025-07-01 NOTE — NURSING NOTE
Current patient location: [address] [place] [address]    Is the patient currently in the state of [place]? YES    Visit mode: VIDEO    If the visit is dropped, the patient can be reconnected by:VIDEO VISIT: Text to cell phone:   Telephone Information:   Mobile [phone]       Will anyone else be joining the visit? NO  (If patient encounters technical issues they should call [phone]    Are changes needed to the allergy or medication list? Pt stated no changes to allergies and Pt stated no med changes    Are refills needed on medications prescribed by this physician? NO    Rooming Documentation:  Questionnaire(s) completed    Reason for visit: Consult    Linda CURIEL

## 2025-07-01 NOTE — PATIENT INSTRUCTIONS
CBT-I Introductory Module    Understanding Sleep and Insomnia    Most people have trouble sleeping at some point in their life.   Chronic insomnia means you have had trouble falling asleep and/or staying asleep for at least the past three months.  Despite allowing enough time for sleep, it is affecting how you feel. You are not alone.  It is estimated that 10-15% of adults experience chronic insomnia.     Cognitive Behavioral Therapy for Insomnia (CBT-I)    Consistent with the guidelines of the American College of Physicians, [facility] recommends  Cognitive Behavioral Therapy for Insomnia (CBT-I) as the first-line treatment for insomnia.  CBT-I targets factors that lead to long-term insomnia:    Behavioral Conditioning    When you lay awake in bed over many nights, your body actually becomes trained or 'conditioned' to be awake during the night.  It makes the bed associated with alertness instead of sleepiness.    Habits that weaken your body's sleep drive and circadian sleep rhythm    Changing sleep schedules, extended time in bed, using mobile devices and computers close to bedtime, and naps too late in the day can harm your sleep at night.    Emotional and Physical Arousal    Things such as worrying about sleep, stress, drinking too much caffeine, and not winding down before bed can interfere with your body's natural sleep drive.    Understanding Sleep and Insomnia    [facility] CBT-I is a four-part program that teaches you the skills needed for a better night's sleep. The first step in your program is learning a bit about sleep and insomnia.      The Basics of Sleep    How does sleep help us?    Sleep, like food and water, is something we need every day. The purpose of sleep is still not exactly clear, but sleep experts agree we need consistent quality sleep to function at our best. There is evidence that sleep helps maintain brain and body functions.  It helps maintain thinking ability  and mood.  Sleep is actually a very active part of life. Sleep occurs in four stages that cycle every  minutes throughout the night. We get our deepest sleep during the first few hours of sleep.  During the last half of our sleep, we usually get the bulk of our REM (Rapid Eye Movement) sleep.  REM sleep is when most of our dreaming occurs.    How much sleep do we need?    Sleep needs vary from person to person. Most adults need between 6-8 hours of sleep.  Sleeping too little or too much may be a health risk.  As we age, most people report their sleep gets lighter, earlier, shorter, and more restless.     What Controls Our Sleep?    The three things that regulate your sleep are your sleep drive, biological clock and your arousal system (emotional and physical).  Together these make us feel alert during the day and promote sleep at night.    Your sleep drive depends on how long you have been awake. It is lowest when you first wake up.  Sleep drive gradually increases as the day goes on.  The longer time you are awake the easier it is to fall asleep.  Sleeping gradually reduces your sleep drive. That is why napping in the evening or close to bed can make it harder to sleep at night.    Your biological clock promotes wakefulness during the day and sleep at night.        Understanding Insomnia    What causes insomnia?    Some people have a greater predisposition to developing insomnia due to genetics, personality or age. A host of things can trigger or precipitate it: jet lag, working a different shift, medication, or the onset of a medical or mental health condition.             Insomnia and Your Arousal System    Mental activity, emotions and physical symptoms can make your brain too active to sleep by masking the strength of your sleep drive. Your brain's arousal system not only triggers insomnia, it plays a role in maintaining it as well.  Common sources of arousal include:    Worry about sleep in bed  An  active mind concerned about unfinished tasks  Anxiety, Stress and Depression  Pain     What perpetuates insomnia?    Short-term insomnia can become chronic when you begin to feel fearful, worried and  on guard  about sleep loss. As you spend more time in bed or try forcing yourself to sleep your bed becomes linked with wakefulness.  This is why people with insomnia commonly report feeling tired before bed but suddenly more alert when getting into bed.  This type of  conditioning  along with unhelpful sleep habits maintains the insomnia even when the triggering event has resolved.    Tracking your Sleep    Sleep tracking is an essential part of training yourself to sleep better and monitoring your progress.  There are several ways to keep track of your sleep habits.     Insomnia  Johanna    The Insomnia  johanna is a convenient way to keep track of your sleep prior to and during treatment.  Simply download the free johanna on your Apple or Android phone and record your information each morning.   The data you enter should be your recollection of the past nigh of sleep. Do not watch or monitor the clock in the middle of the night while keeping your sleep diary.     The johanna also includes training and sleep schedule recommendations.  We recommend you use only the tracking function unless instructed by your provider. You can email your data to yourself prior to your visit by using the [place] User Data function found in the Settings Section.  It is important that you have your data available to review with your provider at the time of your visit.             [employer] Sleep Diary    You can also track your sleep using the [place] paper sleep diary.  You can upload your sleep diary and send it via a [employer] message, fax it to [phone], or have it with you at the time of your visit.          Mobile and Wearable Sleep Tracking Devices    There are many sleep tracking devices and mobile applications that  estimate the timing and quantity of sleep by measuring body movement.  Though many of these devices claim to measure the depth or stages of sleep, they cannot measure brain wave activity or other indicators required to determine the actual stages of sleep.  They are helpful in estimating the timing and total amount of time you sleep.    CBT-I and Sleeping Pills    Sleep medications can be helpful in the short-term but often stop working in the long-term.  Sleeping pills treat symptoms and not the underlying cause of insomnia.  They also can have side effects that last well into the day. Abruptly stopping sleeping pills can cause temporary rebound insomnia and lead to increased distress about sleeplessness.  This in turn strengthens the belief that pills are necessary for sleep.    Many patients choose to discontinue sleeping pills prior to beginning CBT-I.  You should talk to your prescribing provider before tapering or discontinuing sleep medication.

## 2025-07-01 NOTE — PROGRESS NOTES
[facility] [address] [address]  Phone: [phone]    Patient:  Miquel Llanes, Date of birth 1965  Date of Visit:  07/01/2025  Referring Provider Ana Maria Valentin                  Virtual Visit Details    Type of service:  Video Visit     Originating Location (pt. Location): Home    Distant Location (provider location):  On-site  Platform used for Video Visit: [facility]         Assessment and Plan:   60 years old male with history of hypertension, type 2 diabetes, hypertriglyceridemia, chronic kidney disease stage I, class I obesity and longstanding history of fatigue has been referred by her primary his primary healthcare provider for evaluation of possible sleep disordered breathing.  He does report of snoring, interrupted nocturnal sleep, nocturia and reported excessive need for sleep in the form of a nap in recent past.  He also complains of difficulty falling asleep occasionally which historically has been associated with chronic tinnitus.  Sleep disordered breathing.  His history and clinical presentation is suggestive with moderately high pretest probability for obstructive sleep apnea.  In the absence of any known advanced cardiovascular or pulmonary comorbidities a type III home sleep apnea test is a reasonable screening tool.  Insomnia.  Historically reported poor sleep initiation and was triggered by chronic tinnitus perpetuated by maladaptive behaviors.  Is been using Viatorr blue noise in the background which has been helpful.  He has a poor sleep hygiene as he dozes off while watching television on his couch and will eventually retire to his bed around 11 PM with inconsistency as bed time.  He is also reporting of waking up earlier in the morning between 4 AM to 4:30 AM which is considered habitual throughout his adult life but could suggest poor sleep maintenance triggered by possible untreated sleep disordered  breathing.  Insufficient sleep.  On average she is getting between 5 to 6 hours of sleep including his nap.  The chronic sleep insufficiency is contributing towards his symptoms of daytime tiredness and need for a nap.  On the other hand napping during the daytime affects his sleep homeostasis and delays sleep onset later at night.      Comorbid Diagnoses:    Hypertension.  Chronic tinnitus.  Type 2 diabetes.  Hypertriglyceridemia.  Osteoarthritis and chronic pain of left knee    Summary Recommendations:    Evaluation by otolaryngologist for his chronic tinnitus which is acting as a possible trigger for his insomnia.  NOx T3 Home sleep apnea test to screen and determine the severity of obstructive sleep apnea.  Brief behavioral therapy for insomnia counseling was conducted with focus on identification of possible triggers, improving sleep hygiene, stimulus control.  He was also provided with educational material regarding cognitive behavioral therapy for insomnia.  He is advised to avoid naps and maintain a consistent bedtime around 10 PM.  He can use background White or blue noise which has been helpful in canceling his tinnitus and falling asleep.    Summary Counseling:  Check out [email or web address]           History of Present Illness:     Miquel Llanes is a 60 year old male with above-mentioned medical history was recently evaluated by his primary healthcare provider and concerned with his multiple comorbidities including history of obesity, type 2 diabetes and its complications, raise concern for possible sleep disordered breathing.  He has been told of his snoring and has struggled with maintaining sleep for many years.  He usually doze off between 9:30 PM to 10:30 PM while watching television on his couch.  He usually will retire around 11 PM to his bed to continue his sleep.  If he attempts to sleep in his bed earlier he would lay in his bed for at least an hour usually affected by his chronic  tinnitus.  For years he has been using the television as a background noise or recorded noise of rain or waterfall which helps him fall asleep.  During the night he used to wake up between 3-4 times to use the bathroom, but recently, since he started using Mounjaro the frequency has decreased.  There is also used Ozempic for over 6 months and successfully has lost 15 pounds in weight.  He wakes up between 4 AM to 5 AM which he attributes to his longstanding habit of waking up early in the morning.  He is unsure if he wakes up rested in the morning.  He denies symptoms of hypnagogic, hypnopompic hallucinations, sleep paralysis or cataplexy.      Sleep wake schedule:   Workday &  Non workday he usually doze off watching television on his couch usually between 9:30 PM - 11:00 PM, will go to bed 11:00 PM  Awakening 2-3 to use the bathroom, lately once nightly. He wakes up in morning 4:30 AM without using alarm.  Naps daily in the afternoon usually for an hour.     He is unsure if he feels rested in the morning.    [place] Sleepiness Scale: 6/24           Medications:     Current Outpatient Medications   Medication Sig Dispense Refill    budesonide (PULMICORT) 0.5 MG/2ML neb solution Place 0.5 mg/2 mL budesonide vial in 8 oz normal saline sinus rinse bottle.  Irrigate each nostril with one half of the bottle twice daily. 360 mL 3    COMPOUNDED NON-CONTROLLED SUBSTANCE (CMPD RX) - PHARMACY TO MIX COMPOUNDED MEDICATION Budesonide 0.6 mg.  Place medication in 8 oz of normal saline. Rinse each side of the nose with 4 oz of solution twice daily. 180 capsule 3    insulin glargine (LANTUS PEN) 100 UNIT/ML pen Inject 50 Units subcutaneously at bedtime. 60 mL 1    insulin pen needle (31G X 8 MM) 31G X 8 MM miscellaneous Use 1 pen needles daily or as directed. 100 each 1    JARDIANCE 25 MG TABS tablet Take 1 tablet (25 mg) by mouth daily. 90 tablet 2    lisinopril (ZESTRIL) 40 MG tablet Take 1 tablet (40 mg) by mouth daily. - dose  increase 3/18/25 90 tablet 3    sildenafil (REVATIO) 20 MG tablet Take 2-5 tablets ( mg) by mouth daily as needed (take 30 mins to 4 hours before sexual activity). 30 tablet 2    tirzepatide (MOUNJARO) 5 MG/0.5ML SOAJ auto-injector pen Inject 0.5 mLs (5 mg) subcutaneously once a week. 2 mL 0    Semaglutide, 2 MG/DOSE, (OZEMPIC) 8 MG/3ML pen Inject 2 mg subcutaneously every 7 days. - dose increase 11/4/24. LAST REFILL (Patient not taking: Reported on 7/1/2025) 9 mL 0     No current facility-administered medications for this visit.        Allergies   Allergen Reactions    Rosuvastatin Muscle Pain (Myalgia)    Atorvastatin Rash            Past Medical History:     Does not need 02 supplement at night   Past Medical History:   Diagnosis Date    Arthritis     Background diabetic retinopathy, mild, ou 10/15/2022    Diabetes (H)     Hypertension              Past Surgical History:    No h/o  upper airway surgery  Past Surgical History:   Procedure Laterality Date    ORTHOPEDIC SURGERY            Physical Examination:     Vitals:  No vitals were obtained today due to virtual visit.    Physical Exam   EYES: Eyes grossly normal to inspection.  No discharge or erythema, or obvious scleral/conjunctival abnormalities.  SKIN: Visible skin clear. No significant rash, abnormal pigmentation or lesions.  NEURO: Cranial nerves grossly intact.  Mentation and speech appropriate for age.  GENERAL: Healthy, alert and no distress  RESP: No audible wheeze, cough, or visible cyanosis.  No visible retractions or increased work of breathing.    PSYCH: Mentation appears normal, affect normal/bright, judgement and insight intact, normal speech and appearance well-groomed.      Copy to: Ana Maria Valentin    Total of 56 minutes of time was spent with patient, this included the interview and exam, and review of the chart/labs/imaging/sleep study/PAP therapy data on 07/01/2025. Greater than 50% of which was spent counseling and coordinating  care.   Agueda Johnson MD 7/1/2025     [facility]   Floor 1, [address]   [address] [place] [address]   Appointments: [phone]    [facility]  [address]rd Floor  [address] , [place] [address]    Do not delete. Used for tracking note template use:[id number]}

## 2025-07-02 ENCOUNTER — PATIENT OUTREACH (OUTPATIENT)
Dept: CARE COORDINATION | Facility: CLINIC | Age: 60
End: 2025-07-02
Payer: COMMERCIAL

## 2025-07-05 ENCOUNTER — HEALTH MAINTENANCE LETTER (OUTPATIENT)
Age: 60
End: 2025-07-05

## 2025-07-05 ENCOUNTER — PATIENT OUTREACH (OUTPATIENT)
Dept: CARE COORDINATION | Facility: CLINIC | Age: 60
End: 2025-07-05
Payer: COMMERCIAL

## 2025-07-16 DIAGNOSIS — R80.9 TYPE 2 DIABETES MELLITUS WITH MICROALBUMINURIA, WITH LONG-TERM CURRENT USE OF INSULIN (H): ICD-10-CM

## 2025-07-16 DIAGNOSIS — E11.29 TYPE 2 DIABETES MELLITUS WITH MICROALBUMINURIA, WITH LONG-TERM CURRENT USE OF INSULIN (H): ICD-10-CM

## 2025-07-16 DIAGNOSIS — Z79.4 TYPE 2 DIABETES MELLITUS WITH MICROALBUMINURIA, WITH LONG-TERM CURRENT USE OF INSULIN (H): ICD-10-CM

## 2025-07-16 RX ORDER — EMPAGLIFLOZIN 25 MG/1
25 TABLET, FILM COATED ORAL DAILY
Qty: 90 TABLET | Refills: 0 | OUTPATIENT
Start: 2025-07-16

## 2025-08-22 ENCOUNTER — TELEPHONE (OUTPATIENT)
Dept: OTOLARYNGOLOGY | Facility: CLINIC | Age: 60
End: 2025-08-22
Payer: COMMERCIAL

## 2025-08-25 ENCOUNTER — TRANSFERRED RECORDS (OUTPATIENT)
Dept: HEALTH INFORMATION MANAGEMENT | Facility: CLINIC | Age: 60
End: 2025-08-25
Payer: COMMERCIAL